# Patient Record
Sex: FEMALE | Race: WHITE | Employment: FULL TIME | URBAN - METROPOLITAN AREA
[De-identification: names, ages, dates, MRNs, and addresses within clinical notes are randomized per-mention and may not be internally consistent; named-entity substitution may affect disease eponyms.]

---

## 2017-10-04 ENCOUNTER — OFFICE VISIT (OUTPATIENT)
Dept: INTERNAL MEDICINE CLINIC | Age: 37
End: 2017-10-04

## 2017-10-04 VITALS
DIASTOLIC BLOOD PRESSURE: 65 MMHG | HEIGHT: 66 IN | SYSTOLIC BLOOD PRESSURE: 104 MMHG | OXYGEN SATURATION: 99 % | HEART RATE: 57 BPM | WEIGHT: 142.6 LBS | BODY MASS INDEX: 22.92 KG/M2 | TEMPERATURE: 98.1 F | RESPIRATION RATE: 12 BRPM

## 2017-10-04 DIAGNOSIS — H43.393 VITREOUS FLOATERS OF BOTH EYES: ICD-10-CM

## 2017-10-04 DIAGNOSIS — Z80.0 FAMILY HISTORY OF COLON CANCER: ICD-10-CM

## 2017-10-04 DIAGNOSIS — Z23 ENCOUNTER FOR IMMUNIZATION: ICD-10-CM

## 2017-10-04 DIAGNOSIS — R55 NEUROCARDIOGENIC SYNCOPE: ICD-10-CM

## 2017-10-04 DIAGNOSIS — R14.0 ABDOMINAL BLOATING: ICD-10-CM

## 2017-10-04 DIAGNOSIS — Z00.00 WELL WOMAN EXAM (NO GYNECOLOGICAL EXAM): Primary | ICD-10-CM

## 2017-10-04 DIAGNOSIS — G44.309 POST-CONCUSSION HEADACHE: ICD-10-CM

## 2017-10-04 NOTE — MR AVS SNAPSHOT
Visit Information Date & Time Provider Department Dept. Phone Encounter #  
 10/4/2017  9:00 AM Yahir Samson NP [facility] [address] [id number] Upcoming Health Maintenance Date Due  
 PAP AKA CERVICAL CYTOLOGY 5/8/2003 INFLUENZA AGE 9 TO ADULT 8/1/2017 DTaP/Tdap/Td series (2 - Td) 8/18/2020 Allergies as of 10/4/2017  Review Complete On: 10/4/2017 By: Yahir Samson NP No Known Allergies Current Immunizations  Reviewed on 10/4/2017 Name Date Influenza Vaccine (Quad) PF  Incomplete, 11/5/2015 MMR 8/18/2010 TB Skin Test (PPD) Intradermal 11/10/2015 Tdap 8/18/2010 Reviewed by Yahir Samson NP on 10/4/2017 at  9:13 AM  
You Were Diagnosed With   
  
 Codes Comments Well woman exam (no gynecological exam)    -  Primary ICD-10-CM: Z00.00 ICD-9-CM: V70.0 Post-concussion headache     ICD-10-CM: J58.524 ICD-9-CM: 339.20 Neurocardiogenic syncope     ICD-10-CM: R55 
ICD-9-CM: 780. 2 Abdominal bloating     ICD-10-CM: R14.0 ICD-9-CM: 887. 3 Family history of colon cancer     ICD-10-CM: Z80.0 ICD-9-CM: V16.0 Encounter for immunization     ICD-10-CM: A67 ICD-9-CM: V03.89 Vitals BP Pulse Temp Resp Height(growth percentile) Weight(growth percentile) 104/65 (BP 1 Location: Left arm, BP Patient Position: Sitting) (!) 57 98.1 °F (36.7 °C) (Oral) 12 5' 6\" (1.676 m) 142 lb 9.6 oz (64.7 kg) LMP SpO2 BMI OB Status Smoking Status 09/14/2017 99% 23.02 kg/m2 Having regular periods Never Smoker BMI and BSA Data Body Mass Index Body Surface Area 23.02 kg/m 2 1.74 m 2 Preferred Pharmacy [facility] [address] [phone] Your Updated Medication List  
  
   
This list is accurate as of: 10/4/17  9:39 AM.  Always use your most recent med list.  
  
  
  
  
 PRENATAL VITAMIN PO  
 Take  by mouth. We Performed the Following CBC WITH AUTOMATED DIFF [08880 CPT(R)] INFLUENZA VIRUS VAC QUAD,SPLIT,PRESV FREE SYRINGE IM [id number] CPT(R)] LIPID PANEL [62397 CPT(R)] METABOLIC PANEL, COMPREHENSIVE [88618 CPT(R)] GA IMMUNIZ ADMIN,1 SINGLE/COMB VAC/TOXOID [id number] CPT(R)] REFERRAL TO GASTROENTEROLOGY [KYH36 Custom] TSH 3RD GENERATION [38439 CPT(R)] Referral Information Referral ID Referred By Referred To  
  
 [id number] [address] Phone: [phone] Fax: [phone] Visits Status Start Date End Date 1 New Request 10/4/17 10/4/18 If your referral has a status of pending review or denied, additional information will be sent to support the outcome of this decision. Patient Instructions Well Visit, Ages 25 to 48: Care Instructions Your Care Instructions Physical exams can help you stay healthy. Your doctor has checked your overall health and may have suggested ways to take good care of yourself. He or she also may have recommended tests. At home, you can help prevent illness with healthy eating, regular exercise, and other steps. Follow-up care is a key part of your treatment and safety. Be sure to make and go to all appointments, and call your doctor if you are having problems. It's also a good idea to know your test results and keep a list of the medicines you take. How can you care for yourself at home? · Reach and stay at a healthy weight. This will lower your risk for many problems, such as obesity, diabetes, heart disease, and high blood pressure. · Get at least 30 minutes of physical activity on most days of the week. Walking is a good choice. You also may want to do other activities, such as running, swimming, cycling, or playing tennis or team sports. Discuss any changes in your exercise program with your doctor. · Do not smoke or allow others to smoke around you. If you need help quitting, talk to your doctor about stop-smoking programs and medicines. These can increase your chances of quitting for good. · Talk to your doctor about whether you have any risk factors for sexually transmitted infections (STIs). Having one sex partner (who does not have STIs and does not have sex with anyone else) is a good way to avoid these infections. · Use birth control if you do not want to have children at this time. Talk with your doctor about the choices available and what might be best for you. · Protect your skin from too much sun. When you're outdoors from 10 a.m. to 4 p.m., stay in the shade or cover up with clothing and a hat with a wide brim. Wear sunglasses that block UV rays. Even when it's cloudy, put broad-spectrum sunscreen (SPF 30 or higher) on any exposed skin. · See a dentist one or two times a year for checkups and to have your teeth cleaned. · Wear a seat belt in the car. · Drink alcohol in moderation, if at all. That means no more than 2 drinks a day for men and 1 drink a day for women. Follow your doctor's advice about when to have certain tests. These tests can spot problems early. For everyone · Cholesterol. Have the fat (cholesterol) in your blood tested after age 21. Your doctor will tell you how often to have this done based on your age, family history, or other things that can increase your risk for heart disease. · Blood pressure. Have your blood pressure checked during a routine doctor visit. Your doctor will tell you how often to check your blood pressure based on your age, your blood pressure results, and other factors. · Vision. Talk with your doctor about how often to have a glaucoma test. 
· Diabetes. Ask your doctor whether you should have tests for diabetes. · Colon cancer. Have a test for colon cancer at age 48.  You may have one of several tests. If you are younger than 48, you may need a test earlier if you have any risk factors. Risk factors include whether you already had a precancerous polyp removed from your colon or whether your parent, brother, sister, or child has had colon cancer. For women · Breast exam and mammogram. Talk to your doctor about when you should have a clinical breast exam and a mammogram. Medical experts differ on whether and how often women under 50 should have these tests. Your doctor can help you decide what is right for you. · Pap test and pelvic exam. Begin Pap tests at age 24. A Pap test is the best way to find cervical cancer. The test often is part of a pelvic exam. Ask how often to have this test. 
· Tests for sexually transmitted infections (STIs). Ask whether you should have tests for STIs. You may be at risk if you have sex with more than one person, especially if your partners do not wear condoms. For men · Tests for sexually transmitted infections (STIs). Ask whether you should have tests for STIs. You may be at risk if you have sex with more than one person, especially if you do not wear a condom. · Testicular cancer exam. Ask your doctor whether you should check your testicles regularly. · Prostate exam. Talk to your doctor about whether you should have a blood test (called a PSA test) for prostate cancer. Experts differ on whether and when men should have this test. Some experts suggest it if you are older than 39 and are -American or have a father or brother who got prostate cancer when he was younger than 72. When should you call for help? Watch closely for changes in your health, and be sure to contact your doctor if you have any problems or symptoms that concern you. Where can you learn more? Go to http://ghassan-martina.info/. Enter P072 in the search box to learn more about \"Well Visit, Ages 25 to 48: Care Instructions. \" Current as of: July 19, 2016 Content Version: 11.3 © 7401-5935 [employer]cherlink. Care instructions adapted under license by [employer] (which disclaims liability or warranty for this information). If you have questions about a medical condition or this instruction, always ask your healthcare professional. [address] any warranty or liability for your use of this information. Irritable Bowel Syndrome: Care Instructions Your Care Instructions Irritable bowel syndrome, or IBS, is a problem with the intestines that causes belly pain, bloating, gas, constipation, and diarrhea. The cause of IBS is not well known. IBS can last for many years, but it does not get worse over time or lead to serious disease. Most people can control their symptoms by changing their diet and reducing stress. Follow-up care is a key part of your treatment and safety. Be sure to make and go to all appointments, and call your doctor if you are having problems. It's also a good idea to know your test results and keep a list of the medicines you take. How can you care for yourself at home? · For constipation: 
¨ Include fruits, vegetables, beans, and whole grains in your diet each day. These foods are high in fiber. ¨ Drink plenty of fluids, enough so that your urine is light yellow or clear like water. If you have kidney, heart, or liver disease and have to limit fluids, talk with your doctor before you increase the amount of fluids you drink. ¨ Get some exercise every day. Build up slowly to 30 to 60 minutes a day on 5 or more days of the week. ¨ Take a fiber supplement, such as Citrucel or Metamucil, every day if needed. Read and follow all instructions on the label. ¨ Schedule time each day for a bowel movement. Having a daily routine may help. Take your time and do not strain when having a bowel movement. · If you often have diarrhea, limit foods and drinks that make it worse. These are different for each person but may include caffeine (found in coffee, tea, chocolate, and cola drinks), alcohol, fatty foods, gas-producing foods (such as beans, cabbage, and broccoli), some dairy products, and spicy foods. Do not eat candy or gum that contains sorbitol. · Keep a daily diary of what you eat and what symptoms you have. This may help find foods that cause you problems. · Eat slowly. Try to make mealtime relaxing. · Find ways to reduce stress. · Get at least 30 minutes of exercise on most days of the week. Exercise can help reduce tension and prevent constipation. Walking is a good choice. You also may want to do other activities, such as running, swimming, cycling, or playing tennis or team sports. When should you call for help? Call your doctor now or seek immediate medical care if: 
· Your pain is different than usual or occurs with fever. · You lose weight without trying, or you lose your appetite and you do not know why. · Your symptoms often wake you from sleep. · Your stools are black and tarlike or have streaks of blood. Watch closely for changes in your health, and be sure to contact your doctor if: 
· Your IBS symptoms get worse or begin to disrupt your day-to-day life. · You become more tired than usual. 
· Your home treatment stops working. Where can you learn more? Go to http://ghassan-martina.info/. Enter S340 in the search box to learn more about \"Irritable Bowel Syndrome: Care Instructions. \" Current as of: August 9, 2016 Content Version: 11.3 © 2531-1457 Bannerman. Care instructions adapted under license by [employer] (which disclaims liability or warranty for this information). If you have questions about a medical condition or this instruction, always ask your healthcare professional. [place] [address] any warranty or liability for your use of this information. Influenza (Flu) Vaccine: Care Instructions Your Care Instructions Influenza (flu) is an infection in the lungs and breathing passages. It is caused by the influenza virus. There are different strains, or types, of the flu virus every year. The flu comes on quickly. It can cause a cough, stuffy nose, fever, chills, tiredness, and aches and pains. These symptoms may last up to 10 days. The flu can make you feel very sick, but most of the time it doesn't lead to other problems. But it can cause serious problems in people who are older or who have a long-term illness, such as heart disease or diabetes. You can help prevent the flu by getting a flu vaccine every year, as soon as it is available. You cannot get the flu from the vaccine. The vaccine prevents most cases of the flu. But even when the vaccine doesn't prevent the flu, it can make symptoms less severe and reduce the chance of problems from the flu. Sometimes, young children and people who have an immune system problem may have a slight fever or muscle aches or pains 6 to 12 hours after getting the shot. These symptoms usually last 1 or 2 days. Follow-up care is a key part of your treatment and safety. Be sure to make and go to all appointments, and call your doctor if you are having problems. It's also a good idea to know your test results and keep a list of the medicines you take. Who should get the flu vaccine? Everyone age 7 months or older should get a flu vaccine each year. It lowers the chance of getting and spreading the flu. The vaccine is very important for people who are at high risk for getting other health problems from the flu. This includes: · Anyone 48years of age or older. · People who live in a long-term care center, such as a nursing home. · All children 6 months through 25years of age. · Adults and children 6 months and older who have long-term heart or lung problems, such as asthma. · Adults and children 6 months and older who needed medical care or were in a hospital during the past year because of diabetes, chronic kidney disease, or a weak immune system (including HIV or AIDS). · Women who will be pregnant during the flu season. · People who have any condition that can make it hard to breathe or swallow (such as a brain injury or muscle disorders). · People who can give the flu to others who are at high risk for problems from the flu. This includes all health care workers and close contacts of people age 72 or older. Who should not get the flu vaccine? The person who gives the vaccine may tell you not to get it if you: 
· Have a severe allergy to eggs or any part of the vaccine. · Have had a severe reaction to a flu vaccine in the past. 
· Have had Guillain-Barré syndrome (GBS). · Are sick with a fever. (Get the vaccine when symptoms are gone.) How can you care for yourself at home? · If you or your child has a sore arm or a slight fever after the shot, take an over-the-counter pain medicine, such as acetaminophen (Tylenol) or ibuprofen (Advil, Motrin). Read and follow all instructions on the label. Do not give aspirin to anyone younger than 20. It has been linked to Reye syndrome, a serious illness. · Do not take two or more pain medicines at the same time unless the doctor told you to. Many pain medicines have acetaminophen, which is Tylenol. Too much acetaminophen (Tylenol) can be harmful. When should you call for help? Call 911 anytime you think you may need emergency care. For example, call if after getting the flu vaccine: 
· You have symptoms of a severe reaction to the flu vaccine. Symptoms of a severe reaction may include: ¨ Severe difficulty breathing. ¨ Sudden raised, red areas (hives) all over your body. ¨ Severe lightheadedness. Call your doctor now or seek immediate medical care if after getting the flu vaccine: · You think you are having a reaction to the flu vaccine, such as a new fever. Watch closely for changes in your health, and be sure to contact your doctor if you have any problems. Where can you learn more? Go to http://ghassan-martina.info/. Enter P010 in the search box to learn more about \"Influenza (Flu) Vaccine: Care Instructions. \" Current as of: August 1, 2016 Content Version: 11.3 © 1757-9711 Libboo. Care instructions adapted under license by [employer] (which disclaims liability or warranty for this information). If you have questions about a medical condition or this instruction, always ask your healthcare professional. [place] [address] any warranty or liability for your use of this information. Introducing [facility]! Dear Ant Ac: Thank you for requesting a [employer] account. Our records indicate that you already have an active [employer] account. You can access your account anytime at [email or web address] Did you know that you can access your hospital and ER discharge instructions at any time in [employer]? You can also review all of your test results from your hospital stay or ER visit. Additional Information If you have questions, please visit the Frequently Asked Questions section of the [employer] website at [email or web address] Remember, [employer] is NOT to be used for urgent needs. For medical emergencies, dial 911. Now available from your iPhone and Android! Please provide this summary of care documentation to your next provider. Your primary care clinician is listed as [address]. If you have any questions after today's visit, please call [phone].

## 2017-10-04 NOTE — PROGRESS NOTES
Subjective:   30 y.o. female for Well Woman Check. Her gyne and breast care is done elsewhere by her Ob-Gyne physician. Had recent normal pap smear and mammogram with Dr Milly Aguilar in 2017. She has concerns about need for screening colonoscopy; both of her parents have had precancerous polyps and her maternal and paternal grandmothers had colon cancer. Her older sister started screening at age 28. She has noted some abdominal bloating, flatus with certain foods especially after eating pizza. Has noted persistent soft stools for years but denies blood in stools. History of Neurocardiogenic syncope that was diagnosed at age 15; tends to develop symptoms if she becomes dehydrated or has not eaten on a regular basis. Has been stable. Reports occasional left sided headaches since she was struck by baseball in 8/2016. Had negative Head CT scan but had concussion after injury. Has noted headache will develop if she is in a loud environment and she has been using earplugs when she feels noise level becomes overwhelming. Has noted some increased visual floaters since her concussion but has not had vision check recently. Current Outpatient Prescriptions   Medication Sig Dispense Refill    PRENATAL VIT CALC,IRON,FOLIC (PRENATAL VITAMIN PO) Take  by mouth.        No Known Allergies  Past Medical History:   Diagnosis Date    Neurocardiogenic syncope      Past Surgical History:   Procedure Laterality Date    HX ABDOMINAL LAPAROSCOPY      HX HEENT      wisdom teeth extraction    HX LEEP PROCEDURE      HX OTHER SURGICAL  2009    D&E     Family History   Problem Relation Age of Onset    Hypertension Mother    Lee Yan Elevated Lipids Mother     Colon Polyps Mother     Hypertension Father    Lee Yan Elevated Lipids Father     Colon Polyps Father     No Known Problems Sister     Bleeding Prob Brother      ITP    Cancer Maternal Grandmother      colon cancer    Cancer Paternal Grandmother      colon cancer    Heart Disease Paternal Grandfather     Hypertension Paternal Grandfather     No Known Problems Sister     No Known Problems Brother     No Known Problems Daughter      Social History   Substance Use Topics    Smoking status: Never Smoker    Smokeless tobacco: Never Used    Alcohol use 1.2 oz/week     2 Glasses of wine per week      Comment: weekly             ROS: Feeling generally well. No TIA's or unusual headaches, no dysphagia. No prolonged cough. No dyspnea or chest pain on exertion. No abdominal pain, change in bowel habits, black or bloody stools. No urinary tract symptoms. No new or unusual musculoskeletal symptoms. Specific concerns today: screening colonoscopy. Objective: The patient appears well, alert, oriented x 3, in no distress. Visit Vitals    /65 (BP 1 Location: Left arm, BP Patient Position: Sitting)    Pulse (!) 57    Temp 98.1 °F (36.7 °C) (Oral)    Resp 12    Ht 5' 6\" (1.676 m)    Wt 142 lb 9.6 oz (64.7 kg)    LMP 09/14/2017    SpO2 99%    BMI 23.02 kg/m2     ENT normal.  Neck supple. No adenopathy or thyromegaly. EDER. Lungs are clear, good air entry, no wheezes, rhonchi or rales. S1 and S2 normal, no murmurs, regular rate and rhythm. Abdomen soft without tenderness, guarding, mass or organomegaly. Extremities show no edema, normal peripheral pulses. Neurological is normal, no focal findings. Breast and Pelvic exams are deferred. Assessment/Plan:   Well Woman  routine labs ordered  Diagnoses and all orders for this visit:    1. Well woman exam (no gynecological exam)  -     CBC WITH AUTOMATED DIFF  -     LIPID PANEL  -     TSH 3RD GENERATION  -     METABOLIC PANEL, COMPREHENSIVE    2. Abdominal bloating -- suspect she may have some food sensitivity versus IBS; also need for screening colonoscopy due to family history of colon cancer and precancerous colon polyps  -     REFERRAL TO GASTROENTEROLOGY    3. Neurocardiogenic syncope --stable    4.  Post-concussion headache -- neurology consult if headaches worsen in severity    5. Vitreous floaters of both eyes  -     REFERRAL TO OPTOMETRY    6. Family history of colon cancer  -     REFERRAL TO GASTROENTEROLOGY    7. Encounter for immunization  -     Influenza virus vaccine (QUADRIVALENT PRES FREE SYRINGE) IM (83036)  -     Mily De La Torre ADMIN,1 SINGLE/COMB VAC/TOXOID    Rulon Snowball was counseled on age-appropriate/ guideline-based risk prevention behaviors and screening for a 30y.o. year old   female . We also discussed adjustments in screening based on family history if necessary. Printed instructions for preventative screening guidelines and healthy behaviors given to patient with after visit summary.         lab results and schedule of future lab studies reviewed with patient  reviewed diet, exercise and weight control  cardiovascular risk and specific lipid/LDL goals reviewed  reviewed medications and side effects in detail

## 2017-10-04 NOTE — PATIENT INSTRUCTIONS
Well Visit, Ages 25 to 48: Care Instructions  Your Care Instructions  Physical exams can help you stay healthy. Your doctor has checked your overall health and may have suggested ways to take good care of yourself. He or she also may have recommended tests. At home, you can help prevent illness with healthy eating, regular exercise, and other steps. Follow-up care is a key part of your treatment and safety. Be sure to make and go to all appointments, and call your doctor if you are having problems. It's also a good idea to know your test results and keep a list of the medicines you take. How can you care for yourself at home? · Reach and stay at a healthy weight. This will lower your risk for many problems, such as obesity, diabetes, heart disease, and high blood pressure. · Get at least 30 minutes of physical activity on most days of the week. Walking is a good choice. You also may want to do other activities, such as running, swimming, cycling, or playing tennis or team sports. Discuss any changes in your exercise program with your doctor. · Do not smoke or allow others to smoke around you. If you need help quitting, talk to your doctor about stop-smoking programs and medicines. These can increase your chances of quitting for good. · Talk to your doctor about whether you have any risk factors for sexually transmitted infections (STIs). Having one sex partner (who does not have STIs and does not have sex with anyone else) is a good way to avoid these infections. · Use birth control if you do not want to have children at this time. Talk with your doctor about the choices available and what might be best for you. · Protect your skin from too much sun. When you're outdoors from 10 a.m. to 4 p.m., stay in the shade or cover up with clothing and a hat with a wide brim. Wear sunglasses that block UV rays. Even when it's cloudy, put broad-spectrum sunscreen (SPF 30 or higher) on any exposed skin.   · See a dentist one or two times a year for checkups and to have your teeth cleaned. · Wear a seat belt in the car. · Drink alcohol in moderation, if at all. That means no more than 2 drinks a day for men and 1 drink a day for women. Follow your doctor's advice about when to have certain tests. These tests can spot problems early. For everyone  · Cholesterol. Have the fat (cholesterol) in your blood tested after age 21. Your doctor will tell you how often to have this done based on your age, family history, or other things that can increase your risk for heart disease. · Blood pressure. Have your blood pressure checked during a routine doctor visit. Your doctor will tell you how often to check your blood pressure based on your age, your blood pressure results, and other factors. · Vision. Talk with your doctor about how often to have a glaucoma test.  · Diabetes. Ask your doctor whether you should have tests for diabetes. · Colon cancer. Have a test for colon cancer at age 48. You may have one of several tests. If you are younger than 48, you may need a test earlier if you have any risk factors. Risk factors include whether you already had a precancerous polyp removed from your colon or whether your parent, brother, sister, or child has had colon cancer. For women  · Breast exam and mammogram. Talk to your doctor about when you should have a clinical breast exam and a mammogram. Medical experts differ on whether and how often women under 50 should have these tests. Your doctor can help you decide what is right for you. · Pap test and pelvic exam. Begin Pap tests at age 24. A Pap test is the best way to find cervical cancer. The test often is part of a pelvic exam. Ask how often to have this test.  · Tests for sexually transmitted infections (STIs). Ask whether you should have tests for STIs. You may be at risk if you have sex with more than one person, especially if your partners do not wear condoms.   For men  · Tests for sexually transmitted infections (STIs). Ask whether you should have tests for STIs. You may be at risk if you have sex with more than one person, especially if you do not wear a condom. · Testicular cancer exam. Ask your doctor whether you should check your testicles regularly. · Prostate exam. Talk to your doctor about whether you should have a blood test (called a PSA test) for prostate cancer. Experts differ on whether and when men should have this test. Some experts suggest it if you are older than 39 and are -American or have a father or brother who got prostate cancer when he was younger than 72. When should you call for help? Watch closely for changes in your health, and be sure to contact your doctor if you have any problems or symptoms that concern you. Where can you learn more? Go to http://ghassan-martina.info/. Enter P072 in the search box to learn more about \"Well Visit, Ages 25 to 48: Care Instructions. \"  Current as of: July 19, 2016  Content Version: 11.3  © 2397-7229 [employer]. Care instructions adapted under license by [employer] (which disclaims liability or warranty for this information). If you have questions about a medical condition or this instruction, always ask your healthcare professional. [place] [address] any warranty or liability for your use of this information. Irritable Bowel Syndrome: Care Instructions  Your Care Instructions  Irritable bowel syndrome, or IBS, is a problem with the intestines that causes belly pain, bloating, gas, constipation, and diarrhea. The cause of IBS is not well known. IBS can last for many years, but it does not get worse over time or lead to serious disease. Most people can control their symptoms by changing their diet and reducing stress. Follow-up care is a key part of your treatment and safety.  Be sure to make and go to all appointments, and call your doctor if you are having problems. It's also a good idea to know your test results and keep a list of the medicines you take. How can you care for yourself at home? · For constipation:  ¨ Include fruits, vegetables, beans, and whole grains in your diet each day. These foods are high in fiber. ¨ Drink plenty of fluids, enough so that your urine is light yellow or clear like water. If you have kidney, heart, or liver disease and have to limit fluids, talk with your doctor before you increase the amount of fluids you drink. ¨ Get some exercise every day. Build up slowly to 30 to 60 minutes a day on 5 or more days of the week. ¨ Take a fiber supplement, such as Citrucel or Metamucil, every day if needed. Read and follow all instructions on the label. ¨ Schedule time each day for a bowel movement. Having a daily routine may help. Take your time and do not strain when having a bowel movement. · If you often have diarrhea, limit foods and drinks that make it worse. These are different for each person but may include caffeine (found in coffee, tea, chocolate, and cola drinks), alcohol, fatty foods, gas-producing foods (such as beans, cabbage, and broccoli), some dairy products, and spicy foods. Do not eat candy or gum that contains sorbitol. · Keep a daily diary of what you eat and what symptoms you have. This may help find foods that cause you problems. · Eat slowly. Try to make mealtime relaxing. · Find ways to reduce stress. · Get at least 30 minutes of exercise on most days of the week. Exercise can help reduce tension and prevent constipation. Walking is a good choice. You also may want to do other activities, such as running, swimming, cycling, or playing tennis or team sports. When should you call for help? Call your doctor now or seek immediate medical care if:  · Your pain is different than usual or occurs with fever.   · You lose weight without trying, or you lose your appetite and you do not know why.  · Your symptoms often wake you from sleep. · Your stools are black and tarlike or have streaks of blood. Watch closely for changes in your health, and be sure to contact your doctor if:  · Your IBS symptoms get worse or begin to disrupt your day-to-day life. · You become more tired than usual.  · Your home treatment stops working. Where can you learn more? Go to http://ghassan-martina.info/. Enter A858 in the search box to learn more about \"Irritable Bowel Syndrome: Care Instructions. \"  Current as of: August 9, 2016  Content Version: 11.3  © 2410-3199 [employer]. Care instructions adapted under license by [employer] (which disclaims liability or warranty for this information). If you have questions about a medical condition or this instruction, always ask your healthcare professional. [place] [address] any warranty or liability for your use of this information. Influenza (Flu) Vaccine: Care Instructions  Your Care Instructions  Influenza (flu) is an infection in the lungs and breathing passages. It is caused by the influenza virus. There are different strains, or types, of the flu virus every year. The flu comes on quickly. It can cause a cough, stuffy nose, fever, chills, tiredness, and aches and pains. These symptoms may last up to 10 days. The flu can make you feel very sick, but most of the time it doesn't lead to other problems. But it can cause serious problems in people who are older or who have a long-term illness, such as heart disease or diabetes. You can help prevent the flu by getting a flu vaccine every year, as soon as it is available. You cannot get the flu from the vaccine. The vaccine prevents most cases of the flu. But even when the vaccine doesn't prevent the flu, it can make symptoms less severe and reduce the chance of problems from the flu.   Sometimes, young children and people who have an immune system problem may have a slight fever or muscle aches or pains 6 to 12 hours after getting the shot. These symptoms usually last 1 or 2 days. Follow-up care is a key part of your treatment and safety. Be sure to make and go to all appointments, and call your doctor if you are having problems. It's also a good idea to know your test results and keep a list of the medicines you take. Who should get the flu vaccine? Everyone age 7 months or older should get a flu vaccine each year. It lowers the chance of getting and spreading the flu. The vaccine is very important for people who are at high risk for getting other health problems from the flu. This includes:  · Anyone 48years of age or older. · People who live in a long-term care center, such as a nursing home. · All children 6 months through 25years of age. · Adults and children 6 months and older who have long-term heart or lung problems, such as asthma. · Adults and children 6 months and older who needed medical care or were in a hospital during the past year because of diabetes, chronic kidney disease, or a weak immune system (including HIV or AIDS). · Women who will be pregnant during the flu season. · People who have any condition that can make it hard to breathe or swallow (such as a brain injury or muscle disorders). · People who can give the flu to others who are at high risk for problems from the flu. This includes all health care workers and close contacts of people age 72 or older. Who should not get the flu vaccine? The person who gives the vaccine may tell you not to get it if you:  · Have a severe allergy to eggs or any part of the vaccine. · Have had a severe reaction to a flu vaccine in the past.  · Have had Guillain-Barré syndrome (GBS). · Are sick with a fever. (Get the vaccine when symptoms are gone.)  How can you care for yourself at home?   · If you or your child has a sore arm or a slight fever after the shot, take an over-the-counter pain medicine, such as acetaminophen (Tylenol) or ibuprofen (Advil, Motrin). Read and follow all instructions on the label. Do not give aspirin to anyone younger than 20. It has been linked to Reye syndrome, a serious illness. · Do not take two or more pain medicines at the same time unless the doctor told you to. Many pain medicines have acetaminophen, which is Tylenol. Too much acetaminophen (Tylenol) can be harmful. When should you call for help? Call 911 anytime you think you may need emergency care. For example, call if after getting the flu vaccine:  · You have symptoms of a severe reaction to the flu vaccine. Symptoms of a severe reaction may include:  ¨ Severe difficulty breathing. ¨ Sudden raised, red areas (hives) all over your body. ¨ Severe lightheadedness. Call your doctor now or seek immediate medical care if after getting the flu vaccine:  · You think you are having a reaction to the flu vaccine, such as a new fever. Watch closely for changes in your health, and be sure to contact your doctor if you have any problems. Where can you learn more? Go to http://ghassan-martina.info/. Enter Y804 in the search box to learn more about \"Influenza (Flu) Vaccine: Care Instructions. \"  Current as of: August 1, 2016  Content Version: 11.3  © 9439-5608 [employer]. Care instructions adapted under license by [employer] (which disclaims liability or warranty for this information). If you have questions about a medical condition or this instruction, always ask your healthcare professional. [place] [address] any warranty or liability for your use of this information.

## 2017-10-05 LAB
ALBUMIN SERPL-MCNC: 4.5 G/DL (ref 3.5–5.5)
ALBUMIN/GLOB SERPL: 2 {RATIO} (ref 1.2–2.2)
ALP SERPL-CCNC: 57 IU/L (ref 39–117)
ALT SERPL-CCNC: 11 IU/L (ref 0–32)
AST SERPL-CCNC: 15 IU/L (ref 0–40)
BASOPHILS # BLD AUTO: 0 X10E3/UL (ref 0–0.2)
BASOPHILS NFR BLD AUTO: 1 %
BILIRUB SERPL-MCNC: 0.5 MG/DL (ref 0–1.2)
BUN SERPL-MCNC: 16 MG/DL (ref 6–20)
BUN/CREAT SERPL: 20 (ref 9–23)
CALCIUM SERPL-MCNC: 9.5 MG/DL (ref 8.7–10.2)
CHLORIDE SERPL-SCNC: 98 MMOL/L (ref 96–106)
CHOLEST SERPL-MCNC: 162 MG/DL (ref 100–199)
CO2 SERPL-SCNC: 26 MMOL/L (ref 18–29)
CREAT SERPL-MCNC: 0.82 MG/DL (ref 0.57–1)
EOSINOPHIL # BLD AUTO: 0.4 X10E3/UL (ref 0–0.4)
EOSINOPHIL NFR BLD AUTO: 6 %
ERYTHROCYTE [DISTWIDTH] IN BLOOD BY AUTOMATED COUNT: 13.3 % (ref 12.3–15.4)
GLOBULIN SER CALC-MCNC: 2.3 G/DL (ref 1.5–4.5)
GLUCOSE SERPL-MCNC: 80 MG/DL (ref 65–99)
HCT VFR BLD AUTO: 38.4 % (ref 34–46.6)
HDLC SERPL-MCNC: 71 MG/DL
HGB BLD-MCNC: 12.7 G/DL (ref 11.1–15.9)
IMM GRANULOCYTES # BLD: 0 X10E3/UL (ref 0–0.1)
IMM GRANULOCYTES NFR BLD: 0 %
INTERPRETATION, 910389: NORMAL
LDLC SERPL CALC-MCNC: 81 MG/DL (ref 0–99)
LYMPHOCYTES # BLD AUTO: 2.5 X10E3/UL (ref 0.7–3.1)
LYMPHOCYTES NFR BLD AUTO: 39 %
MCH RBC QN AUTO: 29.5 PG (ref 26.6–33)
MCHC RBC AUTO-ENTMCNC: 33.1 G/DL (ref 31.5–35.7)
MCV RBC AUTO: 89 FL (ref 79–97)
MONOCYTES # BLD AUTO: 0.5 X10E3/UL (ref 0.1–0.9)
MONOCYTES NFR BLD AUTO: 7 %
NEUTROPHILS # BLD AUTO: 3 X10E3/UL (ref 1.4–7)
NEUTROPHILS NFR BLD AUTO: 47 %
PLATELET # BLD AUTO: 308 X10E3/UL (ref 150–379)
POTASSIUM SERPL-SCNC: 4.7 MMOL/L (ref 3.5–5.2)
PROT SERPL-MCNC: 6.8 G/DL (ref 6–8.5)
RBC # BLD AUTO: 4.3 X10E6/UL (ref 3.77–5.28)
SODIUM SERPL-SCNC: 138 MMOL/L (ref 134–144)
TRIGL SERPL-MCNC: 48 MG/DL (ref 0–149)
TSH SERPL DL<=0.005 MIU/L-ACNC: 2.84 UIU/ML (ref 0.45–4.5)
VLDLC SERPL CALC-MCNC: 10 MG/DL (ref 5–40)
WBC # BLD AUTO: 6.4 X10E3/UL (ref 3.4–10.8)

## 2018-01-19 ENCOUNTER — HOSPITAL ENCOUNTER (OUTPATIENT)
Age: 38
Setting detail: OUTPATIENT SURGERY
Discharge: HOME | End: 2018-01-19
Attending: INTERNAL MEDICINE | Admitting: INTERNAL MEDICINE
Payer: COMMERCIAL

## 2018-01-19 ENCOUNTER — ANESTHESIA EVENT (OUTPATIENT)
Dept: ENDOSCOPY | Age: 38
End: 2018-01-19
Payer: COMMERCIAL

## 2018-01-19 ENCOUNTER — ANESTHESIA (OUTPATIENT)
Dept: ENDOSCOPY | Age: 38
End: 2018-01-19
Payer: COMMERCIAL

## 2018-01-19 VITALS
HEIGHT: 66 IN | OXYGEN SATURATION: 100 % | HEART RATE: 46 BPM | BODY MASS INDEX: 22.82 KG/M2 | WEIGHT: 142 LBS | TEMPERATURE: 97.6 F | SYSTOLIC BLOOD PRESSURE: 107 MMHG | RESPIRATION RATE: 10 BRPM | DIASTOLIC BLOOD PRESSURE: 66 MMHG

## 2018-01-19 LAB — HCG UR QL: NEGATIVE

## 2018-01-19 PROCEDURE — 74011250636 HC RX REV CODE- 250/636: Performed by: INTERNAL MEDICINE

## 2018-01-19 PROCEDURE — 81025 URINE PREGNANCY TEST: CPT

## 2018-01-19 PROCEDURE — 74011000250 HC RX REV CODE- 250

## 2018-01-19 PROCEDURE — 76060000031 HC ANESTHESIA FIRST 0.5 HR: Performed by: INTERNAL MEDICINE

## 2018-01-19 PROCEDURE — 74011250636 HC RX REV CODE- 250/636

## 2018-01-19 PROCEDURE — 74011250637 HC RX REV CODE- 250/637: Performed by: INTERNAL MEDICINE

## 2018-01-19 PROCEDURE — 76040000019: Performed by: INTERNAL MEDICINE

## 2018-01-19 RX ORDER — NALOXONE HYDROCHLORIDE 0.4 MG/ML
0.4 INJECTION, SOLUTION INTRAMUSCULAR; INTRAVENOUS; SUBCUTANEOUS
Status: DISCONTINUED | OUTPATIENT
Start: 2018-01-19 | End: 2018-01-19 | Stop reason: HOSPADM

## 2018-01-19 RX ORDER — MIDAZOLAM HYDROCHLORIDE 1 MG/ML
.25-5 INJECTION, SOLUTION INTRAMUSCULAR; INTRAVENOUS
Status: DISCONTINUED | OUTPATIENT
Start: 2018-01-19 | End: 2018-01-19 | Stop reason: HOSPADM

## 2018-01-19 RX ORDER — DEXTROMETHORPHAN/PSEUDOEPHED 2.5-7.5/.8
1.2 DROPS ORAL
Status: DISCONTINUED | OUTPATIENT
Start: 2018-01-19 | End: 2018-01-19 | Stop reason: HOSPADM

## 2018-01-19 RX ORDER — LIDOCAINE HYDROCHLORIDE 20 MG/ML
INJECTION, SOLUTION EPIDURAL; INFILTRATION; INTRACAUDAL; PERINEURAL AS NEEDED
Status: DISCONTINUED | OUTPATIENT
Start: 2018-01-19 | End: 2018-01-19 | Stop reason: HOSPADM

## 2018-01-19 RX ORDER — PROPOFOL 10 MG/ML
INJECTION, EMULSION INTRAVENOUS AS NEEDED
Status: DISCONTINUED | OUTPATIENT
Start: 2018-01-19 | End: 2018-01-19 | Stop reason: HOSPADM

## 2018-01-19 RX ORDER — SODIUM CHLORIDE 9 MG/ML
50 INJECTION, SOLUTION INTRAVENOUS CONTINUOUS
Status: DISCONTINUED | OUTPATIENT
Start: 2018-01-19 | End: 2018-01-19 | Stop reason: HOSPADM

## 2018-01-19 RX ORDER — EPINEPHRINE 0.1 MG/ML
1 INJECTION INTRACARDIAC; INTRAVENOUS
Status: DISCONTINUED | OUTPATIENT
Start: 2018-01-19 | End: 2018-01-19 | Stop reason: HOSPADM

## 2018-01-19 RX ORDER — FLUMAZENIL 0.1 MG/ML
0.2 INJECTION INTRAVENOUS
Status: DISCONTINUED | OUTPATIENT
Start: 2018-01-19 | End: 2018-01-19 | Stop reason: HOSPADM

## 2018-01-19 RX ORDER — PROPOFOL 10 MG/ML
INJECTION, EMULSION INTRAVENOUS
Status: DISCONTINUED | OUTPATIENT
Start: 2018-01-19 | End: 2018-01-19 | Stop reason: HOSPADM

## 2018-01-19 RX ORDER — ATROPINE SULFATE 0.1 MG/ML
0.4 INJECTION INTRAVENOUS
Status: DISCONTINUED | OUTPATIENT
Start: 2018-01-19 | End: 2018-01-19 | Stop reason: HOSPADM

## 2018-01-19 RX ADMIN — LIDOCAINE HYDROCHLORIDE 20 MG: 20 INJECTION, SOLUTION EPIDURAL; INFILTRATION; INTRACAUDAL; PERINEURAL at 11:09

## 2018-01-19 RX ADMIN — PROPOFOL 100 MG: 10 INJECTION, EMULSION INTRAVENOUS at 11:13

## 2018-01-19 RX ADMIN — PROPOFOL 100 MG: 10 INJECTION, EMULSION INTRAVENOUS at 11:09

## 2018-01-19 RX ADMIN — SODIUM CHLORIDE 50 ML/HR: 900 INJECTION, SOLUTION INTRAVENOUS at 10:57

## 2018-01-19 RX ADMIN — SODIUM CHLORIDE 50 ML/HR: 900 INJECTION, SOLUTION INTRAVENOUS at 10:15

## 2018-01-19 RX ADMIN — PROPOFOL 125 MCG/KG/MIN: 10 INJECTION, EMULSION INTRAVENOUS at 11:09

## 2018-01-19 RX ADMIN — SIMETHICONE 80 MG: 20 SUSPENSION/ DROPS ORAL at 11:18

## 2018-01-19 NOTE — H&P
The patient is a 30year old female who presents with a complaint of Bloating. The patient presents consultation at the request of Dr. Darcy Carpenter). The onset of the bloating has been variable and has been occurring in an intermittent pattern for 2 years. The course has been recurrent. The symptoms have been associated with diarrhea (after eating pizza). Note for \"Bloating\": Notes that both parents have had colon polyps that were precancerous as well as maternal grandmother. Yaneth Mantilla grandmother had colon cancer in her mid 62s and . Problem List/Past Medical (; 10/25/2017 10:11 AM)  Neurocardiogenic syncope (780.2  R55)    History of concussion (N74.62  Q13.734)      Past Surgical History (; 10/25/2017 10:11 AM)  LEEP (19475)    D&C (DILATATION AND CURETTAGE, SCRAPING OF UTERUS) (49837)    Laparoscopy    COLPOSCOPY, CERVIX (04168)      Allergies (Daline Roelofse; 10/25/2017 10:11 AM)  No Known Allergies  [10/09/2017]:  No Known Drug Allergies  [10/09/2017]: Medication History (Daline Roelofse; 10/25/2017 10:12 AM)  Tenisha Georgis (2.5MG Tablet, 2 Oral daily for 5 days) Active. Prenatal Vitamins  (1 Oral daily) Active. Medications Reconciled     Family History CHRIS Turcios; 10/25/2017 10:25 AM)  Colon Polyps   Mother, Father. maternal grandmother  late 76s  Colon Cancer   Negative Family History Of, First Degree Relatives. Paternal grandmother-  in her 62s  Hypertension   Mother, Father. Hypercholesterolemia   Mother, Father. Social History (; 10/25/2017 10:11 AM)  Tobacco Use   Never smoker. Alcohol Use   Occasional alcohol use. Marital status   . Employment status   Part-time. Blood Transfusion   No.    Health Maintenance History (; 10/25/2017 10:11 AM)  Flu Vaccine   Date: 10/2017.   Pneumovax   none        Review of Systems (; 10/25/2017 10:12 AM)  General Not Present- Chronic Fatigue, Poor Appetite, Weight Gain and Weight Loss. Skin Not Present- Itching, Rash and Skin Color Changes. HEENT Not Present- Hearing Loss and Vertigo. Respiratory Not Present- Difficulty Breathing and TB exposure. Cardiovascular Not Present- Chest Pain, Use of Antibiotics before Dental Procedures and Use of Blood Thinners. Gastrointestinal Present- See HPI. Musculoskeletal Not Present- Arthritis, Hip Replacement Surgery and Knee Replacement Surgery. Neurological Present- Headaches. Not Present- Weakness. Psychiatric Not Present- Depression. Endocrine Not Present- Diabetes and Thyroid Problems. Hematology Not Present- Anemia. Vitals (Yasir Burger; 10/25/2017 10:11 AM)  10/25/2017 10:07 AM  Weight: 147 lb   Height: 66 in   Body Surface Area: 1.75 m²   Body Mass Index: 23.73 kg/m²    Temp.: 98.1° F (Temporal)    Pulse: 46 (Regular)    Resp. : 16 (Unlabored)     BP: 130/72 (Sitting, Left Arm, Standard)              Physical Exam [facility]; 10/25/2017 10:32 AM)  General  Posture - Normal posture. Integumentary  Global Assessment  Examination of related systems reveals - Well-developed, well-nourished and in no acute distress; alert and oriented x 3. Eye  Pupil - Bilateral - Normal, Equal and Round. ENMT  Global Assessment  Examination of related systems reveals - normal voice with no communication aids required. Peripheral Vascular  Upper Extremity  Inspection - Bilateral - Acrocyanotic. Neurologic  Neurologic evaluation reveals  - normal attention span and ability to concentrate. Neuropsychiatric  Mental status exam performed with findings of - thought content normal with ability to perform basic computations and apply abstract reasoning, associations are intact and demonstrates appropriate judgment and insight. The patient's mood and affect are described as  - full, not anxious, not agitated.         Assessment & Plan [facility]; 10/25/2017 11:34 AM)  Blood in stool (578.1  K92.1)  Impression: Reports that she sees this with wiping on occasion. Notes that she thinks it is a hemorrhoid and this has occurred for many years. Likely hemorrhoidal or irritation. Says her stools are soft and she has urgency after eating pizza. Currently undergoing fertility treatments, not sure about having colonoscopy at this time. Family history of colonic polyps (V18.51  Z83.71)  Impression: both parents. Sister had colonoscopy starting at age 28, not sure why or if she has had colon polyp or not. I have asked her to find out and let me know. Will determine if she needs colonoscopy based on this. Family hx of colon cancer (V16.0  Z80.0)  Current Plans  Discussed the risks and benefits of colonoscopy with the patient. Colorectal Cancer screening not performed at this time. Plan of care was completed in collaboration with Dr. Thor Escobar.

## 2018-01-19 NOTE — PROCEDURES
Maddison Dunbar M.D.  [phone]            2018          Colonoscopy Operative Report  [facility]  :  1980  [place] Medical Record Number:  [id number]      Indications:    Family history of coloretal cancer (screening only), Family history of coloretal adenoma  (screening only), Lower rectal bleeding     :  David Castañeda MD    Referring Provider: Dontrell Ryder MD    Sedation:  MAC anesthesia    Pre-Procedural Exam:      Airway: clear,  No airway problems anticipated  Heart: RRR, without gallops or rubs  Lungs: clear bilaterally without wheezes, crackles, or rhonchi  Abdomen: soft, nontender, nondistended, bowel sounds present  Mental Status: awake, alert and oriented to person, place and time     Procedure Details:  After informed consent was obtained with all risks and benefits of procedure explained and preoperative exam completed, the patient was taken to the endoscopy suite and placed in the left lateral decubitus position. Upon sequential sedation as per above, a digital rectal exam was performed. The Olympus videocolonoscope  was inserted in the rectum and carefully advanced to the cecum, which was identified by the ileocecal valve and appendiceal orifice. The quality of preparation was good. The colonoscope was slowly withdrawn with careful inspection and evaluation between folds. Retroflexion in the rectum was performed. Findings:   Terminal Ileum: not intubated  Cecum: normal  Ascending Colon: normal  Transverse Colon: normal  Descending Colon: normal  Sigmoid: normal  Rectum: no mucosal lesion appreciated  Grade 1 internal hemorrhoid(s); Interventions:  none    Specimen Removed:  none    Complications: None. EBL:  None. Impression:  Small internal hemorrhoids, otherwise mucosa within normal throughout the colon.     Recommendations:  -Repeat colonoscopy in 5 years.   -High fiber diet and daily fiber supplements.  -Resume normal medication(s). -Consider office based hemorrhoid rubber band ligation if bleeding is recurrent    Discharge Disposition:  Home in the company of a  when able to ambulate.     Alexandra Camejo MD  1/19/2018  11:24 AM

## 2018-01-19 NOTE — ANESTHESIA PREPROCEDURE EVALUATION
Anesthetic History   No history of anesthetic complications            Review of Systems / Medical History  Patient summary reviewed    Pulmonary  Within defined limits                 Neuro/Psych             Comments: Neurocardiogenic Syncope  Cardiovascular  Within defined limits                Exercise tolerance: >4 METS     GI/Hepatic/Renal  Within defined limits              Endo/Other  Within defined limits           Other Findings              Physical Exam    Airway  Mallampati: I  TM Distance: 4 - 6 cm  Neck ROM: normal range of motion   Mouth opening: Normal     Cardiovascular    Rhythm: regular  Rate: normal         Dental  No notable dental hx       Pulmonary  Breath sounds clear to auscultation               Abdominal         Other Findings            Anesthetic Plan    ASA: 1  Anesthesia type: MAC            Anesthetic plan and risks discussed with: Patient

## 2018-01-19 NOTE — ANESTHESIA POSTPROCEDURE EVALUATION
Post-Anesthesia Evaluation and Assessment    Patient: Jason Corcoran MRN: [id number]  SSN: [id number]    YOB: 1980  Age: 30 y.o. Sex: female       Cardiovascular Function/Vital Signs  Visit Vitals    BP 97/45    Pulse (!) 55    Temp 36.6 °C (97.9 °F)    Resp 13    Ht 5' 6\" (1.676 m)    Wt 64.4 kg (142 lb)    SpO2 100%    Breastfeeding No    BMI 22.92 kg/m2       Patient is status post MAC anesthesia for Procedure(s):  COLONOSCOPY. Nausea/Vomiting: None    Postoperative hydration reviewed and adequate. Pain:  Pain Scale 1: Numeric (0 - 10) (01/19/18 1131)  Pain Intensity 1: 0 (01/19/18 1131)   Managed    Neurological Status: At baseline    Mental Status and Level of Consciousness: Arousable    Pulmonary Status:   O2 Device: Room air (01/19/18 1131)   Adequate oxygenation and airway patent    Complications related to anesthesia: None    Post-anesthesia assessment completed.  No concerns    Signed By: Teresa Love MD     January 19, 2018

## 2018-01-19 NOTE — PERIOP NOTES
Patient tolerated procedure well. Report received from Keshav To CRNA. Patient was transported to recovery area and report was given to JOSE Bravo RN. Endoscope was pre-cleaned at bedside immediately following procedure by [facility] Endoscopy Technician.

## 2018-01-19 NOTE — IP AVS SNAPSHOT
[address] 
[phone] Patient: Sonny Pool MRN: [id number] About your hospitalization You were admitted on:  2018 You last received care in the:  [facility] ENDOSCOPY You were discharged on:  2018 Why you were hospitalized Your primary diagnosis was:  Not on File Follow-up Information None Discharge Orders None A check sridhar indicates which time of day the medication should be taken. My Medications CONTINUE taking these medications Instructions Each Dose to Equal  
 Morning Noon Evening Bedtime PRENATAL VITAMIN PO Your last dose was: Your next dose is: Take  by mouth. Discharge Instructions [address] [facility]. [facility] Rylee Gamino M.D. 
[phone] COLON DISCHARGE INSTRUCTIONS 
    
2018 Sonny Pool :  1980 [facility] Medical Record Number:  [id number] COLONOSCOPY FINDINGS: 
Your colonoscopy showed small internal hemorrhoids, otherwise appeared within normal. 
 
DISCOMFORT: 
Redness at IV site- apply warm compress to area; if redness or soreness persist- contact your physician There may be a slight amount of blood passed from the rectum Gaseous discomfort- walking, belching will help relieve any discomfort You may not operate a vehicle for 12 hours You may not engage in an occupation involving machinery or appliances for rest of today You may not drink alcoholic beverages for at least 12 hours Avoid making any critical decisions for at least 24 hour DIET: 
 High fiber diet.  however -  remember your colon is empty and a heavy meal will produce gas. Avoid these foods:  vegetables, fried / greasy foods, carbonated drinks for today ACTIVITY: 
You may resume your normal daily activities it is recommended that you spend the remainder of the day resting -  avoid any strenuous activity. CALL M.D. ANY SIGN OF: Increasing pain, nausea, vomiting Abdominal distension (swelling) New increased bleeding (oral or rectal) Fever (chills) Pain in chest area Bloody discharge from nose or mouth Shortness of breath Follow-up Instructions: 
 Call Dr. Chandni Lacey if any questions or problems. Telephone # [phone] Take fiber supplements daily, if bleeding is recurrent, will need office based rubber band ligation of the internal hemorrhoids. Should have a repeat colonoscopy in 5 years. Introducing [facility]! Dear Geoff Prescott: Thank you for requesting a [employer] account. Our records indicate that you already have an active [employer] account. You can access your account anytime at [email or web address] Did you know that you can access your hospital and ER discharge instructions at any time in [employer]? You can also review all of your test results from your hospital stay or ER visit. Additional Information If you have questions, please visit the Frequently Asked Questions section of the [employer] website at [email or web address] Remember, [employer] is NOT to be used for urgent needs. For medical emergencies, dial 911. Now available from your iPhone and Android! Providers Seen During Your Hospitalization Provider Specialty Primary office phone Hernan Connors MD Gastroenterology [phone] Your Primary Care Physician (PCP) Primary Care Physician Office Phone Office Fax Viveca Royalty [phone] You are allergic to the following No active allergies Recent Documentation Height Weight Breastfeeding? BMI OB Status Smoking Status 1.676 m 64.4 kg No 22.92 kg/m2 Having regular periods Never Smoker Emergency Contacts Name Discharge Info Relation Home Work Mobile [address] [facility] CAREGIVER [3] Spouse [3]   [phone] Patient Belongings The following personal items are in your possession at time of discharge: 
  Dental Appliances: None  Visual Aid: Glasses Please provide this summary of care documentation to your next provider. Signatures-by signing, you are acknowledging that this After Visit Summary has been reviewed with you and you have received a copy. Patient Signature:  ____________________________________________________________ Date:  ____________________________________________________________  
  
[facility] Provider Signature:  ____________________________________________________________ Date:  ____________________________________________________________

## 2018-01-19 NOTE — ROUTINE PROCESS
Radha Palacio  1980  [id number]    Situation:  Verbal report received from: Jd Guerra RN  Procedure: Procedure(s):  COLONOSCOPY    Background:    Preoperative diagnosis: diarrhea  Postoperative diagnosis: hemorrhoids    :  Dr. Emilee Middleton  Assistant(s): Endoscopy Technician-1: Barb Mobley  Endoscopy RN-2: Tegan Raymond RN    Specimens: * No specimens in log *  H. Pylori  no    Assessment:  Intra-procedure medications     Anesthesia gave intra-procedure sedation and medications, see anesthesia flow sheet yes    Intravenous fluids: NS@ KVO     Vital signs stable     Abdominal assessment: round and soft     Recommendation:  Discharge patient per MD order. Family or Friend   Permission to share finding with family or friend yes    Endoscopy discharge instructions have been reviewed and given to patient and spouse. The patient and spouse verbalized understanding and acceptance of instructions.

## 2018-01-19 NOTE — DISCHARGE INSTRUCTIONS
[facility]. Ayanna Polanco M.D.  [phone]            COLON DISCHARGE INSTRUCTIONS       2018    Petar Boyd  :  1980  [place] Medical Record Number:  [id number]      COLONOSCOPY FINDINGS:  Your colonoscopy showed small internal hemorrhoids, otherwise appeared within normal.    DISCOMFORT:  Redness at IV site- apply warm compress to area; if redness or soreness persist- contact your physician  There may be a slight amount of blood passed from the rectum  Gaseous discomfort- walking, belching will help relieve any discomfort  You may not operate a vehicle for 12 hours  You may not engage in an occupation involving machinery or appliances for rest of today  You may not drink alcoholic beverages for at least 12 hours  Avoid making any critical decisions for at least 24 hour  DIET:   High fiber diet. - however -  remember your colon is empty and a heavy meal will produce gas. Avoid these foods:  vegetables, fried / greasy foods, carbonated drinks for today     ACTIVITY:  You may resume your normal daily activities it is recommended that you spend the remainder of the day resting -  avoid any strenuous activity. CALL M.D. ANY SIGN OF:   Increasing pain, nausea, vomiting  Abdominal distension (swelling)  New increased bleeding (oral or rectal)  Fever (chills)  Pain in chest area  Bloody discharge from nose or mouth   Shortness of breath    Follow-up Instructions:   Call Dr. Luis Riley if any questions or problems. Telephone # [phone]  Take fiber supplements daily, if bleeding is recurrent, will need office based rubber band ligation of the internal hemorrhoids. Should have a repeat colonoscopy in 5 years.

## 2019-01-30 ENCOUNTER — OFFICE VISIT (OUTPATIENT)
Dept: INTERNAL MEDICINE CLINIC | Age: 39
End: 2019-01-30

## 2019-01-30 VITALS
SYSTOLIC BLOOD PRESSURE: 105 MMHG | WEIGHT: 150 LBS | BODY MASS INDEX: 24.11 KG/M2 | RESPIRATION RATE: 18 BRPM | DIASTOLIC BLOOD PRESSURE: 64 MMHG | OXYGEN SATURATION: 100 % | TEMPERATURE: 97.8 F | HEIGHT: 66 IN | HEART RATE: 51 BPM

## 2019-01-30 DIAGNOSIS — R68.89 FLU-LIKE SYMPTOMS: Primary | ICD-10-CM

## 2019-01-30 RX ORDER — OSELTAMIVIR PHOSPHATE 75 MG/1
75 CAPSULE ORAL 2 TIMES DAILY
Qty: 10 CAP | Refills: 0 | Status: SHIPPED | OUTPATIENT
Start: 2019-01-30 | End: 2019-02-04

## 2019-01-30 NOTE — PROGRESS NOTES
HISTORY OF PRESENT ILLNESS Horacio Oscar is a 41 y.o. female. HPI Flu-Like Symptoms: Pt started to experience body aches and low-grade fever (temperature not taken) yesterday. She also developed cough. Pt took Ibuprofen last night and this morning. She notes that daughter was tested positive for flu (Influenza A) today. She notes that  is also sick but tested negative for flu. Pt was tested negative for flu today in clinic. Review of Systems Respiratory: Positive for cough. All other systems reviewed and are negative. Physical Exam  
Constitutional: She is oriented to person, place, and time. She appears well-developed and well-nourished. HENT:  
Head: Normocephalic and atraumatic. Right Ear: External ear normal.  
Left Ear: External ear normal.  
Nose: Nose normal.  
Mouth/Throat: Oropharynx is clear and moist.  
Eyes: Conjunctivae and EOM are normal.  
Neck: Normal range of motion. Neck supple. Carotid bruit is not present. No thyroid mass and no thyromegaly present. Cardiovascular: Normal rate, regular rhythm, S1 normal, S2 normal, normal heart sounds and intact distal pulses. Pulmonary/Chest: Effort normal and breath sounds normal.  
Abdominal: Soft. Normal appearance and bowel sounds are normal. There is no hepatosplenomegaly. There is no tenderness. Musculoskeletal: Normal range of motion. Neurological: She is alert and oriented to person, place, and time. She has normal strength. No cranial nerve deficit or sensory deficit. Coordination normal.  
Skin: Skin is warm, dry and intact. No abrasion and no rash noted. Psychiatric: She has a normal mood and affect. Her behavior is normal. Judgment and thought content normal.  
Nursing note and vitals reviewed. ASSESSMENT and PLAN Diagnoses and all orders for this visit: 1. Flu-like symptoms Prescribed Tamiflu.  Pt was tested negative for flu today in clinic, but daughter was tested positive for flu (Influenza A) today. Will monitor for any changes or improvements. -     oseltamivir (TAMIFLU) 75 mg capsule; Take 1 Cap by mouth two (2) times a day for 5 days. Lab results and schedule of future lab studies reviewed with patient. Reviewed diet, exercise and weight control. Written by Shleia Bridges, as dictated by Janelle Powell MD.  
 
Current diagnosis and concerns discussed with pt at length. Understands risks and benefits or current treatment plan and medications and accepts the treatment and medication with any possible risks. Pt asks appropriate questions which were answered. Pt instructed to call with any concerns or problems. This note will not be viewable in [address]

## 2019-05-07 ENCOUNTER — OFFICE VISIT (OUTPATIENT)
Dept: INTERNAL MEDICINE CLINIC | Age: 39
End: 2019-05-07

## 2019-05-07 VITALS
WEIGHT: 151 LBS | HEIGHT: 66 IN | RESPIRATION RATE: 16 BRPM | SYSTOLIC BLOOD PRESSURE: 108 MMHG | TEMPERATURE: 98.2 F | DIASTOLIC BLOOD PRESSURE: 76 MMHG | OXYGEN SATURATION: 98 % | BODY MASS INDEX: 24.27 KG/M2 | HEART RATE: 49 BPM

## 2019-05-07 DIAGNOSIS — M54.6 ACUTE RIGHT-SIDED THORACIC BACK PAIN: ICD-10-CM

## 2019-05-07 DIAGNOSIS — S16.1XXA STRAIN OF NECK MUSCLE, INITIAL ENCOUNTER: Primary | ICD-10-CM

## 2019-05-07 DIAGNOSIS — M25.511 RIGHT SHOULDER PAIN, UNSPECIFIED CHRONICITY: ICD-10-CM

## 2019-05-07 RX ORDER — SODIUM FLUORIDE 1.1 G/100G
GEL, DENTIFRICE ORAL
Refills: 2 | COMMUNITY
Start: 2019-04-24 | End: 2019-06-19

## 2019-05-07 RX ORDER — IBUPROFEN 800 MG/1
800 TABLET ORAL
Qty: 30 TAB | Refills: 0 | Status: SHIPPED | OUTPATIENT
Start: 2019-05-07 | End: 2019-06-17 | Stop reason: ALTCHOICE

## 2019-05-07 RX ORDER — DIAZEPAM 2 MG/1
2 TABLET ORAL
Qty: 3 TAB | Refills: 0 | Status: SHIPPED | OUTPATIENT
Start: 2019-05-07 | End: 2019-05-10

## 2019-05-07 RX ORDER — CYCLOBENZAPRINE HCL 10 MG
10 TABLET ORAL
Qty: 20 TAB | Refills: 0 | Status: SHIPPED | OUTPATIENT
Start: 2019-05-07 | End: 2019-06-17 | Stop reason: ALTCHOICE

## 2019-05-07 RX ORDER — IBUPROFEN 200 MG
TABLET ORAL
COMMUNITY
End: 2019-05-07

## 2019-05-07 NOTE — PROGRESS NOTES
Johanna Campo is a 41 y.o. female who presents today for Stiff Neck Glory Alvaro She has a history of  
Patient Active Problem List  
Diagnosis Code  Neurocardiogenic syncope R55 Glory John Today patient is here for an acute visit. This is a patient of my partner Dr. Vladimir Vera. Musculoskeletal pain: She wishes to address discomfort in the Right cervical spine, upper back at today's visit. This has been moderate in nature, gradually after a workout doing new chest flies. Having a sharp pain to R shoulder. Stiffiness and pain to R upper back  but no  rash, erythema of joints, joint swelling has been noted by the patient. Self treatment: NSAIDs . she does not have a history of osteoarthritis, rheumatoid, gout joint disease. ROS Review of Systems Constitutional: Negative for chills, fever and weight loss. HENT: Negative for congestion and sore throat. Eyes: Negative for blurred vision, double vision and photophobia. Respiratory: Negative for cough and shortness of breath. Cardiovascular: Negative for chest pain, palpitations and leg swelling. Gastrointestinal: Negative for abdominal pain, constipation, diarrhea, heartburn, nausea and vomiting. Genitourinary: Negative for dysuria, frequency and urgency. Musculoskeletal: Positive for joint pain and neck pain. Negative for myalgias. Skin: Negative for rash. Neurological: Negative. Negative for headaches. Endo/Heme/Allergies: Does not bruise/bleed easily. Psychiatric/Behavioral: Negative for memory loss and suicidal ideas. Visit Vitals /76 (BP 1 Location: Left arm, BP Patient Position: Sitting) Pulse (!) 49 Temp 98.2 °F (36.8 °C) (Oral) Resp 16 Ht 5' 6\" (1.676 m) Wt 151 lb (68.5 kg) SpO2 98% BMI 24.37 kg/m² Physical Exam  
Constitutional: She is oriented to person, place, and time. Appears uncomfortable HENT:  
Head: Normocephalic and atraumatic. Cardiovascular: Normal rate and regular rhythm. No murmur heard. Pulmonary/Chest: Effort normal. No respiratory distress. Musculoskeletal:  
     Arms: 
Pain and area were noted. Pain with right rotation of head. No spinal point tenderness. No shoulder pain with passive range of motion. Neurological: She is alert and oriented to person, place, and time. Skin: Skin is warm and dry. Psychiatric: She has a normal mood and affect. Her behavior is normal.  
 
 
 
Current Outpatient Medications Medication Sig  
 DENTA 5000 PLUS 1.1 % crea USE AT BEDTIME. DO NOT RINSE.  ibuprofen (MOTRIN) 800 mg tablet Take 1 Tab by mouth every eight (8) hours as needed for Pain.  cyclobenzaprine (FLEXERIL) 10 mg tablet Take 1 Tab by mouth three (3) times daily as needed for Muscle Spasm(s).  diazePAM (VALIUM) 2 mg tablet Take 1 Tab by mouth nightly for 3 days. Max Daily Amount: 2 mg.  PRENATAL VIT CALC,IRON,FOLIC (PRENATAL VITAMIN PO) Take  by mouth. No current facility-administered medications for this visit. Past Medical History:  
Diagnosis Date  Neurocardiogenic syncope Past Surgical History:  
Procedure Laterality Date  COLONOSCOPY N/A 1/19/2018 COLONOSCOPY performed by Tatyana Kelly MD at [address] [facility] HX ABDOMINAL LAPAROSCOPY    
 HX HEENT    
 wisdom teeth extraction  HX LEEP PROCEDURE    
 HX OTHER SURGICAL  2009 D&E Social History Tobacco Use  Smoking status: Never Smoker  Smokeless tobacco: Never Used Substance Use Topics  Alcohol use: Yes Alcohol/week: 1.2 oz Types: 2 Glasses of wine per week Comment: weekly Family History Problem Relation Age of Onset  Hypertension Mother  Elevated Lipids Mother  Colon Polyps Mother  Hypertension Father  Elevated Lipids Father  Colon Polyps Father  No Known Problems Sister  Bleeding Prob Brother ITP  Cancer Maternal Grandmother   
     colon cancer  Cancer Paternal Grandmother colon cancer  Heart Disease Paternal Grandfather  Hypertension Paternal Grandfather  No Known Problems Sister  No Known Problems Brother  No Known Problems Daughter No Known Allergies Assessment/Plan Diagnoses and all orders for this visit: 1. Strain of neck muscle, initial encounter -no red flags on exam findings. Significant stiffness of muscles. We discussed using ice and anti-inflammatories. Patient to take Flexeril during the day and Valium for the first several nights for muscle relaxation. Discussed range of motion activities. Patient to limit working out over the next couple days. -     ibuprofen (MOTRIN) 800 mg tablet; Take 1 Tab by mouth every eight (8) hours as needed for Pain. -     cyclobenzaprine (FLEXERIL) 10 mg tablet; Take 1 Tab by mouth three (3) times daily as needed for Muscle Spasm(s). -     diazePAM (VALIUM) 2 mg tablet; Take 1 Tab by mouth nightly for 3 days. Max Daily Amount: 2 mg. 2. Right shoulder pain, unspecified chronicity -     ibuprofen (MOTRIN) 800 mg tablet; Take 1 Tab by mouth every eight (8) hours as needed for Pain. -     cyclobenzaprine (FLEXERIL) 10 mg tablet; Take 1 Tab by mouth three (3) times daily as needed for Muscle Spasm(s). -     diazePAM (VALIUM) 2 mg tablet; Take 1 Tab by mouth nightly for 3 days. Max Daily Amount: 2 mg. 3. Acute right-sided thoracic back pain 
-     ibuprofen (MOTRIN) 800 mg tablet; Take 1 Tab by mouth every eight (8) hours as needed for Pain. -     cyclobenzaprine (FLEXERIL) 10 mg tablet; Take 1 Tab by mouth three (3) times daily as needed for Muscle Spasm(s). -     diazePAM (VALIUM) 2 mg tablet; Take 1 Tab by mouth nightly for 3 days. Max Daily Amount: 2 mg. Ez Hong MD 
5/7/2019 This note was created with the help of speech recognition software (Dragon) and may contain some 'sound alike' errors.

## 2019-05-07 NOTE — PATIENT INSTRUCTIONS
Neck Spasm: Exercises Your Care Instructions Here are some examples of typical rehabilitation exercises for your condition. Start each exercise slowly. Ease off the exercise if you start to have pain. Your doctor or physical therapist will tell you when you can start these exercises and which ones will work best for you. How to do the exercises Levator scapula stretch 1. Sit in a firm chair, or stand up straight. 2. Gently tilt your head toward your left shoulder. 3. Turn your head to look down into your armpit, bending your head slightly forward. Let the weight of your head stretch your neck muscles. 4. Hold for 15 to 30 seconds. 5. Return to your starting position. 6. Follow the same instructions above, but tilt your head toward your right shoulder. 7. Repeat 2 to 4 times toward each shoulder. Upper trapezius stretch 1. Sit in a firm chair, or stand up straight. 2. This stretch works best if you keep your shoulder down as you lean away from it. To help you remember to do this, start by relaxing your shoulders and lightly holding on to your thighs or your chair. 3. Tilt your head toward your shoulder and hold for 15 to 30 seconds. Let the weight of your head stretch your muscles. 4. If you would like a little added stretch, place your arm behind your back. Use the arm opposite of the direction you are tilting your head. For example, if you are tilting your head to the left, place your right arm behind your back. 5. Repeat 2 to 4 times toward each shoulder. Neck rotation 1. Sit in a firm chair, or stand up straight. 2. Keeping your chin level, turn your head to the right, and hold for 15 to 30 seconds. 3. Turn your head to the left, and hold for 15 to 30 seconds. 4. Repeat 2 to 4 times to each side. Chin tuck 1. Lie on the floor with a rolled-up towel under your neck. Your head should be touching the floor. 2. Slowly bring your chin toward the front of your neck. 3. Hold for a count of 6, and then relax for up to 10 seconds. 4. Repeat 8 to 12 times. Forward neck flexion 1. Sit in a firm chair, or stand up straight. 2. Bend your head forward. 3. Hold for 15 to 30 seconds, then return to your starting position. 4. Repeat 2 to 4 times. Follow-up care is a key part of your treatment and safety. Be sure to make and go to all appointments, and call your doctor if you are having problems. It's also a good idea to know your test results and keep a list of the medicines you take. Where can you learn more? Go to http://ghassan-martina.info/. Enter P962 in the search box to learn more about \"Neck Spasm: Exercises. \" Current as of: September 20, 2018 Content Version: 11.9 © 3658-2381 [employer]. Care instructions adapted under license by [employer] (which disclaims liability or warranty for this information). If you have questions about a medical condition or this instruction, always ask your healthcare professional. [address] any warranty or liability for your use of this information.

## 2019-05-07 NOTE — PROGRESS NOTES
Pt is in the office for neck pain and stiffness that started last Wednesday. Pt has been taking ibuprofin, and alternating heat and ice.

## 2019-05-17 ENCOUNTER — OFFICE VISIT (OUTPATIENT)
Dept: INTERNAL MEDICINE CLINIC | Age: 39
End: 2019-05-17

## 2019-05-17 VITALS
WEIGHT: 148.4 LBS | OXYGEN SATURATION: 98 % | SYSTOLIC BLOOD PRESSURE: 114 MMHG | HEIGHT: 66 IN | BODY MASS INDEX: 23.85 KG/M2 | RESPIRATION RATE: 12 BRPM | DIASTOLIC BLOOD PRESSURE: 76 MMHG | TEMPERATURE: 98.3 F | HEART RATE: 65 BPM

## 2019-05-17 DIAGNOSIS — F32.A ANXIETY AND DEPRESSION: ICD-10-CM

## 2019-05-17 DIAGNOSIS — Z00.00 WELL WOMAN EXAM (NO GYNECOLOGICAL EXAM): Primary | ICD-10-CM

## 2019-05-17 DIAGNOSIS — F41.9 ANXIETY AND DEPRESSION: ICD-10-CM

## 2019-05-17 RX ORDER — SERTRALINE HYDROCHLORIDE 25 MG/1
25 TABLET, FILM COATED ORAL DAILY
Qty: 30 TAB | Refills: 2 | Status: SHIPPED | OUTPATIENT
Start: 2019-05-17 | End: 2019-06-19

## 2019-05-17 NOTE — PATIENT INSTRUCTIONS
Depression Treatment: Care Instructions Your Care Instructions Depression is a condition that affects the way you feel, think, and act. It causes symptoms such as low energy, loss of interest in daily activities, and sadness or grouchiness that goes on for a long time. Depression is very common and affects men and women of all ages. Depression is a medical illness caused by changes in the natural chemicals in your brain. It is not a character flaw, and it does not mean that you are a bad or weak person. It does not mean that you are going crazy. It is important to know that depression can be treated. Medicines, counseling, and self-care can all help. Many people do not get help because they are embarrassed or think that they will get over the depression on their own. But some people do not get better without treatment. Follow-up care is a key part of your treatment and safety. Be sure to make and go to all appointments, and call your doctor if you are having problems. It's also a good idea to know your test results and keep a list of the medicines you take. How can you care for yourself at home? Learn about antidepressant medicines Antidepressant medicines can improve or end the symptoms of depression. You may need to take the medicine for at least 6 months, and often longer. Keep taking your medicine even if you feel better. If you stop taking it too soon, your symptoms may come back or get worse. You may start to feel better within 1 to 3 weeks of taking antidepressant medicine. But it can take as many as 6 to 8 weeks to see more improvement. Talk to your doctor if you have problems with your medicine or if you do not notice any improvement after 3 weeks. Antidepressants can make you feel tired, dizzy, or nervous. Some people have dry mouth, constipation, headaches, sexual problems, an upset stomach, or diarrhea.  Many of these side effects are mild and go away on their own after you take the medicine for a few weeks. Some may last longer. Talk to your doctor if side effects bother you too much. You might be able to try a different medicine. If you are pregnant or breastfeeding, talk to your doctor about what medicines you can take. Learn about counseling In many cases, counseling can work as well as medicines to treat mild to moderate depression. Counseling is done by licensed mental health providers, such as psychologists, social workers, and some types of nurses. It can be done in one-on-one sessions or in a group setting. Many people find group sessions helpful. Cognitive-behavioral therapy is a type of counseling. In this treatment therapy, you learn how to see and change unhelpful thinking styles that may be adding to your depression. Counseling and medicines often work well when used together. To manage depression · Be physically active. Getting 30 minutes of exercise each day is good for your body and your mind. Begin slowly if it is hard for you to get started. If you already exercise, keep it up. · Plan something pleasant for yourself every day. Include activities that you have enjoyed in the past. 
· Get enough sleep. Talk to your doctor if you have problems sleeping. · Eat a balanced diet. If you do not feel hungry, eat small snacks rather than large meals. · Do not drink alcohol, use illegal drugs, or take medicines that your doctor has not prescribed for you. They may interfere with your treatment. · Spend time with family and friends. It may help to speak openly about your depression with people you trust. 
· Take your medicines exactly as prescribed. Call your doctor if you think you are having a problem with your medicine. · Do not make major life decisions while you are depressed. Depression may change the way you think. You will be able to make better decisions after you feel better. · Think positively. Challenge negative thoughts with statements such as \"I am hopeful\"; \"Things will get better\"; and \"I can ask for the help I need. \" Write down these statements and read them often, even if you don't believe them yet. · Be patient with yourself. It took time for your depression to develop, and it will take time for your symptoms to improve. Do not take on too much or be too hard on yourself. · Learn all you can about depression from written and online materials. · Check out behavioral health classes to learn more about dealing with depression. · Keep the numbers for these national suicide hotlines: 3-431-551-TALK (0-152.663.6732) and 4-795-DFGCPPJ (0-355.525.9765). If you or someone you know talks about suicide or feeling hopeless, get help right away. When should you call for help? Call 911 anytime you think you may need emergency care. For example, call if: 
  · You feel you cannot stop from hurting yourself or someone else.  
[facility] your doctor now or seek immediate medical care if: 
  · You hear voices.  
  · You feel much more depressed.  
 Watch closely for changes in your health, and be sure to contact your doctor if: 
  · You are having problems with your depression medicine.  
  · You are not getting better as expected. Where can you learn more? Go to http://ghassan-martina.info/. Enter G438 in the search box to learn more about \"Depression Treatment: Care Instructions. \" Current as of: September 11, 2018 Content Version: 11.9 © 4784-9939 [employer]. Care instructions adapted under license by [employer] (which disclaims liability or warranty for this information). If you have questions about a medical condition or this instruction, always ask your healthcare professional. [place] [address] any warranty or liability for your use of this information. Well Visit, Ages 25 to 48: Care Instructions Your Care Instructions Physical exams can help you stay healthy. Your doctor has checked your overall health and may have suggested ways to take good care of yourself. He or she also may have recommended tests. At home, you can help prevent illness with healthy eating, regular exercise, and other steps. Follow-up care is a key part of your treatment and safety. Be sure to make and go to all appointments, and call your doctor if you are having problems. It's also a good idea to know your test results and keep a list of the medicines you take. How can you care for yourself at home? · Reach and stay at a healthy weight. This will lower your risk for many problems, such as obesity, diabetes, heart disease, and high blood pressure. · Get at least 30 minutes of physical activity on most days of the week. Walking is a good choice. You also may want to do other activities, such as running, swimming, cycling, or playing tennis or team sports. Discuss any changes in your exercise program with your doctor. · Do not smoke or allow others to smoke around you. If you need help quitting, talk to your doctor about stop-smoking programs and medicines. These can increase your chances of quitting for good. · Talk to your doctor about whether you have any risk factors for sexually transmitted infections (STIs). Having one sex partner (who does not have STIs and does not have sex with anyone else) is a good way to avoid these infections. · Use birth control if you do not want to have children at this time. Talk with your doctor about the choices available and what might be best for you. · Protect your skin from too much sun. When you're outdoors from 10 a.m. to 4 p.m., stay in the shade or cover up with clothing and a hat with a wide brim. Wear sunglasses that block UV rays. Even when it's cloudy, put broad-spectrum sunscreen (SPF 30 or higher) on any exposed skin. · See a dentist one or two times a year for checkups and to have your teeth cleaned. · Wear a seat belt in the car. · Drink alcohol in moderation, if at all. That means no more than 2 drinks a day for men and 1 drink a day for women. Follow your doctor's advice about when to have certain tests. These tests can spot problems early. For everyone · Cholesterol. Have the fat (cholesterol) in your blood tested after age 21. Your doctor will tell you how often to have this done based on your age, family history, or other things that can increase your risk for heart disease. · Blood pressure. Have your blood pressure checked during a routine doctor visit. Your doctor will tell you how often to check your blood pressure based on your age, your blood pressure results, and other factors. · Vision. Talk with your doctor about how often to have a glaucoma test. 
· Diabetes. Ask your doctor whether you should have tests for diabetes. · Colon cancer. Have a test for colon cancer at age 48. You may have one of several tests. If you are younger than 48, you may need a test earlier if you have any risk factors. Risk factors include whether you already had a precancerous polyp removed from your colon or whether your parent, brother, sister, or child has had colon cancer. For women · Breast exam and mammogram. Talk to your doctor about when you should have a clinical breast exam and a mammogram. Medical experts differ on whether and how often women under 50 should have these tests. Your doctor can help you decide what is right for you. · Pap test and pelvic exam. Begin Pap tests at age 24. A Pap test is the best way to find cervical cancer. The test often is part of a pelvic exam. Ask how often to have this test. 
· Tests for sexually transmitted infections (STIs). Ask whether you should have tests for STIs. You may be at risk if you have sex with more than one person, especially if your partners do not wear condoms. For men · Tests for sexually transmitted infections (STIs). Ask whether you should have tests for STIs. You may be at risk if you have sex with more than one person, especially if you do not wear a condom. · Testicular cancer exam. Ask your doctor whether you should check your testicles regularly. · Prostate exam. Talk to your doctor about whether you should have a blood test (called a PSA test) for prostate cancer. Experts differ on whether and when men should have this test. Some experts suggest it if you are older than 39 and are -American or have a father or brother who got prostate cancer when he was younger than 72. When should you call for help? Watch closely for changes in your health, and be sure to contact your doctor if you have any problems or symptoms that concern you. Where can you learn more? Go to http://ghassan-martina.info/. Enter P072 in the search box to learn more about \"Well Visit, Ages 25 to 48: Care Instructions. \" Current as of: March 28, 2018 Content Version: 11.9 © 7980-6390 [employer]. Care instructions adapted under license by [employer] (which disclaims liability or warranty for this information). If you have questions about a medical condition or this instruction, always ask your healthcare professional. [place] [address] any warranty or liability for your use of this information. Hypoglycemia: Care Instructions Your Care Instructions Hypoglycemia means that your blood sugar is low and your body is not getting enough fuel. Some people get low blood sugar from not eating often enough. Some medicines to treat diabetes can cause low blood sugar. People who have had surgery on their stomachs or intestines may get hypoglycemia. Problems with the pancreas, kidneys, or liver also can cause low blood sugar.  
A snack or drink with sugar in it will raise your blood sugar and should ease your symptoms right away. Your doctor may recommend that you change or stop your medicines until you can get your blood sugar levels under control. In the long run, you may need to change your diet and eating habits so that you get enough fuel for your body throughout the day. Follow-up care is a key part of your treatment and safety. Be sure to make and go to all appointments, and call your doctor if you are having problems. It's also a good idea to know your test results and keep a list of the medicines you take. How can you care for yourself at home? · Learn to recognize the early signs of low blood sugar. Signs include: 
? Nausea. ? Hunger. ? Feeling nervous, irritable, or shaky. ? Cold, clammy, wet skin. ? Sweating (when you are not exercising). ? A fast heartbeat. 
? Numbness or tingling of the fingertips or lips. · If you feel an episode of low blood sugar coming on, drink fruit juice or sugared (not diet) soda, or eat sugar in the form of candy, cubes, or tablets. [employer] are another [employer]. · Eat small, frequent meals so that you do not get too hungry between meals. · Balance extra exercise with eating more. · Keep a written record of your low blood sugar episodes, including when you last ate and what you ate, so that you can learn what causes your blood sugar to drop. · Make sure your family, friends, and coworkers know the symptoms of low blood sugar and know what to do to get your sugar level up. · Wear medical alert jewelry that lists your condition. You can buy this at most drugstores. When should you call for help? Call 911 anytime you think you may need emergency care. For example, call if: 
  · You passed out (lost consciousness).  
  · You are confused or cannot think clearly.  
  · Your blood sugar is very high or very low.  
 Watch closely for changes in your health, and be sure to contact your doctor if:   · Your blood sugar stays outside the level your doctor set for you.  
  · You have any problems. Where can you learn more? Go to http://ghassan-martina.info/. Enter W702 in the search box to learn more about \"Hypoglycemia: Care Instructions. \" Current as of: July 25, 2018 Content Version: 11.9 © 5637-1831 [email or web address]. Care instructions adapted under license by [employer] (which disclaims liability or warranty for this information). If you have questions about a medical condition or this instruction, always ask your healthcare professional. [address] any warranty or liability for your use of this information.

## 2019-05-17 NOTE — PROGRESS NOTES
Subjective:  
42 y.o. female for Well Woman Check. Her gyne and breast care is done elsewhere by her Ob-Gyne physician. Sees Dr Federico Edmond for GYN care and is up to date with pap/pelvic exam. 
 
Had normal colonoscopy in 1/2018 due to family history of colon cancer and is due to follow up in 2023. Told she has internal hemorrhoids; sometimes has issues with evacuating her bowels. Denies blood in stools. Reports that she has been having prolonged periods of anxiety/depression for months now that is interfering with her ability to function. Feels like symptoms began after she and her  decided not to pursue further fertility attempts and this made her quite sad. Reports she tends to be very sensitive and has been doing a lot of overthinking previous conversations with people and ruminating on her responses. Has been having crying episodes and overall feeling more emotionally fragile. Denies harmful thoughts toward self or others. Reports being treated for situational depression briefly in 2005 but cannot recall what she took at that time. Has been having difficulty falling asleep due to racing thoughts. Current Outpatient Medications Medication Sig Dispense Refill  sertraline (ZOLOFT) 25 mg tablet Take 1 Tab by mouth daily. 30 Tab 2  
 DENTA 5000 PLUS 1.1 % crea USE AT BEDTIME. DO NOT RINSE.  2  
 ibuprofen (MOTRIN) 800 mg tablet Take 1 Tab by mouth every eight (8) hours as needed for Pain. 30 Tab 0  cyclobenzaprine (FLEXERIL) 10 mg tablet Take 1 Tab by mouth three (3) times daily as needed for Muscle Spasm(s). 20 Tab 0 No Known Allergies Past Medical History:  
Diagnosis Date  Neurocardiogenic syncope Past Surgical History:  
Procedure Laterality Date  COLONOSCOPY N/A 1/19/2018 COLONOSCOPY performed by [facility] at [facility] HX ABDOMINAL LAPAROSCOPY    
 HX HEENT    
 wisdom teeth extraction  HX LEEP PROCEDURE    
 HX OTHER SURGICAL  2009  D&E  
 
 Family History Problem Relation Age of Onset  Hypertension Mother  Elevated Lipids Mother  Colon Polyps Mother  Hypertension Father  Elevated Lipids Father  Colon Polyps Father  No Known Problems Sister  Bleeding Prob Brother ITP  Cancer Maternal Grandmother   
     colon cancer  Cancer Paternal Grandmother   
     colon cancer  Heart Disease Paternal Grandfather  Hypertension Paternal Grandfather  No Known Problems Sister  No Known Problems Brother  No Known Problems Daughter Social History Tobacco Use  Smoking status: Never Smoker  Smokeless tobacco: Never Used Substance Use Topics  Alcohol use: Yes Alcohol/week: 1.2 oz Types: 2 Glasses of wine per week Comment: weekly ROS: Feeling generally well. No TIA's or unusual headaches, no dysphagia. No prolonged cough. No dyspnea or chest pain on exertion. No abdominal pain, change in bowel habits, black or bloody stools. No urinary tract symptoms. No new or unusual musculoskeletal symptoms. Specific concerns today: depression/anxiety. Objective: The patient appears well, alert, oriented x 3, in no distress. Visit Vitals /76 (BP 1 Location: Left arm, BP Patient Position: Sitting) Pulse 65 Temp 98.3 °F (36.8 °C) (Oral) Resp 12 Ht 5' 6\" (1.676 m) Wt 148 lb 6.4 oz (67.3 kg) LMP 04/21/2019 (Approximate) SpO2 98% BMI 23.95 kg/m² ENT normal.  Neck supple. No adenopathy or thyromegaly. EDER. Lungs are clear, good air entry, no wheezes, rhonchi or rales. S1 and S2 normal, no murmurs, regular rate and rhythm. Abdomen soft without tenderness, guarding, mass or organomegaly. Extremities show no edema, normal peripheral pulses. Neurological is normal, no focal findings. Became tearful during visit but appropriate speech. Breast and Pelvic exams are deferred. Assessment/Plan:  
Well Woman continue present plan, routine labs ordered Diagnoses and all orders for this visit: 
 
1. Well woman exam (no gynecological exam) -     CBC WITH AUTOMATED DIFF 
-     METABOLIC PANEL, COMPREHENSIVE 
-     LIPID PANEL 
-     TSH 3RD GENERATION 
-     T4, FREE 
-     URINALYSIS W/ RFLX MICROSCOPIC 2. Anxiety and depression --we will start her on low-dose sertraline 25 mg daily and bring her back in 1 month for recheck. Contracted with patient for safety. -     sertraline (ZOLOFT) 25 mg tablet; Take 1 Tab by mouth daily. Discussed deep breathing exercises, yoga, exercise. Avoid caffeine and cold medicines. Reviewed concept of anxiety as biochemical imbalance of neurotransmitters and rationale for treatment. Instructed patient to contact office or on-call physician promptly should condition worsen or any new symptoms appear and provided on-call telephone numbers. IF THE PATIENT HAS ANY SUICIDAL OR HOMICIDAL IDEATION, CALL THE OFFICE, DISCUSS WITH A SUPPORT MEMBER OR GO TO THE ER IMMEDIATELY. Patient was agreeable with this plan. Traci Roxana was counseled on age-appropriate/ guideline-based risk prevention behaviors and screening for a 42y.o. year old   female . We also discussed adjustments in screening based on family history if necessary. Printed instructions for preventative screening guidelines and healthy behaviors given to patient with after visit summary. lab results and schedule of future lab studies reviewed with patient 
reviewed diet, exercise and weight control 
cardiovascular risk and specific lipid/LDL goals reviewed 
reviewed medications and side effects in detail This note will not be viewable in [address]

## 2019-06-11 LAB
HBSAG, EXTERNAL: NORMAL
HCT, EXTERNAL: 36.8
HGB, EXTERNAL: 12.4
HIV, EXTERNAL: NORMAL
PLATELET CNT,   EXTERNAL: 317
RPR, EXTERNAL: NORMAL
RUBELLA, EXTERNAL: NORMAL
TSH SERPL-ACNC: 2.14 M[IU]/L
TYPE, ABO & RH, EXTERNAL: NORMAL

## 2019-06-13 DIAGNOSIS — O09.529 ANTEPARTUM MULTIGRAVIDA OF ADVANCED MATERNAL AGE: ICD-10-CM

## 2019-06-17 ENCOUNTER — OFFICE VISIT (OUTPATIENT)
Dept: INTERNAL MEDICINE CLINIC | Age: 39
End: 2019-06-17

## 2019-06-17 VITALS
BODY MASS INDEX: 23.63 KG/M2 | TEMPERATURE: 98.2 F | DIASTOLIC BLOOD PRESSURE: 78 MMHG | HEIGHT: 66 IN | SYSTOLIC BLOOD PRESSURE: 115 MMHG | RESPIRATION RATE: 18 BRPM | HEART RATE: 64 BPM | WEIGHT: 147 LBS | OXYGEN SATURATION: 97 %

## 2019-06-17 DIAGNOSIS — F32.A ANXIETY AND DEPRESSION: Primary | ICD-10-CM

## 2019-06-17 DIAGNOSIS — F41.9 ANXIETY AND DEPRESSION: Primary | ICD-10-CM

## 2019-06-17 DIAGNOSIS — O09.529 ANTEPARTUM MULTIGRAVIDA OF ADVANCED MATERNAL AGE: ICD-10-CM

## 2019-06-17 NOTE — PROGRESS NOTES
HISTORY OF PRESENT ILLNESS  Dipti Mcadams is a 42 y.o. female. HPI  Presents for follow up depression and anxiety; feels like her moods have been improved since starting Sertraline 25 mg and wishes to remain on this. Has recently found out that she is pregnant and has had her first prenatal visit with Dr Dangelo Dash who is aware that she is on Sertraline. She has to change OB-GYN due to insurance and has her first visit with Dr Elisha Malone on 6/19. Initially had some insomnia when she first started taking the Sertraline but this has improved. Denies any other side effects from medication and this point she would like to remain on current dose. LMP 4/11/19; EDC: 1/28/2020    Patient Active Problem List   Diagnosis Code    Neurocardiogenic syncope R55    Antepartum multigravida of advanced maternal age O12.46     Past Surgical History:   Procedure Laterality Date    COLONOSCOPY N/A 1/19/2018    COL[facility]COPY performed by Lisa Gonzalez MD at [facility] ENDOSCOPY    HX ABDOMINAL LAPAROSCOPY  10/2009    r/o ectopic    HX DILATION AND EVACUATION  10/2009    SAB    HX GYN  08/05/2013    [facility]    HX LEEP PROCEDURE  2001 + 5/2004    GOVIND II    HX WISDOM TEETH EXTRACTION       Social History     Socioeconomic History    Marital status:      Spouse name: Not on file    Number of children: Not on file    Years of education: Not on file    Highest education level: Not on file   Occupational History    Not on file   Social Needs    Financial resource strain: Not on file    Food insecurity:     Worry: Not on file     Inability: Not on file    Transportation needs:     Medical: Not on file     Non-medical: Not on file   Tobacco Use    Smoking status: Never Smoker    Smokeless tobacco: Never Used   Substance and Sexual Activity    Alcohol use:  Yes     Alcohol/week: 1.2 oz     Types: 2 Glasses of wine per week     Comment: weekly    Drug use: No    Sexual activity: Yes     Partners: Male     Birth control/protection: None   Lifestyle    Physical activity:     Days per week: Not on file     Minutes per session: Not on file    Stress: Not on file   Relationships    Social connections:     Talks on phone: Not on file     Gets together: Not on file     Attends Restoration service: Not on file     Active member of club or organization: Not on file     Attends meetings of clubs or organizations: Not on file     Relationship status: Not on file    Intimate partner violence:     Fear of current or ex partner: Not on file     Emotionally abused: Not on file     Physically abused: Not on file     Forced sexual activity: Not on file   Other Topics Concern    Not on file   Social History Narrative    Not on file     Family History   Problem Relation Age of Onset    Hypertension Mother    Deandra.Code Elevated Lipids Mother     Colon Polyps Mother     Hypertension Father    Deandra.Code Elevated Lipids Father     Colon Polyps Father     No Known Problems Sister     Bleeding Prob Brother         ITP    Cancer Maternal Grandmother         colon cancer    Cancer Paternal Grandmother         colon cancer    Heart Disease Paternal Grandfather     Hypertension Paternal Grandfather     No Known Problems Sister     No Known Problems Brother     No Known Problems Daughter      Current Outpatient Medications   Medication Sig    PNV No12-Iron-FA-DSS-OM-3 29 mg iron-1 mg -50 mg CPKD Take  by mouth.  sertraline (ZOLOFT) 25 mg tablet Take 1 Tab by mouth daily.  DENTA 5000 PLUS 1.1 % crea USE AT BEDTIME. DO NOT RINSE. No current facility-administered medications for this visit. No Known Allergies  Immunization History   Administered Date(s) Administered    Influenza Vaccine (Quad) PF 11/05/2015, 10/04/2017    MMR 08/18/2010    TB Skin Test (PPD) Intradermal 11/10/2015    Tdap 08/18/2010       Review of Systems   Constitutional: Negative for chills and fever. HENT: Negative for congestion and sore throat. Respiratory: Negative for cough. Cardiovascular: Negative for chest pain and palpitations. Musculoskeletal: Negative for myalgias. Neurological: Negative for dizziness and headaches. /78 (BP 1 Location: Left arm, BP Patient Position: Sitting)   Pulse 64   Temp 98.2 °F (36.8 °C) (Oral)   Resp 18   Ht 5' 6\" (1.676 m)   Wt 147 lb (66.7 kg)   LMP 04/11/2019   SpO2 97%   BMI 23.73 kg/m²   Physical Exam   Constitutional: She is oriented to person, place, and time. She appears well-developed and well-nourished. HENT:   Head: Normocephalic and atraumatic. Cardiovascular: Normal rate and regular rhythm. Pulmonary/Chest: Effort normal and breath sounds normal. She has no wheezes. Neurological: She is alert and oriented to person, place, and time. Skin: Skin is warm and dry. Psychiatric: She has a normal mood and affect. Her behavior is normal.   Nursing note and vitals reviewed. ASSESSMENT and PLAN  Diagnoses and all orders for this visit:    1. Anxiety and depression -- doing well on low dose Sertraline; discussed staying on medication versus tapering off and she will return to office if she desires to come off medication.       2. Antepartum multigravida of advanced maternal age      lab results and schedule of future lab studies reviewed with patient  reviewed diet, exercise and weight control  reviewed medications and side effects in detail

## 2019-06-17 NOTE — PATIENT INSTRUCTIONS

## 2019-06-19 ENCOUNTER — OFFICE VISIT (OUTPATIENT)
Dept: OBGYN CLINIC | Age: 39
End: 2019-06-19

## 2019-06-19 VITALS
HEIGHT: 66 IN | DIASTOLIC BLOOD PRESSURE: 75 MMHG | BODY MASS INDEX: 23.69 KG/M2 | WEIGHT: 147.4 LBS | SYSTOLIC BLOOD PRESSURE: 120 MMHG

## 2019-06-19 DIAGNOSIS — Z3A.08 8 WEEKS GESTATION OF PREGNANCY: ICD-10-CM

## 2019-06-19 DIAGNOSIS — O09.529 SUPERVISION OF MULTIGRAVIDA OF ADVANCED MATERNAL AGE, ANTEPARTUM: Primary | ICD-10-CM

## 2019-06-19 LAB
BILIRUB UR QL STRIP: NEGATIVE
CHLAMYDIA, EXTERNAL: NORMAL
GLUCOSE UR-MCNC: NEGATIVE MG/DL
KETONES P FAST UR STRIP-MCNC: NEGATIVE MG/DL
N. GONORRHEA, EXTERNAL: NORMAL
PAP SMEAR, EXTERNAL: NORMAL
PH UR STRIP: NORMAL [PH] (ref 4.6–8)
PROT UR QL STRIP: NEGATIVE
SP GR UR STRIP: NORMAL (ref 1–1.03)
UA UROBILINOGEN AMB POC: NORMAL (ref 0.2–1)
URINALYSIS CLARITY POC: CLEAR
URINALYSIS COLOR POC: YELLOW
URINALYSIS, EXTERNAL: NORMAL
URINE BLOOD POC: NEGATIVE
URINE LEUKOCYTES POC: NEGATIVE
URINE NITRITES POC: NEGATIVE

## 2019-06-19 NOTE — PROGRESS NOTES
Current pregnancy history:    Kassandra Marcelino is a 42 y.o. female who presents for the evaluation of pregnancy. Patient's last menstrual period was 04/11/2019. Patient was [facility] on 6/13/19, she had blood work done. LMP history:  The date of her LMP is  certain. Her last menstrual period was normal and lasted for 4 to 5 days. A urine pregnancy test was positive on 6/4/19. She was not on the pill at conception. Based on her LMP, her EDC is 1/16/20 and her EGA is 9 weeks, 6 days. Her menstrual cycles are regular and occur approximately every 28 days  and range from 3 to 5 days. The last menses lasted  the usual number of days. Ultrasound data:  She had an  ultrasound done by the ultrasound tech today which revealed a viable villa pregnancy with a gestational age of 11 weeks and 4 days giving an [address] of 1/25/20. TA ULTRASOUND PERFORMED  A SINGLE VIABLE 8W4D IUP IS SEEN WITH NORMAL CARDIAC RHYTHM. GESTATIONAL AGE BASED ON [facility] ULTRASOUND. A NORMAL YOLK SAC IS SEEN. RIGHT OVARY APPEARS WITHIN NORMAL LIMITS. LEFT OVARY APPEARS WITHIN NORMAL LIMITS. NO FREE FLUID SEEN IN THE CDS. Pregnancy symptoms:    Since her LMP she has experienced  urinary frequency, breast tenderness, and nausea. She has not been vomiting over the last few weeks. Associated signs and symptoms which she denies: dysuria, discharge, vaginal bleeding. She states she has gained weight:  Approximately 5 pounds over the last few weeks. Relevant past pregnancy history:   She has the following pregnancy history:    HX of SAB X1 with twins   Hx of infertility-saw IVF, several attempts unsuccessful      Relevant past medical history:(relevant to this pregnancy): noncontributory. 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 Substance history: negative for alcohol, tobacco and street drugs.            Positive for nothing. Exposure history: There are no indoor cat/s in the home. She admits close contact with children on a regular basis. She has had chicken pox  in the past.   Patient denies issues with domestic violence. Genetic Screening/Teratology Counseling: (Includes patient, baby's father, or anyone in either family with:)  3.  Patient's age >/= 28 at [facility]?-- 42 at delivery. 2.  Thalassemia ([facility], [facility], [address], or  background): MCV<80?--no.     3.  Neural tube defect (meningomyelocele, spina bifida, anencephaly)?--no.   4.  Congenital heart defect?--no.  5.  Down syndrome?--no.   6.  Emile-Sachs ([employer], [facility] [place])?--no.   7.  Canavan's Disease?--no.   8.  Familial Dysautonomia?--no.   9.  Sickle cell disease or trait ()? --no   The patient has not been tested for sickle trait  10. Hemophilia or other blood disorders?--no. 11.  Muscular dystrophy?--no. 12.  Cystic fibrosis?--no. 13.  Nemaha's Chorea?--no. 14.  Mental retardation/autism (if yes was person tested for Fragile X)?--no. 15.  Other inherited genetic or chromosomal disorder?--no. 12.  Maternal metabolic disorder (DM, PKU, etc)?--no. 17.  Patient or FOB with a child with a birth defect not listed above?--no.  17a. Patient or FOB with a birth defect themselves?--no. 18.  Recurrent pregnancy loss, or stillbirth?--no. 19.  Any medications since LMP other than prenatal vitamins (include vitamins,  supplements, OTC meds, drugs, alcohol)?--no. 20.  Any other genetic/environmental exposure to discuss?--no. Infection History:  1. Lives with someone with TB or TB exposed?--no.   2.  Patient or partner has history of genital herpes?--no.  3.  Rash or viral illness since LMP?--no.    4.  History of STD (GC, CT, HPV, syphilis, HIV)? --HPV  5.  Other: OTHER?      OB History    Para Term  AB Living   3 1 1 0 1 1   SAB TAB Ectopic Molar Multiple Live Births   1 0 0 0 0 1      # Outcome Date GA Lbr Lasha/2nd Weight Sex Delivery Anes PTL Lv   3 Current            2 Term 08/20/10 39w0d  6 lb 14 oz (3.118 kg) F Vag-Spont EPI N SEFERINO   1 SAB 10/2009                   Past Medical History:   Diagnosis Date    Abnormal Pap smear of cervix     History of cryosurgery 09/2005    GOVIND I    Neurocardiogenic syncope      Past Surgical History:   Procedure Laterality Date    COLONOSCOPY N/A 1/19/2018    COLONOSCOPY performed by Saida Guerra MD at [facility] HX ABDOMINAL LAPAROSCOPY  10/2009    r/o ectopic    HX DILATION AND EVACUATION  10/2009    SAB    HX GYN  08/05/2013    [facility]    HX LEEP PROCEDURE  2001 + 5/2004    GOVIND II    HX WISDOM TEETH EXTRACTION       Social History     Occupational History    Not on file   Tobacco Use    Smoking status: Never Smoker    Smokeless tobacco: Never Used   Substance and Sexual Activity    Alcohol use: Yes     Alcohol/week: 1.2 oz     Types: 2 Glasses of wine per week     Comment: weekly    Drug use: No    Sexual activity: Yes     Partners: Male     Birth control/protection: None     Family History   Problem Relation Age of Onset    Hypertension Mother     Elevated Lipids Mother     Colon Polyps Mother     Hypertension Father     Elevated Lipids Father     Colon Polyps Father     No Known Problems Sister     Bleeding Prob Brother         ITP    Cancer Maternal Grandmother         colon cancer    Cancer Paternal Grandmother         colon cancer    Heart Disease Paternal Grandfather     Hypertension Paternal Grandfather     No Known Problems Sister     No Known Problems Brother     No Known Problems Daughter        No Known Allergies  Prior to Admission medications    Medication Sig Start Date End Date Taking? Authorizing Provider   PNV No12-Iron-FA-DSS-OM-3 29 mg iron-1 mg -50 mg CPKD Take  by mouth. Yes Provider, Historical   sertraline (ZOLOFT) 25 mg tablet Take 1 Tab by mouth daily. 5/17/19   Donell Seip, NP   DENTA 5000 PLUS 1.1 % crea USE AT BEDTIME.  DO NOT RINSE. 4/24/19   Provider, Historical        Review of Systems: History obtained from the patient  Constitutional: negative for weight loss, fever, night sweats  HEENT: negative for hearing loss, earache, congestion, snoring, sorethroat  CV: negative for chest pain, palpitations, edema  Resp: negative for cough, shortness of breath, wheezing  Breast: negative for breast lumps, nipple discharge, galactorrhea  GI: negative for change in bowel habits, abdominal pain, black or bloody stools  : negative for frequency, dysuria, hematuria, vaginal discharge  MSK: negative for back pain, joint pain, muscle pain  Skin: negative for itching, rash, hives  Neuro: negative for dizziness, headache, confusion, weakness  Psych: negative for anxiety, depression, change in mood  Heme/lymph: negative for bleeding, bruising, pallor    Objective:  Visit Vitals  /75   Ht 5' 6\" (1.676 m)   Wt 147 lb 6.4 oz (66.9 kg)   LMP 04/11/2019   BMI 23.79 kg/m²       Physical Exam:   PHYSICAL EXAMINATION    Constitutional  · Appearance: well-nourished, well developed, alert, in no acute distress    HENT  · Head  · Face: appears normal  · Eyes: appear normal  · Ears: normal  · Mouth: normal  · Lips: no lesions    Neck  · Inspection/Palpation: normal appearance, no masses or tenderness  · Lymph Nodes: no lymphadenopathy present  · Thyroid: gland size normal, nontender, no nodules or masses present on palpation    Chest  · Respiratory Effort: breathing unlabored  · Auscultation: normal breath sounds    Cardiovascular  · Heart:  · Auscultation: regular rate and rhythm without murmur    Breasts  · Inspection of Breasts: breasts symmetrical, no skin changes, no discharge present, nipple appearance normal, no skin retraction present  · Palpation of Breasts and Axillae: no masses present on palpation, no breast tenderness  · Axillary Lymph Nodes: no lymphadenopathy present    Gastrointestinal  · Abdominal Examination: abdomen non-tender to palpation, normal bowel sounds, no masses present  · Liver and spleen: no hepatomegaly present, spleen not palpable  · Hernias: no hernias identified    Genitourinary  · External Genitalia: normal appearance for age, no discharge present, no tenderness present, no inflammatory lesions present, no masses present, no atrophy present  · Vagina: normal vaginal vault without central or paravaginal defects, no discharge present, no inflammatory lesions present, no masses present  · Bladder: non-tender to palpation  · Urethra: appears normal  · Cervix: normal   · Uterus: enlarged, normal shape, soft  · Adnexa: no adnexal tenderness present, no adnexal masses present  · Perineum: perineum within normal limits, no evidence of trauma, no rashes or skin lesions present  · Anus: anus within normal limits, no hemorrhoids present  · Inguinal Lymph Nodes: no lymphadenopathy present    Skin  · General Inspection: no rash, no lesions identified    Neurologic/Psychiatric  · Mental Status:  · Orientation: grossly oriented to person, place and time  · Mood and Affect: mood normal, affect appropriate    Assessment:   Intrauterine pregnancy with the following problems identified:   EDC 1/25/2010 by [address] ([facility] [place])  AMA - 39 at delivery  Hx of LEEP x 2 - term delivery after  Hx of depression - Zoloft  fam hx sialidosis - per sridhar cannot test on [place]  MFM/genetic counseling    Plan:     Offered CF testing, CVS, Nuchal Translucency, MSAFP, amnio, and discussed NIPT  Course of pregnancy discussed including visit schedule, routine U/S, glucola testing, etc.  Avoid alcoholic beverages and illicit/recreational drugs use  Take prenatal vitamins or folic acid daily. Hospital and practice style discussed with coverage system. Discussed nutrition, toxoplasmosis precautions, sexual activity, exercise, need for influenza vaccine, environmental and work hazards, travel advice, screen for domestic violence, need for seat belts.   Discussed seafood, unpasteurized dairy products, deli meat, artificial sweeteners, and caffeine. Information on prenatal classes/breastfeeding given. Information on circumcision given  Patient encouraged not to smoke. Discussed current prescription drug use. Given medication list.  Discussed the use of over the counter medications and chemicals. Route of delivery discussed, including risks, benefits, and alternatives of  versus repeat LTCS. Pt understands risk of hemorrhage during pregnancy and post delivery and would accept blood products if necessary in life-threatening emergencies  Disc NIPTS/amino/CVS  Has tried zofran - still has nausea - will try Bonjesta  Check parvo    Handouts given to pt.

## 2019-06-19 NOTE — PATIENT INSTRUCTIONS

## 2019-06-20 LAB — BACTERIA UR CULT: NO GROWTH

## 2019-06-21 DIAGNOSIS — O09.529 ANTEPARTUM MULTIGRAVIDA OF ADVANCED MATERNAL AGE: ICD-10-CM

## 2019-06-21 LAB
B19V IGG SER IA-ACNC: 6.8 INDEX (ref 0–0.8)
B19V IGM SER IA-ACNC: 0.2 INDEX (ref 0–0.8)
C TRACH RRNA SPEC QL NAA+PROBE: NEGATIVE
N GONORRHOEA RRNA SPEC QL NAA+PROBE: NEGATIVE
T VAGINALIS RRNA VAG QL NAA+PROBE: NEGATIVE

## 2019-06-23 LAB
CYTOLOGIST CVX/VAG CYTO: NORMAL
CYTOLOGY CVX/VAG DOC CYTO: NORMAL
CYTOLOGY CVX/VAG DOC THIN PREP: NORMAL
DX ICD CODE: NORMAL
HPV I/H RISK 1 DNA CVX QL PROBE+SIG AMP: NEGATIVE
Lab: NORMAL
OTHER STN SPEC: NORMAL
STAT OF ADQ CVX/VAG CYTO-IMP: NORMAL

## 2019-06-25 DIAGNOSIS — O09.529 ANTEPARTUM MULTIGRAVIDA OF ADVANCED MATERNAL AGE: ICD-10-CM

## 2019-07-05 ENCOUNTER — ROUTINE PRENATAL (OUTPATIENT)
Dept: OBGYN CLINIC | Age: 39
End: 2019-07-05

## 2019-07-05 VITALS — DIASTOLIC BLOOD PRESSURE: 64 MMHG | SYSTOLIC BLOOD PRESSURE: 118 MMHG | WEIGHT: 150.8 LBS | BODY MASS INDEX: 24.34 KG/M2

## 2019-07-05 DIAGNOSIS — O09.529 SUPERVISION OF MULTIGRAVIDA OF ADVANCED MATERNAL AGE, ANTEPARTUM: Primary | ICD-10-CM

## 2019-07-05 DIAGNOSIS — Z13.79 ENCOUNTER FOR OTHER SCREENING FOR GENETIC AND CHROMOSOMAL ANOMALIES: ICD-10-CM

## 2019-07-05 DIAGNOSIS — Z3A.10 10 WEEKS GESTATION OF PREGNANCY: ICD-10-CM

## 2019-07-05 LAB — NIPT, EXTERNAL: NORMAL

## 2019-07-05 NOTE — PROGRESS NOTES
Problem List  Date Reviewed: 6/19/2019          Codes Class Noted    Antepartum multigravida of advanced maternal age ICD-8-CM: O09.529  ICD-9-CM: 659.63  6/13/2019    Overview Addendum 7/3/2019  2:36 PM by Estrella Steele LPN     Intrauterine pregnancy with the following problems identified:   [facility] 1/25/2010 by [address] ([facility] [place])  AMA - 42 at delivery  Hx of LEEP x 2 - term delivery after  Hx of depression - Zoloft  fam hx sialidosis - per sridhar cannot test on [employer]- normal  MFM/genetic counseling  A1C 5.1% 6/11/19  Dating by LMP  Parvo immune               Neurocardiogenic syncope ICD-10-CM: R55  ICD-9-CM: 780.2  Unknown

## 2019-07-05 NOTE — PATIENT INSTRUCTIONS
Weeks 10 to 14 of Your Pregnancy: Care Instructions  Your Care Instructions    By weeks 10 to 14 of your pregnancy, the placenta has formed inside your uterus. It is possible to hear your baby's heartbeat with a special ultrasound device. Your baby's eyes can and do move. The arms and legs can bend. This is a good time to think about testing for birth defects. There are two types of tests: screening and diagnostic. Screening tests show the chance that a baby has a certain birth defect. They can't tell you for sure that your baby has a problem. Diagnostic tests show if a baby has a certain birth defect. It's your choice whether to have these tests. You and your partner can talk to your doctor or midwife about birth defects tests. Follow-up care is a key part of your treatment and safety. Be sure to make and go to all appointments, and call your doctor if you are having problems. It's also a good idea to know your test results and keep a list of the medicines you take. How can you care for yourself at home? Decide about tests  · You can have screening tests and diagnostic tests to check for birth defects. The decision to have a test for birth defects is personal. Think about your age, your chance of passing on a family disease, your need to know about any problems, and what you might do after you have the test results. ? Triple or quadruple (quad) blood tests. These screening tests can be done between 15 and 20 weeks of pregnancy. They check the amounts of three or four substances in your blood. The doctor looks at these test results, along with your age and other factors, to find out the chance that your baby may have certain problems. ? Amniocentesis. This diagnostic test is used to look for chromosomal problems in the baby's cells.  It can be done between 15 and 20 weeks of pregnancy, usually around week 16.  ? Nuchal translucency test. This test uses ultrasound to measure the thickness of the area at the back of the baby's neck. An increase in the thickness can be an early sign of Down syndrome. ? Chorionic villus sampling (CVS). This is a test that looks for certain genetic problems with your baby. The same genes that are in your baby are in the placenta. A small piece of the placenta is taken out and tested. This test is done when you are 10 to 13 weeks pregnant. Ease discomfort  · Slow down and take naps when you feel tired. · If your emotions swing, talk to someone. Crying, anxiety, and concentration problems are common. · If your gums bleed, try a softer toothbrush. If your gums are puffy and bleed a lot, see your dentist.  · If you feel dizzy:  ? Get up slowly after sitting or lying down. ? Drink plenty of fluids. ? Eat small snacks to keep your blood sugar stable. ? Put your head between your legs as though you were tying your shoelaces. ? Lie down with your legs higher than your head. Use pillows to prop up your feet. · If you have a headache:  ? Lie down. ? Ask your partner or a good friend for a neck massage. ? Try cool cloths over your forehead or across the back of your neck. ? Use acetaminophen (Tylenol) for pain relief. Do not use nonsteroidal anti-inflammatory drugs (NSAIDs), such as ibuprofen (Advil, Motrin) or naproxen (Aleve), unless your doctor says it is okay. · If you have a nosebleed, pinch your nose gently, and hold it for a short while. To prevent nosebleeds, try massaging a small dab of petroleum jelly, such as Vaseline, in your nostrils. · If your nose is stuffed up, try saline (saltwater) nose sprays. Do not use decongestant sprays. Care for your breasts  · Wear a bra that gives you good support. · Know that changes in your breasts are normal.  ? Your breasts may get larger and more tender. Tenderness usually gets better by 12 weeks. ? Your nipples may get darker and larger, and small bumps around your nipples may show more. ?  The veins in your chest and breasts may show more. · Don't worry about \"toughening'\" your nipples. Breastfeeding will naturally do this. Where can you learn more? Go to http://ghassan-martina.info/. Enter L987 in the search box to learn more about \"Weeks 10 to 14 of Your Pregnancy: Care Instructions. \"  Current as of: September 5, 2018  Content Version: 11.9  © 5200-7806 [employer]. Care instructions adapted under license by [employer] (which disclaims liability or warranty for this information). If you have questions about a medical condition or this instruction, always ask your healthcare professional. [address] any warranty or liability for your use of this information.

## 2019-07-15 DIAGNOSIS — O09.529 ANTEPARTUM MULTIGRAVIDA OF ADVANCED MATERNAL AGE: ICD-10-CM

## 2019-07-19 ENCOUNTER — HOSPITAL ENCOUNTER (OUTPATIENT)
Dept: PERINATAL CARE | Age: 39
Discharge: HOME | End: 2019-07-19
Attending: OBSTETRICS & GYNECOLOGY
Payer: COMMERCIAL

## 2019-07-19 PROCEDURE — 76813 OB US NUCHAL MEAS 1 GEST: CPT | Performed by: OBSTETRICS & GYNECOLOGY

## 2019-07-19 PROCEDURE — 76801 OB US < 14 WKS SINGLE FETUS: CPT | Performed by: OBSTETRICS & GYNECOLOGY

## 2019-08-09 ENCOUNTER — ROUTINE PRENATAL (OUTPATIENT)
Dept: OBGYN CLINIC | Age: 39
End: 2019-08-09

## 2019-08-09 VITALS
HEIGHT: 66 IN | DIASTOLIC BLOOD PRESSURE: 66 MMHG | BODY MASS INDEX: 24.75 KG/M2 | WEIGHT: 154 LBS | HEART RATE: 63 BPM | SYSTOLIC BLOOD PRESSURE: 123 MMHG

## 2019-08-09 DIAGNOSIS — Z3A.37 37 WEEKS GESTATION OF PREGNANCY: ICD-10-CM

## 2019-08-09 DIAGNOSIS — O09.529 SUPERVISION OF MULTIGRAVIDA OF ADVANCED MATERNAL AGE, ANTEPARTUM: Primary | ICD-10-CM

## 2019-08-09 RX ORDER — SERTRALINE HYDROCHLORIDE 25 MG/1
TABLET, FILM COATED ORAL
Qty: 30 TAB | Refills: 2 | Status: SHIPPED | OUTPATIENT
Start: 2019-08-09 | End: 2019-11-06 | Stop reason: SDUPTHER

## 2019-08-09 RX ORDER — SERTRALINE HYDROCHLORIDE 25 MG/1
TABLET, FILM COATED ORAL
Refills: 2 | COMMUNITY
Start: 2019-07-12 | End: 2019-10-02 | Stop reason: SDUPTHER

## 2019-08-09 NOTE — PROGRESS NOTES
Doing well.   Will do AFP next week since does not want to wait at [address] in 4 weeks - does not want to come here same day because back in school

## 2019-08-09 NOTE — PATIENT INSTRUCTIONS

## 2019-08-09 NOTE — PROGRESS NOTES
Problem List  Date Reviewed: 7/5/2019          Codes Class Noted    Antepartum multigravida of advanced maternal age ICD-8-CM: O09.529  ICD-9-CM: 659.63  6/13/2019    Overview Addendum 7/15/2019 10:11 AM by Vallie Eisenmenger, April M, LPN     Intrauterine pregnancy with the following problems identified:   [facility] 1/25/2010 by [address] ([facility] [place])  AMA - 42 at delivery  Hx of LEEP x 2 - term delivery after  Hx of depression - Zoloft  fam hx sialidosis - per sridhar cannot test on [employer]- normal  MFM/genetic counseling  A1C 5.1% 6/11/19  Dating by LMP  Parvo immune  [place]--[facility] male               Neurocardiogenic syncope ICD-10-CM: R55  ICD-9-CM: 780.2  Unknown

## 2019-08-12 ENCOUNTER — LAB ONLY (OUTPATIENT)
Dept: OBGYN CLINIC | Age: 39
End: 2019-08-12

## 2019-08-12 DIAGNOSIS — O09.529 ANTEPARTUM MULTIGRAVIDA OF ADVANCED MATERNAL AGE: ICD-10-CM

## 2019-08-12 DIAGNOSIS — O09.529 SUPERVISION OF MULTIGRAVIDA OF ADVANCED MATERNAL AGE, ANTEPARTUM: Primary | ICD-10-CM

## 2019-08-12 LAB — AFP, MATERNAL, EXTERNAL: NEGATIVE

## 2019-08-14 DIAGNOSIS — O09.529 ANTEPARTUM MULTIGRAVIDA OF ADVANCED MATERNAL AGE: ICD-10-CM

## 2019-08-14 LAB
AFP ADJ MOM SERPL: 1.4
AFP INTERP SERPL-IMP: NORMAL
AFP INTERP SERPL-IMP: NORMAL
AFP SERPL-MCNC: 46.3 NG/ML
AGE AT DELIVERY: 37.7 YR
COMMENT, 018013: NORMAL
GA METHOD: NORMAL
GA: 16.3 WEEKS
IDDM PATIENT QL: NO
MULTIPLE PREGNANCY: NO
NEURAL TUBE DEFECT RISK FETUS: 3600 %
RESULTS, 017004: NORMAL

## 2019-08-22 ENCOUNTER — TELEPHONE (OUTPATIENT)
Dept: INTERNAL MEDICINE CLINIC | Age: 39
End: 2019-08-22

## 2019-08-22 ENCOUNTER — ROUTINE PRENATAL (OUTPATIENT)
Dept: OBGYN CLINIC | Age: 39
End: 2019-08-22

## 2019-08-22 ENCOUNTER — TELEPHONE (OUTPATIENT)
Dept: OBGYN CLINIC | Age: 39
End: 2019-08-22

## 2019-08-22 VITALS — SYSTOLIC BLOOD PRESSURE: 114 MMHG | BODY MASS INDEX: 25.5 KG/M2 | WEIGHT: 158 LBS | DIASTOLIC BLOOD PRESSURE: 67 MMHG

## 2019-08-22 DIAGNOSIS — R30.9 URINARY PAIN: ICD-10-CM

## 2019-08-22 DIAGNOSIS — M54.5 LOW BACK PAIN, UNSPECIFIED BACK PAIN LATERALITY, UNSPECIFIED CHRONICITY, WITH SCIATICA PRESENCE UNSPECIFIED: Primary | ICD-10-CM

## 2019-08-22 DIAGNOSIS — O26.892 LOW BACK PAIN DURING PREGNANCY IN SECOND TRIMESTER: ICD-10-CM

## 2019-08-22 DIAGNOSIS — M54.50 LOW BACK PAIN DURING PREGNANCY IN SECOND TRIMESTER: ICD-10-CM

## 2019-08-22 LAB
BILIRUB UR QL STRIP: NEGATIVE
GLUCOSE UR-MCNC: NEGATIVE MG/DL
KETONES P FAST UR STRIP-MCNC: NEGATIVE MG/DL
PH UR STRIP: 6 [PH] (ref 4.6–8)
PROT UR QL STRIP: NEGATIVE
SP GR UR STRIP: 1.01 (ref 1–1.03)
UA UROBILINOGEN AMB POC: NORMAL (ref 0.2–1)
URINALYSIS CLARITY POC: CLEAR
URINALYSIS COLOR POC: YELLOW
URINE BLOOD POC: NEGATIVE
URINE LEUKOCYTES POC: NEGATIVE
URINE NITRITES POC: NEGATIVE

## 2019-08-22 NOTE — TELEPHONE ENCOUNTER
Please let her know we will not have flu shots in the office until later Sept. Will need to reschedule. If wants to get her flu shot now will need to go to her local pharmacy.

## 2019-08-22 NOTE — PROGRESS NOTES
UTI note    Babara Collet is a ,  42 y.o. female [facility] who presents today with had concerns including Urinary Pain. Nicola Gorman Her symptoms started 1 day ago, gradually worsening since that time. Discomfort is in the groin area and low back does not radiate. Symptoms are not alleviated by hydration. Symptoms are exacerbated with activity. Patient's other complaints: back pain. Her symptoms are moderate. Patient denies fever. There is not any concern of sexual abuse. There is not a history of trauma to the genital area. Patient does not have a history of recurrent UTI. She does not have a history of pyelonephritis. No fever. Has some frequency but not a change    She has a history of  has a past medical history of Abnormal Pap smear of cervix, History of cryosurgery (2005), and Neurocardiogenic syncope. with the following surgical history  has a past surgical history that includes hx leep procedure ( + 2004); hx abdominal laparoscopy (10/2009); colonoscopy (N/A, 2018); hx dilation and evacuation (10/2009); hx wisdom teeth extraction; and hx gyn (2013). .  . She has not been evaluated for her current complaints. .     Last urinalysis results are:    Urine dipstick shows negative for all components.     Results for orders placed or performed in visit on 19   AMB POC URINALYSIS DIP STICK MANUAL W/O MICRO     Status: None   Result Value Ref Range Status    Color (UA POC) Yellow  Final    Clarity (UA POC) Clear  Final    Glucose (UA POC) Negative Negative Final    Bilirubin (UA POC) Negative Negative Final    Ketones (UA POC) Negative Negative Final    Specific gravity (UA POC)  1.001 - 1.035     Blood (UA POC) Negative Negative Final    pH (UA POC)  4.6 - 8.0     Protein (UA POC) Negative Negative Final    Urobilinogen (UA POC) normal 0.2 - 1 Final    Nitrites (UA POC) Negative Negative Final    Leukocyte esterase (UA POC) Negative Negative Final       Past Medical History:   Diagnosis Date    Abnormal Pap smear of cervix     History of cryosurgery 09/2005    GOVIND I    Neurocardiogenic syncope      Past Surgical History:   Procedure Laterality Date    COLONOSCOPY N/A 1/19/2018    COLONOSCOPY performed by Sandee Meza MD at [facility] HX ABDOMINAL LAPAROSCOPY  10/2009    r/o ectopic    HX DILATION AND EVACUATION  10/2009    [facility]    HX GYN  08/05/2013    [facility]    HX LEEP PROCEDURE  2001 + 5/2004    GOVIND II    HX WISDOM TEETH EXTRACTION       Social History     Occupational History    Not on file   Tobacco Use    Smoking status: Never Smoker    Smokeless tobacco: Never Used   Substance and Sexual Activity    Alcohol use: Yes     Alcohol/week: 2.0 standard drinks     Types: 2 Glasses of wine per week     Comment: weekly    Drug use: No    Sexual activity: Yes     Partners: Male     Birth control/protection: None     Family History   Problem Relation Age of Onset    Hypertension Mother     Elevated Lipids Mother     Colon Polyps Mother     Hypertension Father     Elevated Lipids Father     Colon Polyps Father     No Known Problems Sister     Bleeding Prob Brother         ITP    Cancer Maternal Grandmother         colon cancer    Cancer Paternal Grandmother         colon cancer    Heart Disease Paternal Grandfather     Hypertension Paternal Grandfather     No Known Problems Sister     No Known Problems Brother     No Known Problems Daughter        No Known Allergies  Prior to Admission medications    Medication Sig Start Date End Date Taking? Authorizing Provider   sertraline (ZOLOFT) 25 mg tablet TAKE 1 TABLET BY MOUTH EVERY DAY 8/9/19   Katelyn Polk NP   sertraline (ZOLOFT) 25 mg tablet TAKE 1 TABLET BY MOUTH EVERY DAY 7/12/19   Provider, Historical   PNV No12-Iron-FA-DSS-OM-3 29 mg iron-1 mg -50 mg CPKD Take  by mouth.     Provider, Historical        Review of Systems: History obtained from the patient  Constitutional: negative for weight loss, fever, night sweats  Breast: negative for breast lumps, nipple discharge, galactorrhea  GI: negative for change in bowel habits, abdominal pain, black or bloody stools  : see HPI, negative for vaginal discharge  MSK: negative for back pain, joint pain, muscle pain  Skin: negative for itching, rash, hives  Psych: negative for anxiety, depression, change in mood      Objective:  Visit Vitals  /67   Wt 158 lb (71.7 kg)   LMP 04/11/2019   BMI 25.50 kg/m²       Physical Exam:   PHYSICAL EXAMINATION    Constitutional  · Appearance: well-nourished, well developed, alert, in no acute distress    Gastrointestinal  · Abdominal Examination: abdomen non-tender to palpation, normal bowel sounds, no masses present  · Liver and spleen: no hepatomegaly present, spleen not palpable  · Hernias: no hernias identified    ·     Skin  · General Inspection: no rash, no lesions identified    Neurologic/Psychiatric  · Mental Status:  · Orientation: grossly oriented to person, place and time  · Mood and Affect: mood normal, affect appropriate    Assessment:   Low back pain and round lig pain    Plan:   Urine is negative  Keep regular visit

## 2019-08-22 NOTE — TELEPHONE ENCOUNTER
Call received at 8:57am      42year old  17w5d pregnant patient last seen in the office on 19. Patient denies vaginal bleeding. Patient calling to say the she is having lower back pain and pain in the lower part of her stomach. Patient reports she is drinking tons of water and her urine is dark in color and it sometimes has an odor and sometimes not. Patient thinks she may have a uti. Patient placed on the schedule to be seen at 2:10PM today. ( ok per GS)      Patient verbalized understanding.

## 2019-08-22 NOTE — TELEPHONE ENCOUNTER
----- Message from Clay Torre sent at 8/22/2019  7:58 AM EDT -----  Regarding: Dr. Gomez [place]: [phone]  Pt states she got a note on My Chart to make an appt for a flu shot. Pt for a has an appt today at 3:00.

## 2019-08-24 LAB — BACTERIA UR CULT: NO GROWTH

## 2019-09-05 ENCOUNTER — ROUTINE PRENATAL (OUTPATIENT)
Dept: OBGYN CLINIC | Age: 39
End: 2019-09-05

## 2019-09-05 VITALS — DIASTOLIC BLOOD PRESSURE: 58 MMHG | WEIGHT: 160 LBS | BODY MASS INDEX: 25.82 KG/M2 | SYSTOLIC BLOOD PRESSURE: 115 MMHG

## 2019-09-05 DIAGNOSIS — O09.529 SUPERVISION OF MULTIGRAVIDA OF ADVANCED MATERNAL AGE, ANTEPARTUM: Primary | ICD-10-CM

## 2019-09-05 DIAGNOSIS — Z3A.19 19 WEEKS GESTATION OF PREGNANCY: ICD-10-CM

## 2019-09-05 NOTE — PROGRESS NOTES
Problem List  Date Reviewed: 8/22/2019          Codes Class Noted    Antepartum multigravida of advanced maternal age ICD-8-CM: O09.529  ICD-9-CM: 659.63  6/13/2019    Overview Addendum 8/14/2019 11:17 AM by Jenny Stephen LPN     Intrauterine pregnancy with the following problems identified:   [facility] 1/25/2010 by [address] ([place])  AMA - 42 at delivery  Hx of LEEP x 2 - term delivery after  Hx of depression - Zoloft  fam hx sialidosis - per sridhar cannot test on [employer]- normal  MFM/genetic counseling  A1C 5.1% 6/11/19  Dating by LMP  Parvo immune  Pano--WNL male  AFP NEG               Neurocardiogenic syncope ICD-10-CM: R55  ICD-9-CM: 780.2  Unknown

## 2019-09-05 NOTE — PATIENT INSTRUCTIONS

## 2019-09-09 ENCOUNTER — HOSPITAL ENCOUNTER (OUTPATIENT)
Dept: PERINATAL CARE | Age: 39
Discharge: HOME | End: 2019-09-09
Attending: OBSTETRICS & GYNECOLOGY
Payer: COMMERCIAL

## 2019-09-09 PROCEDURE — 76811 OB US DETAILED SNGL FETUS: CPT | Performed by: OBSTETRICS & GYNECOLOGY

## 2019-10-02 ENCOUNTER — ROUTINE PRENATAL (OUTPATIENT)
Dept: OBGYN CLINIC | Age: 39
End: 2019-10-02

## 2019-10-02 VITALS
BODY MASS INDEX: 26.36 KG/M2 | WEIGHT: 164 LBS | HEIGHT: 66 IN | SYSTOLIC BLOOD PRESSURE: 110 MMHG | DIASTOLIC BLOOD PRESSURE: 65 MMHG

## 2019-10-02 DIAGNOSIS — Z3A.23 23 WEEKS GESTATION OF PREGNANCY: Primary | ICD-10-CM

## 2019-10-02 DIAGNOSIS — O09.529 ANTEPARTUM MULTIGRAVIDA OF ADVANCED MATERNAL AGE: ICD-10-CM

## 2019-10-02 NOTE — PROGRESS NOTES
Doing well.  Baby moving  glucola next visit  Had flu vaccine at [facility]

## 2019-10-02 NOTE — PATIENT INSTRUCTIONS
Weeks 22 to 26 of Your Pregnancy: Care Instructions  Your Care Instructions    As you enter your 7th month of pregnancy at week 26, your baby's lungs are growing stronger and getting ready to breathe. You may notice that your baby responds to the sound of your or your partner's voice. You may also notice that your baby does less turning and twisting and more squirming or jerking. Jerking often means that your baby has the hiccups. Hiccups are perfectly normal and are only temporary. You may want to think about attending a childbirth preparation class. This is also a good time to start thinking about whether you want to have pain medicine during labor. Most pregnant women are tested for gestational diabetes between weeks 25 and 28. Gestational diabetes occurs when your blood sugar level gets too high when you're pregnant. The test is important, because you can have gestational diabetes and not know it. But the condition can cause problems for your baby. Follow-up care is a key part of your treatment and safety. Be sure to make and go to all appointments, and call your doctor if you are having problems. It's also a good idea to know your test results and keep a list of the medicines you take. How can you care for yourself at home? Ease discomfort from your baby's kicking  · Change your position. Sometimes this will cause your baby to change position too. · Take a deep breath while you raise your arm over your head. Then breathe out while you drop your arm. Do Kegel exercises to prevent urine from leaking  · You can do Kegel exercises while you stand or sit. ? Squeeze the same muscles you would use to stop your urine. Your belly and thighs should not move. ? Hold the squeeze for 3 seconds, and then relax for 3 seconds. ? Start with 3 seconds. Then add 1 second each week until you are able to squeeze for 10 seconds. ? Repeat the exercise 10 to 15 times for each session.  Do three or more sessions each day.  Ease or reduce swelling in your feet, ankles, hands, and fingers  · If your fingers are puffy, take off your rings. · Do not eat high-salt foods, such as potato chips. · Prop up your feet on a stool or couch as much as possible. Sleep with pillows under your feet. · Do not stand for long periods of time or wear tight shoes. · Wear support stockings. Where can you learn more? Go to http://ghassan-martina.info/. Enter G264 in the search box to learn more about \"Weeks 22 to 26 of Your Pregnancy: Care Instructions. \"  Current as of: May 29, 2019  Content Version: 12.2  © 5641-8424 [employer]. Care instructions adapted under license by [employer] (which disclaims liability or warranty for this information). If you have questions about a medical condition or this instruction, always ask your healthcare professional. [address] any warranty or liability for your use of this information.

## 2019-10-02 NOTE — PROGRESS NOTES
Problem List  Date Reviewed: 9/5/2019          Codes Class Noted    Antepartum multigravida of advanced maternal age ICD-8-CM: O09.529  ICD-9-CM: 659.63  6/13/2019    Overview Addendum 8/14/2019 11:17 AM by Jossie Carter LPN     Intrauterine pregnancy with the following problems identified:   [address] 1/[birth year]/2010 by [address] ([place])  AMA - 42 at delivery  Hx of LEEP x 2 - term delivery after  Hx of depression - Zoloft  fam hx sialidosis - per sridhar cannot test on [employer]- normal  MFM/genetic counseling  A1C 5.1% 6/11/19  Dating by LMP  Parvo immune  Pano--WNL male  AFP NEG               Neurocardiogenic syncope ICD-10-CM: R55  ICD-9-CM: 780.2  Unknown

## 2019-10-02 NOTE — PROGRESS NOTES
Problem List  Date Reviewed: 9/5/2019          Codes Class Noted    Antepartum multigravida of advanced maternal age ICD-8-CM: O09.529  ICD-9-CM: 659.63  6/13/2019    Overview Addendum 10/2/2019  1:37 PM by Poonam Perkins LPN     Intrauterine pregnancy with the following problems identified:   [facility] 1/25/2010 by [address] ([place])  AMA - 42 at delivery  Hx of LEEP x 2 - term delivery after  Hx of depression - Zoloft  fam hx sialidosis - per sridhar cannot test on [employer]- normal  MFM/genetic counseling  A1C 5.1% 6/11/19  Dating by LMP  Parvo immune  Pano--WNL male  AFP NEG  Flu vaccine given at PCP 10/2/19               Neurocardiogenic syncope ICD-10-CM: R55  ICD-9-CM: 780.2  Unknown

## 2019-10-30 ENCOUNTER — ROUTINE PRENATAL (OUTPATIENT)
Dept: OBGYN CLINIC | Age: 39
End: 2019-10-30

## 2019-10-30 VITALS — DIASTOLIC BLOOD PRESSURE: 65 MMHG | SYSTOLIC BLOOD PRESSURE: 109 MMHG | BODY MASS INDEX: 27.12 KG/M2 | WEIGHT: 168 LBS

## 2019-10-30 DIAGNOSIS — Z23 ENCOUNTER FOR IMMUNIZATION: ICD-10-CM

## 2019-10-30 DIAGNOSIS — Z3A.27 27 WEEKS GESTATION OF PREGNANCY: ICD-10-CM

## 2019-10-30 DIAGNOSIS — O09.529 SUPERVISION OF MULTIGRAVIDA OF ADVANCED MATERNAL AGE, ANTEPARTUM: Primary | ICD-10-CM

## 2019-10-30 LAB
GTT, 1 HR, GLUCOLA, EXTERNAL: 89
HCT, EXTERNAL: 31.5
HGB, EXTERNAL: 10.5
PLATELET CNT,   EXTERNAL: 292

## 2019-10-30 NOTE — PROGRESS NOTES
Problem List  Date Reviewed: 10/2/2019          Codes Class Noted    Antepartum multigravida of advanced maternal age ICD-8-CM: O09.529  ICD-9-CM: 659.63  6/13/2019    Overview Addendum 10/2/2019  1:37 PM by Jared Marroquin LPN     Intrauterine pregnancy with the following problems identified:   [facility] 1/25/2010 by [address] ([place])  AMA - 42 at delivery  Hx of LEEP x 2 - term delivery after  Hx of depression - Zoloft  fam hx sialidosis - per sridhar cannot test on [employer]- normal  MFM/genetic counseling  A1C 5.1% 6/11/19  Dating by LMP  Parvo immune  Pano--WNL male  AFP NEG  Flu vaccine given at PCP 10/2/19               Neurocardiogenic syncope ICD-10-CM: R55  ICD-9-CM: 780.2  Unknown

## 2019-10-30 NOTE — PROGRESS NOTES
Tdap vaccination was given in left deltoid per MD order. Consent signed. Patient tolerated injection with no side effects or complications.   Lot [id number]  Exp 1/11/22

## 2019-10-30 NOTE — PROGRESS NOTES
Problem List  Date Reviewed: 10/2/2019          Codes Class Noted    Antepartum multigravida of advanced maternal age ICD-8-CM: O09.529  ICD-9-CM: 659.63  6/13/2019    Overview Addendum 10/2/2019  1:37 PM by Elisabeth Herrera LPN     Intrauterine pregnancy with the following problems identified:   [facility] 1/25/2010 by [address] ([place])  AMA - 42 at delivery  Hx of LEEP x 2 - term delivery after  Hx of depression - Zoloft  fam hx sialidosis - per sridhar cannot test on [employer]- normal  MFM/genetic counseling  A1C 5.1% 6/11/19  Dating by LMP  Parvo immune  Pano--WNL male  AFP NEG  Flu vaccine given at PCP 10/2/19               Neurocardiogenic syncope ICD-10-CM: R55  ICD-9-CM: 780.2  Unknown

## 2019-10-30 NOTE — PATIENT INSTRUCTIONS

## 2019-10-31 LAB
ERYTHROCYTE [DISTWIDTH] IN BLOOD BY AUTOMATED COUNT: 12.3 % (ref 12.3–15.4)
GLUCOSE 1H P 50 G GLC PO SERPL-MCNC: 89 MG/DL (ref 65–139)
HCT VFR BLD AUTO: 31.5 % (ref 34–46.6)
HGB BLD-MCNC: 10.5 G/DL (ref 11.1–15.9)
MCH RBC QN AUTO: 30 PG (ref 26.6–33)
MCHC RBC AUTO-ENTMCNC: 33.3 G/DL (ref 31.5–35.7)
MCV RBC AUTO: 90 FL (ref 79–97)
PLATELET # BLD AUTO: 292 X10E3/UL (ref 150–450)
RBC # BLD AUTO: 3.5 X10E6/UL (ref 3.77–5.28)
WBC # BLD AUTO: 10.7 X10E3/UL (ref 3.4–10.8)

## 2019-11-06 RX ORDER — SERTRALINE HYDROCHLORIDE 25 MG/1
TABLET, FILM COATED ORAL
Qty: 30 TAB | Refills: 2 | Status: SHIPPED | OUTPATIENT
Start: 2019-11-06 | End: 2020-02-03

## 2019-11-13 ENCOUNTER — ROUTINE PRENATAL (OUTPATIENT)
Dept: OBGYN CLINIC | Age: 39
End: 2019-11-13

## 2019-11-13 VITALS — WEIGHT: 173 LBS | SYSTOLIC BLOOD PRESSURE: 118 MMHG | BODY MASS INDEX: 27.92 KG/M2 | DIASTOLIC BLOOD PRESSURE: 65 MMHG

## 2019-11-13 DIAGNOSIS — O09.529 SUPERVISION OF MULTIGRAVIDA OF ADVANCED MATERNAL AGE, ANTEPARTUM: Primary | ICD-10-CM

## 2019-11-13 DIAGNOSIS — Z3A.29 29 WEEKS GESTATION OF PREGNANCY: ICD-10-CM

## 2019-11-13 NOTE — PATIENT INSTRUCTIONS

## 2019-11-13 NOTE — PROGRESS NOTES
Problem List  Date Reviewed: 10/30/2019          Codes Class Noted    Antepartum multigravida of advanced maternal age ICD-8-CM: O09.529  ICD-9-CM: 659.63  6/13/2019    Overview Addendum 11/1/2019  8:52 AM by Gibran Cee LPN     Intrauterine pregnancy with the following problems identified:   [facility] 1/25/2010 by [address] ([place])  AMA - 42 at delivery  Hx of LEEP x 2 - term delivery after  Hx of depression - Zoloft  fam hx sialidosis - per sridhar cannot test on [employer]- normal  MFM/genetic counseling  A1C 5.1% 6/11/19  Dating by LMP  Parvo immune  Pano--WNL male  AFP NEG  Flu vaccine given at PCP 10/2/19  Anemia 10.5 @ 28 weeks               Neurocardiogenic syncope ICD-10-CM: R55  ICD-9-CM: 780.2  Unknown

## 2019-11-13 NOTE — PROGRESS NOTES
Problem List  Date Reviewed: 10/30/2019          Codes Class Noted    Antepartum multigravida of advanced maternal age ICD-8-CM: O09.529  ICD-9-CM: 659.63  6/13/2019    Overview Addendum 11/1/2019  8:52 AM by Kandis Harrison LPN     Intrauterine pregnancy with the following problems identified:   [facility] 1/25/2010 by [address] ([place])  AMA - 42 at delivery  Hx of LEEP x 2 - term delivery after  Hx of depression - Zoloft  fam hx sialidosis - per sridhar cannot test on [employer]- normal  MFM/genetic counseling  A1C 5.1% 6/11/19  Dating by LMP  Parvo immune  Pano--WNL male  AFP NEG  Flu vaccine given at PCP 10/2/19  Anemia 10.5 @ 28 weeks               Neurocardiogenic syncope ICD-10-CM: R55  ICD-9-CM: 780.2  Unknown

## 2019-11-25 ENCOUNTER — ROUTINE PRENATAL (OUTPATIENT)
Dept: OBGYN CLINIC | Age: 39
End: 2019-11-25

## 2019-11-25 VITALS
HEIGHT: 66 IN | BODY MASS INDEX: 27.64 KG/M2 | DIASTOLIC BLOOD PRESSURE: 62 MMHG | SYSTOLIC BLOOD PRESSURE: 111 MMHG | WEIGHT: 172 LBS

## 2019-11-25 DIAGNOSIS — Z3A.31 31 WEEKS GESTATION OF PREGNANCY: ICD-10-CM

## 2019-11-25 DIAGNOSIS — O09.529 SUPERVISION OF MULTIGRAVIDA OF ADVANCED MATERNAL AGE, ANTEPARTUM: Primary | ICD-10-CM

## 2019-11-25 NOTE — PATIENT INSTRUCTIONS

## 2019-11-25 NOTE — PROGRESS NOTES
Problem List  Date Reviewed: 11/13/2019          Codes Class Noted    Antepartum multigravida of advanced maternal age ICD-8-CM: O09.529  ICD-9-CM: 659.63  6/13/2019    Overview Addendum 11/1/2019  8:52 AM by Sdaaf Henry LPN     Intrauterine pregnancy with the following problems identified:   [facility] 1/25/2010 by [address] ([place])  AMA - 42 at delivery  Hx of LEEP x 2 - term delivery after  Hx of depression - Zoloft  fam hx sialidosis - per sridhar cannot test on [employer]- normal  MFM/genetic counseling  A1C 5.1% 6/11/19  Dating by LMP  Parvo immune  Pano--WNL male  AFP NEG  Flu vaccine given at PCP 10/2/19  Anemia 10.5 @ 28 weeks               Neurocardiogenic syncope ICD-10-CM: R55  ICD-9-CM: 780.2  Unknown

## 2019-12-06 ENCOUNTER — OFFICE VISIT (OUTPATIENT)
Dept: INTERNAL MEDICINE CLINIC | Age: 39
End: 2019-12-06

## 2019-12-06 VITALS
HEART RATE: 78 BPM | BODY MASS INDEX: 27.97 KG/M2 | SYSTOLIC BLOOD PRESSURE: 102 MMHG | DIASTOLIC BLOOD PRESSURE: 62 MMHG | TEMPERATURE: 98 F | WEIGHT: 174 LBS | RESPIRATION RATE: 18 BRPM | HEIGHT: 66 IN | OXYGEN SATURATION: 100 %

## 2019-12-06 DIAGNOSIS — R05.9 COUGH: Primary | ICD-10-CM

## 2019-12-06 DIAGNOSIS — J30.89 SEASONAL ALLERGIC RHINITIS DUE TO FUNGAL SPORES: ICD-10-CM

## 2019-12-06 DIAGNOSIS — Z3A.33 33 WEEKS GESTATION OF PREGNANCY: ICD-10-CM

## 2019-12-06 RX ORDER — LORATADINE 10 MG/1
10 TABLET ORAL
Qty: 30 TAB | Refills: 0 | Status: ON HOLD
Start: 2019-12-06 | End: 2020-01-19

## 2019-12-06 NOTE — PATIENT INSTRUCTIONS
Cough: Care Instructions  Your Care Instructions    A cough is your body's response to something that bothers your throat or airways. Many things can cause a cough. You might cough because of a cold or the flu, bronchitis, or asthma. Smoking, postnasal drip, allergies, and stomach acid that backs up into your throat also can cause coughs. A cough is a symptom, not a disease. Most coughs stop when the cause, such as a cold, goes away. You can take a few steps at home to cough less and feel better. Follow-up care is a key part of your treatment and safety. Be sure to make and go to all appointments, and call your doctor if you are having problems. It's also a good idea to know your test results and keep a list of the medicines you take. How can you care for yourself at home? · Drink lots of water and other fluids. This helps thin the mucus and soothes a dry or sore throat. Honey or lemon juice in hot water or tea may ease a dry cough. · Take cough medicine as directed by your doctor. · Prop up your head on pillows to help you breathe and ease a dry cough. · Try cough drops to soothe a dry or sore throat. Cough drops don't stop a cough. Medicine-flavored cough drops are no better than candy-flavored drops or hard candy. · Do not smoke. Avoid secondhand smoke. If you need help quitting, talk to your doctor about stop-smoking programs and medicines. These can increase your chances of quitting for good. When should you call for help? Call 911 anytime you think you may need emergency care.  For example, call if:    · You have severe trouble breathing.    Call your doctor now or seek immediate medical care if:    · You cough up blood.     · You have new or worse trouble breathing.     · You have a new or higher fever.     · You have a new rash.    Watch closely for changes in your health, and be sure to contact your doctor if:    · You cough more deeply or more often, especially if you notice more mucus or a change in the color of your mucus.     · You have new symptoms, such as a sore throat, an earache, or sinus pain.     · You do not get better as expected. Where can you learn more? Go to http://ghassan-martina.info/. Enter D279 in the search box to learn more about \"Cough: Care Instructions. \"  Current as of: 2019  Content Version: 12.2  © 7301-3701 [employer]. Care instructions adapted under license by [employer] (which disclaims liability or warranty for this information). If you have questions about a medical condition or this instruction, always ask your healthcare professional. [address] any warranty or liability for your use of this information. Weeks 32 to 34 of Your Pregnancy: Care Instructions  Your Care Instructions    During the last few weeks of your pregnancy, you may have more aches and pains. It's important to rest when you can. Your growing baby is putting more pressure on your bladder. So you may need to urinate more often. Hemorrhoids are also common. These are painful, itchy veins in the rectal area. In the 36th week, most women have a test for group B streptococcus (GBS). GBS is a common bacteria that can live in the vagina and rectum. It can make your baby sick after birth. If you test positive, you will get antibiotics during labor. These will keep your baby from getting the bacteria. You may want to talk with your doctor about banking your baby's umbilical cord blood. This is the blood left in the cord after birth. If you want to save this blood, you must arrange it ahead of time. You can't decide at the last minute. If you haven't already had the Tdap shot during this pregnancy, talk to your doctor about getting it. It will help protect your  against pertussis infection. Follow-up care is a key part of your treatment and safety.  Be sure to make and go to all appointments, and call your doctor if you are having problems. It's also a good idea to know your test results and keep a list of the medicines you take. How can you care for yourself at home? Ease hemorrhoids  · Get more liquids, fruits, vegetables, and fiber in your diet. This will help keep your stools soft. · Avoid sitting for too long. Lie on your left side several times a day. · Clean yourself with soft, moist toilet paper. Or you can use witch hazel pads or personal hygiene pads. · If you are uncomfortable, try ice packs. Or you can sit in a warm sitz bath. Do these for 20 minutes at a time, as needed. · Use hydrocortisone cream for pain and itching. Two examples are Anusol and Preparation H Hydrocortisone. · Ask your doctor about taking an over-the-counter stool softener. Consider breastfeeding  · Experts recommend that women breastfeed for 1 year or longer. Breast milk is the perfect food for babies. · Breast milk is easier for babies to digest than formula. And it is always available, just the right temperature, and free. · Breast milk may help protect your child from some health problems.  babies are less likely than formula-fed babies to:  ? Get ear infections, colds, diarrhea, and pneumonia. ? Be obese or get diabetes later in life. · Women who breastfeed have less bleeding after the birth. Their uteruses also shrink back faster. · Some women who breastfeed lose weight faster. Making milk burns calories. · Breastfeeding can lower your risk of breast cancer, ovarian cancer, and osteoporosis. Decide about circumcision for boys  · As you make this decision, it may help to think about your personal, Temple, and family traditions. You get to decide if you will keep your son's penis natural or if he will be circumcised. · If you decide that you would like to have your baby circumcised, talk with your doctor. You can share your concerns about pain. And you can discuss your preferences for anesthesia.   Where can you learn more? Go to http://ghassan-martina.info/. Enter T714 in the search box to learn more about \"Weeks 32 to 34 of Your Pregnancy: Care Instructions. \"  Current as of: May 29, 2019  Content Version: 12.2  © 4742-1151 [employer]. Care instructions adapted under license by [employer] (which disclaims liability or warranty for this information). If you have questions about a medical condition or this instruction, always ask your healthcare professional. [address] any warranty or liability for your use of this information.

## 2019-12-08 NOTE — PROGRESS NOTES
HISTORY OF PRESENT ILLNESS  Torres Rahman is a 42 y.o. female. HPI  Presents with complaints of dry,hacking cough for the past 2 weeks; has noted slight ear tenderness and mild nasal congestion but denies purulent mucous. Has been noting increased post nasal drainage that has been especially bothersome during the night. She is 33 weeks pregnant and her due date is 1/25/2020; she is having a boy. Has been taking Robitussin OTC at night for the past 2 weeks which helps to calm cough enough that she can fall asleep. Cough has been productive in am but dry rest of the time. Denies shortness of breath, wheezing. Denies history of asthma. Denies fever, chills. Her  was seen in office earlier this week by Dr Lobo Willis for similar symptoms. Patient Active Problem List   Diagnosis Code    Neurocardiogenic syncope R55    Antepartum multigravida of advanced maternal age O12.46     Past Surgical History:   Procedure Laterality Date    COLONOSCOPY N/A 1/19/2018    COLONOSCOPY performed by Judith Terry MD at [facility] ENDOSCOPY    HX ABDOMINAL LAPAROSCOPY  10/2009    r/o ectopic    HX DILATION AND EVACUATION  10/2009    SAB    HX GYN  08/05/2013    [facility]    HX LEEP PROCEDURE  2001 + 5/2004    GOVIND II    HX WISDOM TEETH EXTRACTION       Social History     Socioeconomic History    Marital status:      Spouse name: Not on file    Number of children: Not on file    Years of education: Not on file    Highest education level: Not on file   Occupational History    Not on file   Social Needs    Financial resource strain: Not on file    Food insecurity:     Worry: Not on file     Inability: Not on file    Transportation needs:     Medical: Not on file     Non-medical: Not on file   Tobacco Use    Smoking status: Never Smoker    Smokeless tobacco: Never Used   Substance and Sexual Activity    Alcohol use:  Yes     Alcohol/week: 2.0 standard drinks     Types: 2 Glasses of wine per week     Comment: weekly  Drug use: No    Sexual activity: Yes     Partners: Male     Birth control/protection: None   Lifestyle    Physical activity:     Days per week: Not on file     Minutes per session: Not on file    Stress: Not on file   Relationships    Social connections:     Talks on phone: Not on file     Gets together: Not on file     Attends Bahai service: Not on file     Active member of club or organization: Not on file     Attends meetings of clubs or organizations: Not on file     Relationship status: Not on file    Intimate partner violence:     Fear of current or ex partner: Not on file     Emotionally abused: Not on file     Physically abused: Not on file     Forced sexual activity: Not on file   Other Topics Concern    Not on file   Social History Narrative    Not on file     Family History   Problem Relation Age of Onset    Hypertension Mother    [facility] Elevated Lipids Mother     Colon Polyps Mother     Hypertension Father    [facility] Elevated Lipids Father     Colon Polyps Father     No Known Problems Sister     Bleeding Prob Brother         ITP    Cancer Maternal Grandmother         colon cancer    Cancer Paternal Grandmother         colon cancer    Heart Disease Paternal Grandfather     Hypertension Paternal Grandfather     No Known Problems Sister     No Known Problems Brother     No Known Problems Daughter      Current Outpatient Medications   Medication Sig    loratadine (CLARITIN) 10 mg tablet Take 1 Tab by mouth daily as needed for Allergies.  FIBER CHOICE PO Take  by mouth.  iron bis-gly/FA/C/B12/Ca/succ (IRON-150 PO) Take  by mouth.  sertraline (ZOLOFT) 25 mg tablet TAKE 1 TABLET BY MOUTH EVERY DAY    PNV No12-Iron-FA-DSS-OM-3 29 mg iron-1 mg -50 mg CPKD Take  by mouth. No current facility-administered medications for this visit.       Allergies   Allergen Reactions    Cashew Nut Itching     Immunization History   Administered Date(s) Administered    Influenza Vaccine (Quad) PF 11/05/2015, 10/04/2017    MMR 08/18/2010    TB Skin Test (PPD) Intradermal 11/10/2015    Tdap 08/18/2010, 10/30/2019       Review of Systems   Constitutional: Positive for malaise/fatigue. Negative for chills and fever. HENT: Positive for congestion and ear pain. Negative for sinus pain and sore throat. Respiratory: Positive for cough. Negative for sputum production, shortness of breath and wheezing. Cardiovascular: Negative for chest pain. Gastrointestinal: Negative for nausea and vomiting. Musculoskeletal: Negative for myalgias. Skin: Negative for rash. Neurological: Positive for headaches. Negative for dizziness and tingling. /62 (BP 1 Location: Left arm, BP Patient Position: Sitting)   Pulse 78   Temp 98 °F (36.7 °C) (Oral)   Resp 18   Ht 5' 6\" (1.676 m)   Wt 174 lb (78.9 kg)   LMP 04/11/2019   SpO2 100%   BMI 28.08 kg/m²   Physical Exam  Vitals signs and nursing note reviewed. Constitutional:       Appearance: Normal appearance. She is normal weight. HENT:      Head: Normocephalic and atraumatic. Right Ear: Tympanic membrane, ear canal and external ear normal.      Left Ear: Tympanic membrane, ear canal and external ear normal.      Nose: Congestion (mild) present. Mouth/Throat:      Mouth: Mucous membranes are moist.      Pharynx: No posterior oropharyngeal erythema. Comments: Clear post nasal drainage to posterior pharynx  Cardiovascular:      Rate and Rhythm: Normal rate and regular rhythm. Pulses: Normal pulses. Heart sounds: Normal heart sounds. Pulmonary:      Effort: Pulmonary effort is normal.      Breath sounds: Normal breath sounds. No wheezing or rhonchi. Skin:     General: Skin is warm and dry. Neurological:      General: No focal deficit present. Mental Status: She is alert and oriented to person, place, and time.    Psychiatric:         Mood and Affect: Mood normal.         Behavior: Behavior normal.         ASSESSMENT and PLAN  Diagnoses and all orders for this visit:    1. Cough -- no signs of bacterial infection; suspect symptoms most likely due to allergy flare; can take Robitussin at night as needed  -     loratadine (CLARITIN) 10 mg tablet; Take 1 Tab by mouth daily as needed for Allergies. 2. Seasonal allergic rhinitis due to fungal spores  -     loratadine (CLARITIN) 10 mg tablet; Take 1 Tab by mouth daily as needed for Allergies.     3. 33 weeks gestation of pregnancy      lab results and schedule of future lab studies reviewed with patient  reviewed diet, exercise and weight control  reviewed medications and side effects in detail

## 2019-12-11 ENCOUNTER — ROUTINE PRENATAL (OUTPATIENT)
Dept: OBGYN CLINIC | Age: 39
End: 2019-12-11

## 2019-12-11 VITALS — WEIGHT: 179.4 LBS | BODY MASS INDEX: 28.96 KG/M2

## 2019-12-11 DIAGNOSIS — O36.60X0 EXCESSIVE FETAL GROWTH AFFECTING MANAGEMENT OF PREGNANCY, ANTEPARTUM, SINGLE OR UNSPECIFIED FETUS: ICD-10-CM

## 2019-12-11 DIAGNOSIS — Z3A.33 33 WEEKS GESTATION OF PREGNANCY: ICD-10-CM

## 2019-12-11 DIAGNOSIS — O09.529 SUPERVISION OF MULTIGRAVIDA OF ADVANCED MATERNAL AGE, ANTEPARTUM: Primary | ICD-10-CM

## 2019-12-11 NOTE — PROGRESS NOTES
Problem List  Date Reviewed: 12/6/2019          Codes Class Noted    Antepartum multigravida of advanced maternal age ICD-8-CM: O09.529  ICD-9-CM: 659.63  6/13/2019    Overview Addendum 11/1/2019  8:52 AM by Nay Lewis LPN     Intrauterine pregnancy with the following problems identified:   [facility] 1/25/2010 by [address] ([facility] [place])  AMA - 40 at delivery  Hx of LEEP x 2 - term delivery after  Hx of depression - Zoloft  fam hx sialidosis - per sridhar cannot test on [employer]- normal  MFM/genetic counseling  A1C 5.1% 6/11/19  Dating by LMP  Parvo immune  Pano--WNL male  AFP NEG  Flu vaccine given at PCP 10/2/19  Anemia 10.5 @ 28 weeks               Neurocardiogenic syncope ICD-10-CM: R55  ICD-9-CM: 780.2  Unknown

## 2019-12-11 NOTE — PROGRESS NOTES
US today >90%tile, nl AFV, symmetrically head and AC >90%tile  GBS in 2 weeks  Plan repeat US at 37 weeks  Normal glucola - no poly  Last baby was 6-15 at 39 weeks - same FOB

## 2019-12-24 ENCOUNTER — ROUTINE PRENATAL (OUTPATIENT)
Dept: OBGYN CLINIC | Age: 39
End: 2019-12-24

## 2019-12-24 VITALS — DIASTOLIC BLOOD PRESSURE: 61 MMHG | WEIGHT: 178 LBS | BODY MASS INDEX: 28.73 KG/M2 | SYSTOLIC BLOOD PRESSURE: 111 MMHG

## 2019-12-24 DIAGNOSIS — O09.529 ANTEPARTUM MULTIGRAVIDA OF ADVANCED MATERNAL AGE: ICD-10-CM

## 2019-12-24 DIAGNOSIS — O09.529 SUPERVISION OF MULTIGRAVIDA OF ADVANCED MATERNAL AGE, ANTEPARTUM: Primary | ICD-10-CM

## 2019-12-24 DIAGNOSIS — Z3A.35 35 WEEKS GESTATION OF PREGNANCY: ICD-10-CM

## 2019-12-24 NOTE — PROGRESS NOTES
Baby moving, doing well  GBS next visit at 36 weeks  Rotate x 2 - has fu with me in 3 weeks with another US  Baby at >90%tile last visit, normal glucola and no poly

## 2020-01-02 ENCOUNTER — ROUTINE PRENATAL (OUTPATIENT)
Dept: OBGYN CLINIC | Age: 40
End: 2020-01-02

## 2020-01-02 VITALS — DIASTOLIC BLOOD PRESSURE: 72 MMHG | BODY MASS INDEX: 28.89 KG/M2 | WEIGHT: 179 LBS | SYSTOLIC BLOOD PRESSURE: 110 MMHG

## 2020-01-02 DIAGNOSIS — Z3A.36 36 WEEKS GESTATION OF PREGNANCY: ICD-10-CM

## 2020-01-02 DIAGNOSIS — O09.523 SUPERVISION OF ELDERLY MULTIGRAVIDA IN THIRD TRIMESTER: Primary | ICD-10-CM

## 2020-01-02 LAB — GRBS, EXTERNAL: NEGATIVE

## 2020-01-02 NOTE — PATIENT INSTRUCTIONS

## 2020-01-06 LAB — B-HEM STREP SPEC QL CULT: NEGATIVE

## 2020-01-09 ENCOUNTER — ROUTINE PRENATAL (OUTPATIENT)
Dept: OBGYN CLINIC | Age: 40
End: 2020-01-09

## 2020-01-09 VITALS
DIASTOLIC BLOOD PRESSURE: 62 MMHG | HEIGHT: 66 IN | BODY MASS INDEX: 29.57 KG/M2 | SYSTOLIC BLOOD PRESSURE: 100 MMHG | WEIGHT: 184 LBS

## 2020-01-09 DIAGNOSIS — O09.529 ANTEPARTUM MULTIGRAVIDA OF ADVANCED MATERNAL AGE: ICD-10-CM

## 2020-01-09 DIAGNOSIS — Z3A.37 37 WEEKS GESTATION OF PREGNANCY: Primary | ICD-10-CM

## 2020-01-09 NOTE — PATIENT INSTRUCTIONS

## 2020-01-09 NOTE — PROGRESS NOTES
_ [facility]  [email or web address]  [phone]    Tushar Davies MD, FACOG     Follow-up OB visit    Chief Complaint   Patient presents with    Routine Prenatal Visit       Vitals:    20 1431   BP: 100/62   Weight: 184 lb (83.5 kg)   Height: 5' 6\" (1.676 m)       Patient Active Problem List    Diagnosis Date Noted    Antepartum multigravida of advanced maternal age 2019    Neurocardiogenic syncope         The patient reports the following concerns: none    Prenatal Visit    Vitals:    20 1431   BP: 100/62   Weight: 184 lb (83.5 kg)   Height: 5' 6\" (1.676 m)     See PN flowsheet for exam      42 y.o.  37w5d   Encounter Diagnoses   Name Primary?  Antepartum multigravida of advanced maternal age    [place] 42 weeks gestation of pregnancy Yes         Disc. Options for LGA and potential risks including shoulder dystocia and maternal or fetal injury  Vs elective cs  Pt desires dany  Keep fu and US next week with TH     [] SAB/bleeding precautions reviewed   [] PTL/PPROM precautions reviewed   [] Labor precautions reviewed   [] Fetal kick counts discussed   [] Labs reviewed with patient   [] Delrae Cohutta precautions reviewed   [] Consent reviewed   [] Handouts given to pt   [] Glucola handout    [] GBS/labor/Magic Hour handout   []    [] We reviewed CDC recommendations for Tdap for patient and close contacts and RBA of receiving in pregnancy, advised obtaining in third trimester   [] Reviewed healthy nutrition in pregnancy and good exercise practices   [] We disc safer medications in pregnancy and referred patient to [facility] resources   [] We reviewed CDC recommendations for flu vaccine and RBA of receiving in pregnancy   []    []    []       Follow-up and Dispositions    · Return in about 1 week (around 2020) for Follow up OB visit. No orders of the defined types were placed in this encounter.       Tushar Davies MD

## 2020-01-16 ENCOUNTER — ROUTINE PRENATAL (OUTPATIENT)
Dept: OBGYN CLINIC | Age: 40
End: 2020-01-16

## 2020-01-16 VITALS
BODY MASS INDEX: 29.73 KG/M2 | HEIGHT: 66 IN | DIASTOLIC BLOOD PRESSURE: 69 MMHG | WEIGHT: 185 LBS | SYSTOLIC BLOOD PRESSURE: 128 MMHG

## 2020-01-16 DIAGNOSIS — Z3A.38 38 WEEKS GESTATION OF PREGNANCY: ICD-10-CM

## 2020-01-16 DIAGNOSIS — O09.529 ANTEPARTUM MULTIGRAVIDA OF ADVANCED MATERNAL AGE: Primary | ICD-10-CM

## 2020-01-16 NOTE — PROGRESS NOTES
US today 4300g  Plan cook and IOL next week - waitlisted for Monday  Baby moving well  Disc shoulder dystocia, increased risk PPH

## 2020-01-16 NOTE — PROGRESS NOTES
Problem List  Date Reviewed: 1/9/2020          Codes Class Noted    Antepartum multigravida of advanced maternal age ICD-8-CM: O09.529  ICD-9-CM: 659.63  6/13/2019    Overview Addendum 1/9/2020  3:07 PM by Amairani Foreman MD     Intrauterine pregnancy with the following problems identified:   [facility] 1/25/2010 by [address] ([facility] [place])  AMA - 42 at delivery  Hx of LEEP x 2 - term delivery after  Hx of depression - Zoloft  fam hx sialidosis - per sridhar cannot test on [employer]- normal  MFM/genetic counseling  A1C 5.1% 6/11/19  Dating by LMP  Parvo immune  Pano--WNL male  AFP NEG  Flu vaccine given at PCP 10/2/19  Anemia 10.5 @ 28 weeks  EFW >90%tile AC>90%tile at 34 weeks             Neurocardiogenic syncope ICD-10-CM: R55  ICD-9-CM: 780.2  Unknown

## 2020-01-16 NOTE — PATIENT INSTRUCTIONS

## 2020-01-17 ENCOUNTER — HOSPITAL ENCOUNTER (INPATIENT)
Age: 40
LOS: 2 days | Discharge: HOME | End: 2020-01-19
Attending: OBSTETRICS & GYNECOLOGY | Admitting: OBSTETRICS & GYNECOLOGY
Payer: COMMERCIAL

## 2020-01-17 ENCOUNTER — ANESTHESIA EVENT (OUTPATIENT)
Dept: LABOR AND DELIVERY | Age: 40
End: 2020-01-17
Payer: COMMERCIAL

## 2020-01-17 ENCOUNTER — ANESTHESIA (OUTPATIENT)
Dept: LABOR AND DELIVERY | Age: 40
End: 2020-01-17
Payer: COMMERCIAL

## 2020-01-17 LAB
BASOPHILS # BLD: 0 K/UL (ref 0–0.1)
BASOPHILS NFR BLD: 0 % (ref 0–1)
DIFFERENTIAL METHOD BLD: ABNORMAL
EOSINOPHIL # BLD: 0.1 K/UL (ref 0–0.4)
EOSINOPHIL NFR BLD: 1 % (ref 0–7)
ERYTHROCYTE [DISTWIDTH] IN BLOOD BY AUTOMATED COUNT: 12.7 % (ref 11.5–14.5)
HCT VFR BLD AUTO: 34.6 % (ref 35–47)
HGB BLD-MCNC: 11.9 G/DL (ref 11.5–16)
IMM GRANULOCYTES # BLD AUTO: 0.1 K/UL (ref 0–0.04)
IMM GRANULOCYTES NFR BLD AUTO: 1 % (ref 0–0.5)
LYMPHOCYTES # BLD: 1.7 K/UL (ref 0.8–3.5)
LYMPHOCYTES NFR BLD: 15 % (ref 12–49)
MCH RBC QN AUTO: 30.5 PG (ref 26–34)
MCHC RBC AUTO-ENTMCNC: 34.4 G/DL (ref 30–36.5)
MCV RBC AUTO: 88.7 FL (ref 80–99)
MONOCYTES # BLD: 0.9 K/UL (ref 0–1)
MONOCYTES NFR BLD: 8 % (ref 5–13)
NEUTS SEG # BLD: 8.6 K/UL (ref 1.8–8)
NEUTS SEG NFR BLD: 75 % (ref 32–75)
NRBC # BLD: 0 K/UL (ref 0–0.01)
NRBC BLD-RTO: 0 PER 100 WBC
PLATELET # BLD AUTO: 269 K/UL (ref 150–400)
PMV BLD AUTO: 9 FL (ref 8.9–12.9)
RBC # BLD AUTO: 3.9 M/UL (ref 3.8–5.2)
WBC # BLD AUTO: 11.5 K/UL (ref 3.6–11)

## 2020-01-17 PROCEDURE — 74011000250 HC RX REV CODE- 250: Performed by: NURSE ANESTHETIST, CERTIFIED REGISTERED

## 2020-01-17 PROCEDURE — 77030010848 HC CATH INTUTR PRSS KOLB -B

## 2020-01-17 PROCEDURE — 74011250637 HC RX REV CODE- 250/637: Performed by: OBSTETRICS & GYNECOLOGY

## 2020-01-17 PROCEDURE — 77030019905 HC CATH URETH INTMIT MDII -A

## 2020-01-17 PROCEDURE — 74011250636 HC RX REV CODE- 250/636

## 2020-01-17 PROCEDURE — 85025 COMPLETE CBC W/AUTO DIFF WBC: CPT

## 2020-01-17 PROCEDURE — 65270000029 HC RM PRIVATE

## 2020-01-17 PROCEDURE — 74011250636 HC RX REV CODE- 250/636: Performed by: OBSTETRICS & GYNECOLOGY

## 2020-01-17 PROCEDURE — 74011250636 HC RX REV CODE- 250/636: Performed by: NURSE ANESTHETIST, CERTIFIED REGISTERED

## 2020-01-17 PROCEDURE — 77030014125 HC TY EPDRL BBMI -B: Performed by: ANESTHESIOLOGY

## 2020-01-17 PROCEDURE — 77030021125

## 2020-01-17 PROCEDURE — 99284 EMERGENCY DEPT VISIT MOD MDM: CPT

## 2020-01-17 PROCEDURE — 36415 COLL VENOUS BLD VENIPUNCTURE: CPT

## 2020-01-17 RX ORDER — IBUPROFEN 800 MG/1
800 TABLET ORAL
Qty: 60 TAB | Refills: 0 | Status: SHIPPED | OUTPATIENT
Start: 2020-01-17 | End: 2021-12-30 | Stop reason: ALTCHOICE

## 2020-01-17 RX ORDER — IBUPROFEN 800 MG/1
800 TABLET ORAL EVERY 8 HOURS
Status: DISCONTINUED | OUTPATIENT
Start: 2020-01-17 | End: 2020-01-19 | Stop reason: HOSPADM

## 2020-01-17 RX ORDER — BUPIVACAINE HYDROCHLORIDE 2.5 MG/ML
INJECTION, SOLUTION EPIDURAL; INFILTRATION; INTRACAUDAL AS NEEDED
Status: DISCONTINUED | OUTPATIENT
Start: 2020-01-17 | End: 2020-01-17 | Stop reason: HOSPADM

## 2020-01-17 RX ORDER — ONDANSETRON 2 MG/ML
INJECTION INTRAMUSCULAR; INTRAVENOUS
Status: COMPLETED
Start: 2020-01-17 | End: 2020-01-17

## 2020-01-17 RX ORDER — SODIUM CHLORIDE, SODIUM LACTATE, POTASSIUM CHLORIDE, CALCIUM CHLORIDE 600; 310; 30; 20 MG/100ML; MG/100ML; MG/100ML; MG/100ML
125 INJECTION, SOLUTION INTRAVENOUS CONTINUOUS
Status: DISCONTINUED | OUTPATIENT
Start: 2020-01-17 | End: 2020-01-17

## 2020-01-17 RX ORDER — EPHEDRINE SULFATE/0.9% NACL/PF 50 MG/5 ML
10 SYRINGE (ML) INTRAVENOUS
Status: DISCONTINUED | OUTPATIENT
Start: 2020-01-17 | End: 2020-01-17

## 2020-01-17 RX ORDER — OXYTOCIN/0.9 % SODIUM CHLORIDE 30/500 ML
0-20 PLASTIC BAG, INJECTION (ML) INTRAVENOUS
Status: DISCONTINUED | OUTPATIENT
Start: 2020-01-17 | End: 2020-01-17

## 2020-01-17 RX ORDER — FENTANYL/BUPIVACAINE/NS/PF 2-1250MCG
1-16 PREFILLED PUMP RESERVOIR EPIDURAL CONTINUOUS
Status: DISCONTINUED | OUTPATIENT
Start: 2020-01-17 | End: 2020-01-17

## 2020-01-17 RX ORDER — HYDROCODONE BITARTRATE AND ACETAMINOPHEN 5; 325 MG/1; MG/1
1 TABLET ORAL
Status: DISCONTINUED | OUTPATIENT
Start: 2020-01-17 | End: 2020-01-19 | Stop reason: HOSPADM

## 2020-01-17 RX ORDER — EPHEDRINE SULFATE/0.9% NACL/PF 50 MG/5 ML
SYRINGE (ML) INTRAVENOUS AS NEEDED
Status: DISCONTINUED | OUTPATIENT
Start: 2020-01-17 | End: 2020-01-17

## 2020-01-17 RX ORDER — SODIUM CHLORIDE 0.9 % (FLUSH) 0.9 %
5-40 SYRINGE (ML) INJECTION EVERY 8 HOURS
Status: DISCONTINUED | OUTPATIENT
Start: 2020-01-17 | End: 2020-01-17

## 2020-01-17 RX ORDER — MAG HYDROX/ALUMINUM HYD/SIMETH 200-200-20
30 SUSPENSION, ORAL (FINAL DOSE FORM) ORAL
Status: DISCONTINUED | OUTPATIENT
Start: 2020-01-17 | End: 2020-01-17

## 2020-01-17 RX ORDER — DIPHENHYDRAMINE HCL 25 MG
25 CAPSULE ORAL
Status: DISCONTINUED | OUTPATIENT
Start: 2020-01-17 | End: 2020-01-19 | Stop reason: HOSPADM

## 2020-01-17 RX ORDER — NALBUPHINE HYDROCHLORIDE 10 MG/ML
2.5 INJECTION, SOLUTION INTRAMUSCULAR; INTRAVENOUS; SUBCUTANEOUS
Status: DISCONTINUED | OUTPATIENT
Start: 2020-01-17 | End: 2020-01-17 | Stop reason: RX

## 2020-01-17 RX ORDER — LIDOCAINE HYDROCHLORIDE 10 MG/ML
20 INJECTION INFILTRATION; PERINEURAL ONCE
Status: DISCONTINUED | OUTPATIENT
Start: 2020-01-17 | End: 2020-01-17

## 2020-01-17 RX ORDER — OXYTOCIN/0.9 % SODIUM CHLORIDE 20/1000 ML
125-500 PLASTIC BAG, INJECTION (ML) INTRAVENOUS CONTINUOUS
Status: DISCONTINUED | OUTPATIENT
Start: 2020-01-17 | End: 2020-01-19 | Stop reason: HOSPADM

## 2020-01-17 RX ORDER — SERTRALINE HYDROCHLORIDE 50 MG/1
50 TABLET, FILM COATED ORAL DAILY
Status: DISCONTINUED | OUTPATIENT
Start: 2020-01-18 | End: 2020-01-18

## 2020-01-17 RX ORDER — SIMETHICONE 80 MG
80 TABLET,CHEWABLE ORAL
Status: DISCONTINUED | OUTPATIENT
Start: 2020-01-17 | End: 2020-01-19 | Stop reason: HOSPADM

## 2020-01-17 RX ORDER — NALOXONE HYDROCHLORIDE 0.4 MG/ML
0.4 INJECTION, SOLUTION INTRAMUSCULAR; INTRAVENOUS; SUBCUTANEOUS AS NEEDED
Status: DISCONTINUED | OUTPATIENT
Start: 2020-01-17 | End: 2020-01-17

## 2020-01-17 RX ORDER — EPHEDRINE SULFATE/0.9% NACL/PF 50 MG/5 ML
SYRINGE (ML) INTRAVENOUS AS NEEDED
Status: DISCONTINUED | OUTPATIENT
Start: 2020-01-17 | End: 2020-01-17 | Stop reason: HOSPADM

## 2020-01-17 RX ORDER — DOCUSATE SODIUM 100 MG/1
100 CAPSULE, LIQUID FILLED ORAL 2 TIMES DAILY
Status: DISCONTINUED | OUTPATIENT
Start: 2020-01-17 | End: 2020-01-19 | Stop reason: HOSPADM

## 2020-01-17 RX ORDER — ACETAMINOPHEN 325 MG/1
650 TABLET ORAL
Status: DISCONTINUED | OUTPATIENT
Start: 2020-01-17 | End: 2020-01-19 | Stop reason: HOSPADM

## 2020-01-17 RX ORDER — NALOXONE HYDROCHLORIDE 0.4 MG/ML
0.4 INJECTION, SOLUTION INTRAMUSCULAR; INTRAVENOUS; SUBCUTANEOUS AS NEEDED
Status: DISCONTINUED | OUTPATIENT
Start: 2020-01-17 | End: 2020-01-19 | Stop reason: HOSPADM

## 2020-01-17 RX ORDER — LIDOCAINE HYDROCHLORIDE AND EPINEPHRINE 15; 5 MG/ML; UG/ML
INJECTION, SOLUTION EPIDURAL
Status: SHIPPED | OUTPATIENT
Start: 2020-01-17 | End: 2020-01-17

## 2020-01-17 RX ORDER — SODIUM CHLORIDE 0.9 % (FLUSH) 0.9 %
5-40 SYRINGE (ML) INJECTION AS NEEDED
Status: DISCONTINUED | OUTPATIENT
Start: 2020-01-17 | End: 2020-01-17

## 2020-01-17 RX ORDER — HYDROCORTISONE ACETATE PRAMOXINE HCL 2.5; 1 G/100G; G/100G
CREAM TOPICAL AS NEEDED
Status: DISCONTINUED | OUTPATIENT
Start: 2020-01-17 | End: 2020-01-19 | Stop reason: HOSPADM

## 2020-01-17 RX ORDER — ONDANSETRON 4 MG/1
4 TABLET, ORALLY DISINTEGRATING ORAL
Status: ACTIVE | OUTPATIENT
Start: 2020-01-17 | End: 2020-01-18

## 2020-01-17 RX ORDER — SERTRALINE HYDROCHLORIDE 50 MG/1
50 TABLET, FILM COATED ORAL DAILY
Status: DISCONTINUED | OUTPATIENT
Start: 2020-01-17 | End: 2020-01-17

## 2020-01-17 RX ORDER — ZOLPIDEM TARTRATE 5 MG/1
5 TABLET ORAL
Status: DISCONTINUED | OUTPATIENT
Start: 2020-01-17 | End: 2020-01-19 | Stop reason: HOSPADM

## 2020-01-17 RX ORDER — ONDANSETRON 2 MG/ML
4 INJECTION INTRAMUSCULAR; INTRAVENOUS
Status: DISCONTINUED | OUTPATIENT
Start: 2020-01-17 | End: 2020-01-17

## 2020-01-17 RX ADMIN — Medication 20 MG: at 12:01

## 2020-01-17 RX ADMIN — ONDANSETRON 4 MG: 2 INJECTION INTRAMUSCULAR; INTRAVENOUS at 12:56

## 2020-01-17 RX ADMIN — IBUPROFEN 800 MG: 800 TABLET ORAL at 20:08

## 2020-01-17 RX ADMIN — SODIUM CHLORIDE, SODIUM LACTATE, POTASSIUM CHLORIDE, AND CALCIUM CHLORIDE 999 ML/HR: 600; 310; 30; 20 INJECTION, SOLUTION INTRAVENOUS at 10:10

## 2020-01-17 RX ADMIN — BUPIVACAINE HYDROCHLORIDE 1 ML: 2.5 INJECTION, SOLUTION EPIDURAL; INFILTRATION; INTRACAUDAL; PERINEURAL at 11:49

## 2020-01-17 RX ADMIN — SODIUM CHLORIDE, SODIUM LACTATE, POTASSIUM CHLORIDE, AND CALCIUM CHLORIDE 1000 ML: 600; 310; 30; 20 INJECTION, SOLUTION INTRAVENOUS at 11:13

## 2020-01-17 RX ADMIN — Medication 2 MILLI-UNITS/MIN: at 14:10

## 2020-01-17 RX ADMIN — OXYTOCIN 250 ML/HR: 10 INJECTION, SOLUTION INTRAMUSCULAR; INTRAVENOUS at 18:26

## 2020-01-17 RX ADMIN — Medication 12 ML/HR: at 11:52

## 2020-01-17 RX ADMIN — SODIUM CHLORIDE, SODIUM LACTATE, POTASSIUM CHLORIDE, AND CALCIUM CHLORIDE 125 ML/HR: 600; 310; 30; 20 INJECTION, SOLUTION INTRAVENOUS at 12:20

## 2020-01-17 RX ADMIN — Medication 999 MILLI-UNITS/MIN: at 17:35

## 2020-01-17 RX ADMIN — BUPIVACAINE HYDROCHLORIDE 3 ML: 2.5 INJECTION, SOLUTION EPIDURAL; INFILTRATION; INTRACAUDAL; PERINEURAL at 11:50

## 2020-01-17 RX ADMIN — LIDOCAINE HYDROCHLORIDE AND EPINEPHRINE 3 ML: 15; 5 INJECTION, SOLUTION EPIDURAL at 11:53

## 2020-01-17 NOTE — H&P
Pt is a 42 y. o.female. Jatinder@"[employer]. The patient presents with complaint of uterine contractions since 0144  The patient denies LOF, vaginal bleeding, N/V/F/C. Pt reports good fetal movement. Pregnancy complicated by LGA infant in >90 percentile 4300 grams as of 1/16/2020    Past Medical History:   Diagnosis Date    Abnormal Pap smear of cervix     History of cryosurgery 09/2005    GOVIND I    Neurocardiogenic syncope     Psychiatric problem     depression       Visit Vitals  /83   Pulse 83   Temp 98.4 °F (36.9 °C)   Resp 16   Ht 5' 6\" (1.676 m)   Wt 83.9 kg (184 lb 15.5 oz)   SpO2 99%   BMI 29.85 kg/m²       Patient Vitals for the past 4 hrs: Mode Fetal Heart Rate Variability Decelerations Accelerations RN Reviewed Strip?   01/17/20 0644 External 120 6-25 BPM None Yes Yes   01/17/20 0620 External 115 6-25 BPM None Yes Yes       Cervical Exam  Dilation (cm): 2  Eff: 50 %  Station: -2  Position: Mid  Cervical Consistency: Firm  Vaginal exam done by? : NUVIA Newberry RN    EXAM:  Cervical Exam:  2 cm dilated    Membranes:  Intact  Uterine Activity: Frequency: Every 6-8 minutes  Fetal Heart Rate: Category I tracing    Labs:  No results found for this or any previous visit (from the past 12 hour(s)).     Results for orders placed or performed in visit on 08/22/19   AMB POC URINALYSIS DIP STICK AUTO W/ MICRO     Status: None   Result Value Ref Range Status    Color (UA POC) Yellow  Final    Clarity (UA POC) Clear  Final    Glucose (UA POC) Negative Negative Final    Bilirubin (UA POC) Negative Negative Final    Ketones (UA POC) Negative Negative Final    Specific gravity (UA POC) 1.015 1.001 - 1.035 Final    Blood (UA POC) Negative Negative Final    pH (UA POC) 6 4.6 - 8.0 Final    Protein (UA POC) Negative Negative Final    Urobilinogen (UA POC) normal 0.2 - 1 Final    Nitrites (UA POC) Negative Negative Final    Leukocyte esterase (UA POC) Negative Negative Final   Results for orders placed or performed in visit on 06/19/19   AMB POC URINALYSIS DIP STICK MANUAL W/O MICRO     Status: None   Result Value Ref Range Status    Color (UA POC) Yellow  Final    Clarity (UA POC) Clear  Final    Glucose (UA POC) Negative Negative Final    Bilirubin (UA POC) Negative Negative Final    Ketones (UA POC) Negative Negative Final    Specific gravity (UA POC)  1.001 - 1.035     Blood (UA POC) Negative Negative Final    pH (UA POC)  4.6 - 8.0     Protein (UA POC) Negative Negative Final    Urobilinogen (UA POC) normal 0.2 - 1 Final    Nitrites (UA POC) Negative Negative Final    Leukocyte esterase (UA POC) Negative Negative Final           ASSESSMENT:    IUP @ 38 weeks  Contractions        PLAN:    Plan to recheck cervix.  Admit for labor onset if cervical change  GBS negative

## 2020-01-17 NOTE — DISCHARGE SUMMARY
Obstetrical Discharge Summary     Name: Rose Marie Fuentes MRN: [id number]  SSN: [id number]    YOB: 1980  Age: 42 y.o. Sex: female      Admit Date: 2020    Discharge Date: 2020     Admitting Physician: Francia Koehler MD     Attending Physician:  Sanajy Waite MD     Admission Diagnoses: Labor and delivery, indication for care [O75.9]    Discharge Diagnoses:   Information for the patient's :  Deanna Jenkins Male Hortencia Reach [[id number]]   Delivery of a   male infant via Vaginal, Spontaneous on 2020 at 5:34 PM  by Francia Koehler. Apgars were 9  and 9 . Additional Diagnoses:   Hospital Problems  Date Reviewed: 2020          Codes Class Noted POA    Labor and delivery, indication for care ICD-10-CM: O75.9  ICD-9-CM: 659.90  2020 Unknown             Lab Results   Component Value Date/Time    Rubella, External immune 2019    GrBStrep, External Negative 2020       Hospital Course: Normal hospital course following the delivery. Disposition: Home  Condition: Good    Patient Instructions:   Current Discharge Medication List      START taking these medications    Details   ibuprofen (MOTRIN) 800 mg tablet Take 1 Tab by mouth every six (6) hours as needed for Pain. Qty: 60 Tab, Refills: 0         CONTINUE these medications which have NOT CHANGED    Details   sertraline (ZOLOFT) 25 mg tablet TAKE 1 TABLET BY MOUTH EVERY DAY  Qty: 30 Tab, Refills: 2      PNV No12-Iron-FA-DSS-OM-3 29 mg iron-1 mg -50 mg CPKD Take  by mouth.      loratadine (CLARITIN) 10 mg tablet Take 1 Tab by mouth daily as needed for Allergies. Qty: 30 Tab, Refills: 0    Associated Diagnoses: Cough; Seasonal allergic rhinitis due to fungal spores      FIBER CHOICE PO Take  by mouth.      iron bis-gly/FA/C/B12/Ca/succ (IRON-150 PO) Take  by mouth. Reference my discharge instructions.     Follow-up Appointments   Procedures    FOLLOW UP VISIT Appointment in: 6 Weeks     Standing Status:   Standing Number of Occurrences:   1     Order Specific Question:   Appointment in     Answer:   6 Weeks        Signed By:  Kristina Murillo MD     January 17, 2020

## 2020-01-17 NOTE — ANESTHESIA PROCEDURE NOTES
CSE Block    Start time: 1/17/2020 11:36 AM  End time: 1/17/2020 11:54 AM  Performed by: Davie Marcus CRNA  Authorized by: Davie Marcus CRNA     Pre-Procedure  Indications: at surgeon's request, procedure for pain and primary anesthetic    preanesthetic checklist: patient identified, risks and benefits discussed, anesthesia consent, site marked, patient being monitored and timeout performed    Timeout Time: 11:38        Procedure:   Patient Position:  Seated  Prep Region:  Lumbar  Prep: Betadine and patient draped    Location:  L2-3    Epidural Needle:   Needle Type:  Tuohy  Needle Gauge:  17 G  Injection Technique:  Loss of resistance using air  Attempts:  1    Spinal Needle:   Needle Type:  Pencil-tip  Needle Gauge:  27 G    Catheter:   Catheter Type:  Flex-tip  Catheter Size:  18 G  Catheter at Skin Depth (cm):  10  Depth in Epidural Space (cm):  5  Events: no blood with aspiration, no cerebrospinal fluid with aspiration, no paresthesia, negative aspiration test and CSF confirmed    Test Dose:  Negative    Assessment:   Catheter Secured:  Tegaderm and tape  Insertion:  Uncomplicated  Patient tolerance:  Patient tolerated the procedure well with no immediate complications

## 2020-01-17 NOTE — ROUTINE PROCESS
0602: Pt arrived to unit ambulatory with complaints of contractions since 0144. Pt reports +FM and loss of mucus plug but no gush of fluid. Pt reports she was seen in office yesterday and was 1cm. 3244: SVE per this RN 2/50/-2. 
 
6332: Dr. Lalito Osborne notified of pt condition and EFM. Per MD, continue to monitor and recheck patient for progression. 8588: Dr. Lalito Osborne at bedside discussing plan of care with pt. Pt verbalized she had not slept much over night and wanted to rest rather than walk. Pt and MD agreeing on plan of care. 0710: Bedside and Verbal shift change report given to Gagan Murrell RN (oncoming nurse) by Katalina Garcia RN (offgoing nurse). Report included the following information SBAR, Kardex, Intake/Output, MAR and Recent Results.

## 2020-01-17 NOTE — H&P
History & Physical    Name: Maty Mccall MRN: [id number]  SSN: [id number]    YOB: 1980  Age: 42 y.o. Sex: female        Subjective:     Estimated Date of Delivery: 20  OB History        3    Para   1    Term   1       0    AB   1    Living   1       SAB   1    TAB   0    Ectopic   0    Molar   0    Multiple   0    Live Births   1                Ms. Mary Abdullahi is admitted with pregnancy at 38w6d for active labor. Prenatal course was complicated by advanced maternal age and LGA baby 9-9 by ultrasound yesterday. Please see prenatal records for details. Problem List  Date Reviewed: 2020          Codes Class Noted    Labor and delivery, indication for care ICD-10-CM: O75.9  ICD-9-CM: 659.90  2020        Antepartum multigravida of advanced maternal age ICD-8-CM: O09.529  ICD-9-CM: 659.63  2019    Overview Addendum 2020  4:17 PM by Harley Ordonez     Intrauterine pregnancy with the following problems identified:   [facility] 2010 by [address] ([facility] [place])  AMA - 42 at delivery  Hx of LEEP x 2 - term delivery after  Hx of depression - Zoloft  fam hx sialidosis - per sridhar cannot test on [employer]- normal  MFM/genetic counseling  A1C 5.1% 19  Dating by LMP  Parvo immune  Pano--WNL male  AFP NEG  Flu vaccine given at PCP 10/2/19  Anemia 10.5 @ 28 weeks  EFW >90%tile AC>90%tile at 34 weeks  IOL 2020. Robertson . Pt aware.              Neurocardiogenic syncope ICD-10-CM: R55  ICD-9-CM: 780.2  Unknown                Past Medical History:   Diagnosis Date    Abnormal Pap smear of cervix     History of cryosurgery 2005    GOVIND I    Neurocardiogenic syncope     Psychiatric problem     depression     Past Surgical History:   Procedure Laterality Date    COLONOSCOPY N/A 2018    [facility] performed [facility] at [facility] HX ABDOMINAL LAPAROSCOPY  10/2009    r/o ectopic    HX DILATION AND EVACUATION  10/2009    SAB    HX GYN  2013 [facility]    HX LEEP PROCEDURE  2001 + 5/2004    GOVIND II    HX OTHER SURGICAL      HX WISDOM TEETH EXTRACTION       Social History     Occupational History    Not on file   Tobacco Use    Smoking status: Never Smoker    Smokeless tobacco: Never Used   Substance and Sexual Activity    Alcohol use: Not Currently     Alcohol/week: 2.0 standard drinks     Types: 2 Glasses of wine per week     Comment: weekly    Drug use: No    Sexual activity: Yes     Partners: Male     Birth control/protection: None     Family History   Problem Relation Age of Onset    Hypertension Mother     Elevated Lipids Mother     Colon Polyps Mother     Hypertension Father     Elevated Lipids Father     Colon Polyps Father     No Known Problems Sister     Bleeding Prob Brother         ITP    Cancer Maternal Grandmother         colon cancer    Cancer Paternal Grandmother         colon cancer    Heart Disease Paternal Grandfather     Hypertension Paternal Grandfather     No Known Problems Sister     No Known Problems Brother     No Known Problems Daughter        Allergies   Allergen Reactions    Cashew Nut Itching     Prior to Admission medications    Medication Sig Start Date End Date Taking? Authorizing Provider   sertraline (ZOLOFT) 25 mg tablet TAKE 1 TABLET BY MOUTH EVERY DAY 11/6/19  Yes Germán Locks, NP   PNV No12-Iron-FA-DSS-OM-3 29 mg iron-1 mg -50 mg CPKD Take  by mouth. Yes Provider, Historical   loratadine (CLARITIN) 10 mg tablet Take 1 Tab by mouth daily as needed for Allergies. 12/6/19   Germán Locks, NP   FIBER CHOICE PO Take  by mouth. Provider, Historical   iron bis-gly/FA/C/B12/Ca/succ (IRON-150 PO) Take  by mouth. Provider, Historical        Review of Systems: A comprehensive review of systems was negative except for that written in the HPI.     Objective:     Vitals:  Vitals:    01/17/20 1502 01/17/20 1505 01/17/20 1510 01/17/20 1524   BP: 97/55      Pulse: 77      Resp:    16   Temp:    97.9 °F (36.6 °C)   SpO2:  100% 100%    Weight:       Height:            Physical Exam:  Patient without distress. Heart: Regular rate and rhythm  Lung: clear to auscultation throughout lung fields, no wheezes, no rales, no rhonchi and normal respiratory effort  Abdomen: soft, nontender  Fundus: soft and non tender  Perineum: blood absent, amniotic fluid absent  Cervical Exam: 2/50 %/-2/Mid  Membranes:  Artificial Rupture of Membranes; Amniotic Fluid: medium amount of thin meconium fluid  Fetal Heart Rate: Reactive. Baseline has had episodes of being 100-110 with accels and moderate variability    Prenatal Labs:   Lab Results   Component Value Date/Time    Rubella, External immune 06/11/2019    GrBStrep, External Negative 01/02/2020    HBsAg, External neg 06/11/2019    HIV, External neg 06/11/2019    RPR, External neg 06/11/2019    Gonorrhea, External neg 06/19/2019    Chlamydia, External neg 06/19/2019        Assessment/Plan:     Plan: Admit for labor. .  Group B Strep was negative. Pt had vagal episode after epidural placed. IUPC and FSE were placed to more accurately monitor the FHR and contractions since baseline has been low at times.      Signed By:  Kristina Murillo MD     January 17, 2020

## 2020-01-17 NOTE — L&D DELIVERY NOTE
Delivery Summary    Patient: Dona Anand MRN: [id number]  SSN: [id number]    YOB: 1980  Age: 42 y.o. Sex: female       Information for the patient's :  Pb Stallings, Male David Brito [[id number]]       Labor Events:    Labor: No    Steroids: None   Cervical Ripening Date/Time:       Cervical Ripening Type: None   Antibiotics During Labor:     Rupture Identifier: Sac 1    Rupture Date/Time: 2020 11:08 AM   Rupture Type:     Amniotic Fluid Volume: Moderate    Amniotic Fluid Description: Clear    Amniotic Fluid Odor: None    Induction: None       Induction Date/Time:        Indications for Induction:      Augmentation: AROM   Augmentation Date/Time: 202011:08 AM   Indications for Augmentation: Ineffective Contraction Pattern   Labor complications: None       Additional complications:        Delivery Events:  Indications For Episiotomy: Fetal Macrosomia   Episiotomy: Midline   Perineal Laceration(s): None   Repaired:     Periurethral Laceration Location:      Repaired:     Labial Laceration Location:     Repaired:     Sulcal Laceration Location:     Repaired:     Vaginal Laceration Location:     Repaired:     Cervical Laceration Location:     Repaired:     Repair Suture: Vicryl 3-0   Number of Repair Packets: 1   Estimated Blood Loss (ml):  ml     Delivery Date: 2020    Delivery Time: 5:34 PM  Delivery Type: Vaginal, Spontaneous  Sex:  Male    Gestational Age: 38w7d   Delivery Clinician:  Jenae Nguyen  Living Status: Living   Delivery Location: L&D            APGARS  One minute Five minutes Ten minutes   Skin color: 1   1        Heart rate: 2   2        Grimace: 2   2        Muscle tone: 2   2        Breathin   2        Totals: 9   9            Presentation: Vertex    Position: Left Occiput Anterior  Resuscitation Method:  Suctioning-bulb; Tactile Stimulation     Meconium Stained:  Thin      Cord Information: 3 Vessels  Complications: None  Cord around:    Delayed cord clamping? Yes  Cord clamped date/time:2020  5:35 PM  Disposition of Cord Blood: Discard    Blood Gases Sent?: No    Placenta:  Date/Time: 2020  5:36 PM  Removal: Expressed      Appearance: Normal;Intact      Measurements:  Birth Weight:        Birth Length:        Head Circumference:        Chest Circumference:       Abdominal Girth: Other Providers:   JAGDISH Campbell;ABUNDIO MENDOZA, Obstetrician;Primary Nurse;Primary  Nurse           Group B Strep:   Lab Results   Component Value Date/Time    GrBStrep, External Negative 2020     Information for the patient's :  Cyndi Ely, Male Kyle Gomez [[id number]]   No results found for: ABORH, PCTABR, PCTDIG, BILI, ABORHEXT, ABORH    No results for input(s): PCO2CB, PO2CB, HCO3I, SO2I, IBD, PTEMPI, SPECTI, PHICB, ISITE, IDEV, IALLEN in the last 72 hours.

## 2020-01-17 NOTE — ANESTHESIA PREPROCEDURE EVALUATION
Relevant Problems   No relevant active problems       Anesthetic History   No history of anesthetic complications            Review of Systems / Medical History  Patient summary reviewed, nursing notes reviewed and pertinent labs reviewed    Pulmonary  Within defined limits                 Neuro/Psych   Within defined limits           Cardiovascular  Within defined limits                     GI/Hepatic/Renal     GERD: well controlled           Endo/Other        Cancer     Other Findings              Physical Exam    Airway  Mallampati: II  TM Distance: 4 - 6 cm    Mouth opening: Normal     Cardiovascular    Rhythm: regular  Rate: normal         Dental  No notable dental hx       Pulmonary  Breath sounds clear to auscultation               Abdominal  Abdominal exam normal       Other Findings            Anesthetic Plan    ASA: 2  Anesthesia type: CSE      Post-op pain plan if not by surgeon: indwelling epidural catheter      Anesthetic plan and risks discussed with: Patient

## 2020-01-17 NOTE — PROGRESS NOTES
0710: Bedside and Verbal shift change report given to JARRELL Lundy RN (oncoming nurse) by RAGHU Newberry RN (offgoing nurse). Report included the following information SBAR, Kardex, Intake/Output and Accordion. 0730: Patient up to ambulate in the halls. No needs voiced. 6186: SVE per this RN remaining unchanged 50/-3.    1273: This RN calling Dr. Noelle Ashby, no answer at this time, will contact MD again later. 0935: Dr. Noelle Ashby given update on patient, MD stated she would be at the bedside shortly to see the patient. 0945: Dr. Noelle Ashby at bedside SVE per MD . Admission orders received at this time. 1108: Dr. Noelle Ashby at bedside, AROM performed by MD, clear fluid noted. Pad changed. Orders received to start pitocin if contraction pattern spaces out. 1139: EMERSON Ibrahim at bedside for epidural placement. Time out conducted at this time. 1154: Pt reports she feels hot and dizzy. EMERSON Lubin called to bedside. 1155: EMERSON Lubin at bedside, ephedrine given    1157: Dr. Noelle Ashby and Dr. Hong notified. 1158: Dr. Hong at bedside. 1255: Dr. Noelle Ashby called to bedside to evaluate EFM tracing.    1305: Dr. Noelle Ashby at bedside reviewing EFM tracing. 1315: IUPC and FSE placed by Dr. Noelle Ashby, SVE remaining unchanged at 2 cm. MD discussing plan of care with pt and her  as well as EFM tracing. Orders received to start pitocin at this time. 1320: Dr. Noelle Ashby called back to bedside. 1325: MD reviewing EFM tracing, per MD FHR baseline is between  with accelerations. Will continue to monitor and start pitocin. 1655: Pt calling out stating she is feeling pressure, SVE per this /0.     1723: SVE per this RN 10/100/+1.    1725: Dr. Noelle Ashby notified of [address].    1728: Dr. Noelle Ashby at bedside for delivery. 1730: Pt educated on how to push, pt bearing down with contractions. 1734:  viable male infant, infant dried and stimulated and placed on maternal abdomen.  MD repairing episiotomy. :  intact placenta. MD continuing to repair perineum. : Repairs complete, krystle-care provided, pads changed, ice pack applied to perineum. : Dr. Xavier Delgado made aware of moderate bleeding with last fundal check. No new orders received, MD made aware this RN will hang second bag of pitocin. : Bedside and Verbal shift change report given to JARRELL Bajwa RN (oncoming nurse) by JARRELL Buckner RN (offgoing nurse). Report included the following information SBAR, Kardex, Intake/Output, MAR, Recent Results and Med Rec Status.

## 2020-01-18 PROCEDURE — 77030021125

## 2020-01-18 PROCEDURE — 75410000003 HC RECOV DEL/VAG/CSECN EA 0.5 HR: Performed by: OBSTETRICS & GYNECOLOGY

## 2020-01-18 PROCEDURE — 65270000029 HC RM PRIVATE

## 2020-01-18 PROCEDURE — 75410000000 HC DELIVERY VAGINAL/SINGLE: Performed by: OBSTETRICS & GYNECOLOGY

## 2020-01-18 PROCEDURE — 76060000078 HC EPIDURAL ANESTHESIA: Performed by: NURSE ANESTHETIST, CERTIFIED REGISTERED

## 2020-01-18 PROCEDURE — 75410000002 HC LABOR FEE PER 1 HR: Performed by: OBSTETRICS & GYNECOLOGY

## 2020-01-18 PROCEDURE — 77010026065 HC OXYGEN MINIMUM MEDICAL AIR: Performed by: OBSTETRICS & GYNECOLOGY

## 2020-01-18 PROCEDURE — 74011250637 HC RX REV CODE- 250/637: Performed by: OBSTETRICS & GYNECOLOGY

## 2020-01-18 RX ORDER — SERTRALINE HYDROCHLORIDE 50 MG/1
25 TABLET, FILM COATED ORAL DAILY
Status: DISCONTINUED | OUTPATIENT
Start: 2020-01-19 | End: 2020-01-19

## 2020-01-18 RX ADMIN — IBUPROFEN 800 MG: 800 TABLET ORAL at 05:27

## 2020-01-18 RX ADMIN — IBUPROFEN 800 MG: 800 TABLET ORAL at 13:57

## 2020-01-18 RX ADMIN — SERTRALINE HYDROCHLORIDE 25 MG: 50 TABLET ORAL at 07:40

## 2020-01-18 RX ADMIN — IBUPROFEN 800 MG: 800 TABLET ORAL at 21:58

## 2020-01-18 RX ADMIN — DOCUSATE SODIUM 100 MG: 100 CAPSULE, LIQUID FILLED ORAL at 10:15

## 2020-01-18 NOTE — PROGRESS NOTES
Bedside and Verbal shift change report given to [facility] (oncoming nurse) by JARRELL Mcadams RN (offgoing nurse). Report included the following information SBAR and MAR.

## 2020-01-18 NOTE — LACTATION NOTE
This note was copied from a baby's chart. Discussed with mother her plan for feeding. Reviewed the benefits of exclusive breast milk feeding during the hospital stay. Informed her of the risks of using formula to supplement in the first few days of life as well as the benefits of successful breast milk feeding; referred her to the Breastfeeding booklet about this information. She acknowledges understanding of information reviewed and states that it is her plan to BF her infant. Will support her choice and offer additional information as needed. Pt will successfully establish breastfeeding by feeding in response to early feeding cues   or wake every 3h, will obtain deep latch, and will keep log of feedings/output. Taught to BF at hunger cues and or q 2-3 hrs and to offer 10-20 drops of hand expressed colostrum at any non-feeds. Breast Assessment  Left Breast: Large  Left Nipple: Everted, Intact  Right Breast: Large  Right Nipple: Everted, Intact  Breast- Feeding Assessment  Attends Breast-Feeding Classes: No  Breast-Feeding Experience: Yes(BF/blend fed x 8+ weeks)  Breast Trauma/Surgery: No  Type/Quality: Good(Mom states infant BF with vigor and easy, 11 stools since birth)  Lactation Consultant Visits  Breast-Feedings: Good   Mother/Infant Observation  Mother Observation: Breast comfortable, Alignment, Nipple round on release, Recognizes feeding cues  Infant Observation: Rhythmic suck, Latches nipple and aereolae, Opens mouth  LATCH Documentation  Latch: Grasps breast, tongue down, lips flanged, rhythmic sucking  Audible Swallowing: A few with stimulation  Type of Nipple: Everted (after stimulation)  Comfort (Breast/Nipple): Soft/non-tender  Hold (Positioning): No assist from staff, mother able to position/hold infant  LATCH Score: 9   Reviewed breastfeeding basics:  How milk is made and normal  breastfeeding behaviors discussed.   Supply and demand,  stomach size, early feeding cues, skin to skin bonding with comfortable positioning and baby led latch-on reviewed. How to identify signs of successful breastfeeding sessions reviewed; education on assymetrical latch, signs of effective latching vs shallow, in-effective latching, normal  feeding frequency and duration and expected infant output discussed. Normal course of breastfeeding discussed including the AAP's recommendation that children receive exclusive breast milk feedings for the first six months of life with breast milk feedings to continue through the first year of life and/or beyond as complimentary table foods are added. Breastfeeding Booklet and Warm line information provided with discussion. Discussed typical  weight loss and the importance of pediatrician appointment within 24-48 hours of discharge, at 2 weeks of life and normalcy of requesting pediatric weight checks as needed in between visits. Biological Nurturing breastfeeding principles taught. How Biological Nurturing (BN)  promotes optimal breastfeeding (BF) sessions discussed. Mother encouraged to seek comfortable semi-reclining breastfeeding positions. Infant placed frontally along maternal contour. Primitive innate feeding reflexes/behaviors of the  discussed. BN tips and techniques shared; assisted with comfortable breastfeeding positioning. Placed  prone on mother's chest, near breast, to facilitate 's innate breastfeeding behaviors. Placing  prone on mother's chest also puts pressure on neurophysiologic pressure points that stimulate complimentary movements in both the  and mother  to facilitate  led latching. Allowing the baby to lead the breastfeeding process empowers mother to have the needed confidence to be successful at breastfeeding. Hand Expression Education:  Mom taught how to manually hand express her colostrum.   Emphasized the importance of providing infant with valuable colostrum as infant rests skin to skin at breast.  Aware to avoid extended periods of non-feeding. Aware to offer 10-20+ drops of colostrum every 2-3 hours until infant is latching and nursing effectively. Taught the rationale behind this low tech but highly effective evidence based practice.

## 2020-01-18 NOTE — PROGRESS NOTES
0700 Received report from Diogo Perry RN. Will continue to monitor. 1620 TRANSFER - OUT REPORT:    Verbal report given to GRACE Harvey(name) on Corrina Noe  being transferred to MIU(unit) for routine progression of care       Report consisted of patients Situation, Background, Assessment and   Recommendations(SBAR). Information from the following report(s) SBAR, Kardex and MAR was reviewed with the receiving nurse. Lines:   Peripheral IV 01/17/20 Anterior;Distal;Right Forearm (Active)   Site Assessment Clean, dry, & intact 1/18/2020  7:33 AM   Phlebitis Assessment 0 1/18/2020  7:33 AM   Infiltration Assessment 0 1/18/2020  7:33 AM   Dressing Status Clean, dry, & intact 1/18/2020  7:33 AM   Dressing Type Tape;Transparent 1/18/2020  7:33 AM   Hub Color/Line Status Capped 1/18/2020  7:33 AM   Action Taken Blood drawn 1/17/2020 10:14 AM   Alcohol Cap Used Yes 1/17/2020  7:40 PM        Opportunity for questions and clarification was provided.       Patient transported with:   Registered Nurse

## 2020-01-19 VITALS
BODY MASS INDEX: 29.73 KG/M2 | HEART RATE: 80 BPM | TEMPERATURE: 97.7 F | HEIGHT: 66 IN | SYSTOLIC BLOOD PRESSURE: 126 MMHG | DIASTOLIC BLOOD PRESSURE: 64 MMHG | RESPIRATION RATE: 16 BRPM | OXYGEN SATURATION: 98 % | WEIGHT: 184.97 LBS

## 2020-01-19 PROCEDURE — 74011250637 HC RX REV CODE- 250/637: Performed by: OBSTETRICS & GYNECOLOGY

## 2020-01-19 RX ORDER — SERTRALINE HYDROCHLORIDE 25 MG/1
25 TABLET, FILM COATED ORAL DAILY
Status: DISCONTINUED | OUTPATIENT
Start: 2020-01-19 | End: 2020-01-19 | Stop reason: HOSPADM

## 2020-01-19 RX ADMIN — IBUPROFEN 800 MG: 800 TABLET ORAL at 05:04

## 2020-01-19 RX ADMIN — DOCUSATE SODIUM 100 MG: 100 CAPSULE, LIQUID FILLED ORAL at 08:42

## 2020-01-19 NOTE — DISCHARGE INSTRUCTIONS
POST DELIVERY DISCHARGE INSTRUCTIONS    Name: Quynh Camacho  YOB: 1980  Primary Diagnosis: Active Problems:    Labor and delivery, indication for care (2020)        General:     Diet/Diet Restrictions:  Eight 8-ounce glasses of fluid daily (water, juices); avoid excessive caffeine intake. Meals/snacks as desired which are high in fiber and carbohydrates and low in fat and cholesterol. Medications:   {Medication reconciliation information is now added to the patient's AVS automatically when it is printed. There is no need to use this SmartLink in discharge instructions. Highlight this text and delete it to clear this message}      Physical Activity / Restrictions / Safety:     Avoid heavy lifting, no more that 8 lbs. For 2-3 weeks; No driving while taking narcotic pain medication. Post  patients should not drive until pain free. No intercourse 4-6 weeks, no douching or tampon use. May resume exercise in 6 weeks. Discharge Instructions/Special Treatment/Home Care Needs:     Continue prenatal vitamins. Continue to use squirt bottle with warm water on your episiotomy after each bathroom use until bleeding stops. If steri-strips applied to your incision, remove in 7 days. Take stool softeners daily. Call your doctor for the following:     Fever over 101 degrees by mouth. Vaginal bleeding heavier than a normal menstrual period or lost larger than a golf ball. Red streaks or increased swelling of legs, painful red streaks on your breast.  Painful urination, or increased pain, redness or discharge with your incision. Pain Management:     Pain Management:   Take Acetaminophen (Tylenol) or Ibuprofen (Advil, Motrin), as directed for pain. Use a warm Sitz bath 3 times daily to relieve episiotomy or hemorrhoidal discomfort. Heating pad to  incision as needed. For hemorrhoidal discomfort, use Tucks and Anusol cream as needed and directed.     Follow-Up Care: Appointment with MD:   Follow-up Appointments   Procedures    FOLLOW UP VISIT Appointment in: 6 Weeks     Standing Status:   Standing     Number of Occurrences:   1     Order Specific Question:   Appointment in     Answer:   Maria Fernanda Small     Telephone number: [phone]    Signed By: Waqas Sheppard MD                                                                                                   Date: 1/17/2020 Time: 6:02 PM

## 2020-01-19 NOTE — PROGRESS NOTES
Post-Partum Day Number 2 Progress/Discharge Note    Patient doing well post-partum without significant complaint. She is voiding without difficulty, she reports normal lochia. She is ambulatiing without dizziness. Her pain is well controlled with oral pain medication. She is tolerating general diet. Breast feeding. Wants circ. Vitals:    Patient Vitals for the past 8 hrs:   BP Temp Pulse Resp   20 0822 126/64 97.7 °F (36.5 °C) 80 16     Temp (24hrs), Av.9 °F (36.6 °C), Min:97.5 °F (36.4 °C), Max:98.2 °F (36.8 °C)        Exam:  Patient without distress. Lungs:  CTA bilaterally               CV: RRR with no rubs or gallops; no murmur               Abdomen soft, fundus firm at level of umbilicus, nontender               Lower extremities are negative for cords or tenderness; no swelling. Labs: No results found for this or any previous visit (from the past 24 hour(s)). Lab Results   Component Value Date/Time    Rubella, External immune 2019    GrBStrep, External Negative 2020    HBsAg, External neg 2019    HIV, External neg 2019    RPR, External neg 2019    Gonorrhea, External neg 2019    Chlamydia, External neg 2019       Assessment and Plan:    Postpartum Day #2, S/P . Doing well.    - d/c home   - F/U 6wk postpartum check, sooner prn   - Takes Zoloft, has RX

## 2020-01-19 NOTE — ROUTINE PROCESS
0700: Bedside and Verbal shift change report given to Karo Bolaños  RN (oncoming nurse) by Nghia Viera RN (offgoing nurse). Report included the following information SBAR, Kardex, Intake/Output, MAR and Recent Results.

## 2020-01-19 NOTE — DISCHARGE SUMMARY
Obstetrical Discharge Summary     Name: Ronald Galindo MRN: [id number]  SSN: [id number]    YOB: 1980  Age: 42 y.o. Sex: female      Admit Date: 2020    Discharge Date: No discharge date for patient encounter. Admitting Physician: Ciara Nuñez MD     Attending Physician:  Gigi Duke MD     Admission Diagnoses: Labor and delivery, indication for care [O75.9]    Procedures for this admission:     Discharge Diagnoses:   Information for the patient's :  Olimpia Allen Male Yari Matute [[id number]]   Delivery of a 8 lb 5.3 oz (3.78 kg) male infant via Vaginal, Spontaneous on 2020 at 5:34 PM  by Ciara Nuñez. Apgars were 9  and 9 . Discharge Condition: good    Discharge disposition: Home Nonsteroidals    Hospital Course: Normal hospital course following the delivery. Patient Instructions:   Current Discharge Medication List      START taking these medications    Details   ibuprofen (MOTRIN) 800 mg tablet Take 1 Tab by mouth every six (6) hours as needed for Pain. Qty: 60 Tab, Refills: 0         CONTINUE these medications which have NOT CHANGED    Details   sertraline (ZOLOFT) 25 mg tablet TAKE 1 TABLET BY MOUTH EVERY DAY  Qty: 30 Tab, Refills: 2      PNV No12-Iron-FA-DSS-OM-3 29 mg iron-1 mg -50 mg CPKD Take  by mouth. FIBER CHOICE PO Take  by mouth. STOP taking these medications       iron bis-gly/FA/C/B12/Ca/succ (IRON-150 PO) Comments:   Reason for Stopping:               Reference my discharge instructions.     Follow-up Appointments   Procedures    FOLLOW UP VISIT Appointment in: 6 Weeks     Standing Status:   Standing     Number of Occurrences:   1     Order Specific Question:   Appointment in     Answer:   6 Weeks        Signed By:  Cielo Meraz MD     2020

## 2020-01-20 NOTE — ANESTHESIA POSTPROCEDURE EVALUATION
* No procedures listed *. CSE    Anesthesia Post Evaluation      Multimodal analgesia: multimodal analgesia used between 6 hours prior to anesthesia start to PACU discharge  Patient location during evaluation: PACU  Patient participation: complete - patient participated  Level of consciousness: awake and alert  Pain management: adequate  Airway patency: patent  Anesthetic complications: no  Cardiovascular status: acceptable  Respiratory status: acceptable  Hydration status: acceptable  Post anesthesia nausea and vomiting:  none      No vitals data found for the desired time range.

## 2020-02-03 RX ORDER — SERTRALINE HYDROCHLORIDE 25 MG/1
TABLET, FILM COATED ORAL
Qty: 30 TAB | Refills: 2 | Status: SHIPPED | OUTPATIENT
Start: 2020-02-03 | End: 2020-04-14

## 2020-02-28 ENCOUNTER — OFFICE VISIT (OUTPATIENT)
Dept: OBGYN CLINIC | Age: 40
End: 2020-02-28

## 2020-02-28 VITALS
SYSTOLIC BLOOD PRESSURE: 112 MMHG | HEIGHT: 66 IN | WEIGHT: 157 LBS | BODY MASS INDEX: 25.23 KG/M2 | DIASTOLIC BLOOD PRESSURE: 71 MMHG

## 2020-02-28 NOTE — PATIENT INSTRUCTIONS
Postpartum: Care Instructions  Your Care Instructions  After childbirth (postpartum period), your body goes through many changes. Some of these changes happen over several weeks. In the hours after delivery, your body will begin to recover from childbirth while it prepares to breastfeed your . You may feel emotional during this time. Your hormones can shift your mood without warning for no clear reason. In the first couple of weeks after childbirth, many women have emotions that change from happy to sad. You may find it hard to sleep. You may cry a lot. This is called the \"baby blues. \" These overwhelming emotions often go away within a couple of days or weeks. But it's important to discuss your feelings with your doctor. It is easy to get too tired and overwhelmed during the first weeks after childbirth. Don't try to do too much. Get rest whenever you can, accept help from others, and eat well and drink plenty of fluids. In the first couple of weeks after giving birth, your doctor or midwife may want to check in with you and make a plan for any follow-up care you may need. You will likely have a complete postpartum visit in the first 3 months after delivery. At that time, your doctor or midwife will check on your recovery from childbirth. He or she will also see how you are doing with your emotions and talk about your concerns or questions. Follow-up care is a key part of your treatment and safety. Be sure to make and go to all appointments, and call your doctor if you are having problems. It's also a good idea to know your test results and keep a list of the medicines you take. How can you care for yourself at home? · Sleep or rest when your baby sleeps. · Get help with household chores from family or friends, if you can. Do not try to do it all yourself. · If you have hemorrhoids or swelling or pain around the opening of your vagina, try using cold and heat.  You can put ice or a cold pack on the area for 10 to 20 minutes at a time. Put a thin cloth between the ice and your skin. Also try sitting in a few inches of warm water (sitz bath) 3 times a day and after bowel movements. · Take pain medicines exactly as directed. ? If the doctor gave you a prescription medicine for pain, take it as prescribed. ? If you are not taking a prescription pain medicine, ask your doctor if you can take an over-the-counter medicine. · Eat more fiber to avoid constipation. Include foods such as whole-grain breads and cereals, raw vegetables, raw and dried fruits, and beans. · Drink plenty of fluids, enough so that your urine is light yellow or clear like water. If you have kidney, heart, or liver disease and have to limit fluids, talk with your doctor before you increase the amount of fluids you drink. · Do not rinse inside your vagina with fluids (douche). · If you have stitches, keep the area clean by pouring or spraying warm water over the area outside your vagina and anus after you use the toilet. · Keep a list of questions to ask your doctor or midwife. Your questions might be about:  ? Changes in your breasts, such as lumps or soreness. ? When to expect your menstrual period to start again. ? What form of birth control is best for you. ? Weight you have put on during the pregnancy. ? Exercise options. ? What foods and drinks are best for you, especially if you are breastfeeding. ? Problems you might be having with breastfeeding. ? When you can have sex. Some women may want to talk about lubricants for the vagina. ? Any feelings of sadness or restlessness that you are having. When should you call for help? Call 911 anytime you think you may need emergency care.  For example, call if:    · You have thoughts of harming yourself, your baby, or another person.     · You passed out (lost consciousness).     · You have chest pain, are short of breath, or cough up blood.     · You have a seizure.    Call your doctor now or seek immediate medical care if:    · Your vaginal bleeding seems to be getting heavier.     · You are dizzy or lightheaded, or you feel like you may faint.     · You have a fever.     · You have new or more belly pain.     · You have symptoms of a blood clot in your leg (called a deep vein thrombosis), such as:  ? Pain in the calf, back of the knee, thigh, or groin. ? Redness and swelling in your leg or groin.     · You have signs of preeclampsia, such as:  ? Sudden swelling of your face, hands, or feet. ? New vision problems (such as dimness, blurring, or seeing spots). ? A severe headache.    Watch closely for changes in your health, and be sure to contact your doctor if:    · You have new or worse vaginal discharge.     · You feel sad or depressed.     · You are having problems with your breasts or breastfeeding. Where can you learn more? Go to http://ghassan-martina.info/. Enter F679 in the search box to learn more about \"Postpartum: Care Instructions. \"  Current as of: May 29, 2019  Content Version: 12.2  © 2088-8351 [employer]. Care instructions adapted under license by [employer] (which disclaims liability or warranty for this information). If you have questions about a medical condition or this instruction, always ask your healthcare professional. [address] any warranty or liability for your use of this information.

## 2020-02-28 NOTE — PROGRESS NOTES
Postpartum evaluation    Quynh Camacho is a 42 y.o. female who presents for a postpartum exam.     She is now six weeks post normal spontaneous vaginal delivery. Her baby is doing well. She has had no menses since delivery. She has had the following significant problems since her delivery: none    The patient is breast feeding without difficulty. The patient would like to discuss birth control options. She is currently taking: no medications. She is due for her next AE in 4 months.      Visit Vitals  /71 (BP 1 Location: Right arm)   Ht 5' 6\" (1.676 m)   Wt 157 lb (71.2 kg)   LMP 04/11/2019   BMI 25.34 kg/m²       PHYSICAL EXAMINATION    Constitutional  · Appearance: well-nourished, well developed, alert, in no acute distress    HENT  · Head and Face: appears normal    Neck  · Inspection/Palpation: normal appearance, no masses or tenderness  · Lymph Nodes: no lymphadenopathy present  · Thyroid: gland size normal, nontender, no nodules or masses present on palpation    Breasts  · Inspection of Breasts: breasts symmetrical, no skin changes, no discharge present, nipple appearance normal, no skin retraction present  · Palpation of Breasts and Axillae: no masses present on palpation, no breast tenderness  · Axillary Lymph Nodes: no lymphadenopathy present    Gastrointestinal  · Abdominal Examination: abdomen non-tender to palpation, normal bowel sounds, no masses present  · Liver and spleen: no hepatomegaly present, spleen not palpable  · Hernias: no hernias identified    Genitourinary  · External Genitalia: normal appearance for age, no discharge present, no tenderness present, no inflammatory lesions present, no masses present, no atrophy present  · Vagina: normal vaginal vault without central or paravaginal defects, no discharge present, no inflammatory lesions present, no masses present  · Bladder: non-tender to palpation  · Urethra: appears normal  · Cervix: normal   · Uterus: normal size, shape and consistency  · Adnexa: no adnexal tenderness present, no adnexal masses present  · Perineum: perineum within normal limits, no evidence of trauma, no rashes or skin lesions present  · Anus: anus within normal limits, no hemorrhoids present  · Inguinal Lymph Nodes: no lymphadenopathy present    Skin  · General Inspection: no rash, no lesions identified    Neurologic/Psychiatric  · Mental Status:  · Orientation: grossly oriented to person, place and time  · Mood and Affect: mood normal, affect appropriate    Assessment:  Normal postpartum check    Plan:  RTO for AE.

## 2020-04-14 RX ORDER — SERTRALINE HYDROCHLORIDE 25 MG/1
TABLET, FILM COATED ORAL
Qty: 30 TAB | Refills: 2 | Status: SHIPPED | OUTPATIENT
Start: 2020-04-14 | End: 2020-08-04

## 2020-05-20 ENCOUNTER — HOSPITAL ENCOUNTER (EMERGENCY)
Age: 40
Discharge: HOME | End: 2020-05-20
Attending: EMERGENCY MEDICINE | Admitting: EMERGENCY MEDICINE
Payer: COMMERCIAL

## 2020-05-20 ENCOUNTER — VIRTUAL VISIT (OUTPATIENT)
Dept: INTERNAL MEDICINE CLINIC | Age: 40
End: 2020-05-20

## 2020-05-20 VITALS
HEIGHT: 66 IN | BODY MASS INDEX: 24.59 KG/M2 | TEMPERATURE: 98 F | RESPIRATION RATE: 14 BRPM | SYSTOLIC BLOOD PRESSURE: 110 MMHG | HEART RATE: 55 BPM | OXYGEN SATURATION: 97 % | DIASTOLIC BLOOD PRESSURE: 62 MMHG | WEIGHT: 153 LBS

## 2020-05-20 VITALS
DIASTOLIC BLOOD PRESSURE: 69 MMHG | BODY MASS INDEX: 24.69 KG/M2 | HEART RATE: 49 BPM | SYSTOLIC BLOOD PRESSURE: 112 MMHG | WEIGHT: 153 LBS

## 2020-05-20 DIAGNOSIS — G43.009 MIGRAINE WITHOUT AURA AND WITHOUT STATUS MIGRAINOSUS, NOT INTRACTABLE: Primary | ICD-10-CM

## 2020-05-20 DIAGNOSIS — G44.52 HEADACHE, NEW DAILY PERSISTENT (NDPH): Primary | ICD-10-CM

## 2020-05-20 PROCEDURE — 96375 TX/PRO/DX INJ NEW DRUG ADDON: CPT

## 2020-05-20 PROCEDURE — 96374 THER/PROPH/DIAG INJ IV PUSH: CPT

## 2020-05-20 PROCEDURE — 74011250636 HC RX REV CODE- 250/636: Performed by: EMERGENCY MEDICINE

## 2020-05-20 PROCEDURE — 99284 EMERGENCY DEPT VISIT MOD MDM: CPT

## 2020-05-20 RX ORDER — DIPHENHYDRAMINE HYDROCHLORIDE 50 MG/ML
25 INJECTION, SOLUTION INTRAMUSCULAR; INTRAVENOUS
Status: COMPLETED | OUTPATIENT
Start: 2020-05-20 | End: 2020-05-20

## 2020-05-20 RX ORDER — KETOROLAC TROMETHAMINE 30 MG/ML
30 INJECTION, SOLUTION INTRAMUSCULAR; INTRAVENOUS
Status: COMPLETED | OUTPATIENT
Start: 2020-05-20 | End: 2020-05-20

## 2020-05-20 RX ORDER — PROCHLORPERAZINE EDISYLATE 5 MG/ML
10 INJECTION INTRAMUSCULAR; INTRAVENOUS
Status: COMPLETED | OUTPATIENT
Start: 2020-05-20 | End: 2020-05-20

## 2020-05-20 RX ORDER — DEXAMETHASONE SODIUM PHOSPHATE 4 MG/ML
10 INJECTION, SOLUTION INTRA-ARTICULAR; INTRALESIONAL; INTRAMUSCULAR; INTRAVENOUS; SOFT TISSUE
Status: COMPLETED | OUTPATIENT
Start: 2020-05-20 | End: 2020-05-20

## 2020-05-20 RX ADMIN — DEXAMETHASONE SODIUM PHOSPHATE 10 MG: 4 INJECTION, SOLUTION INTRAMUSCULAR; INTRAVENOUS at 11:24

## 2020-05-20 RX ADMIN — SODIUM CHLORIDE 500 ML: 900 INJECTION, SOLUTION INTRAVENOUS at 10:06

## 2020-05-20 RX ADMIN — DIPHENHYDRAMINE HYDROCHLORIDE 25 MG: 50 INJECTION, SOLUTION INTRAMUSCULAR; INTRAVENOUS at 10:09

## 2020-05-20 RX ADMIN — PROCHLORPERAZINE EDISYLATE 10 MG: 5 INJECTION INTRAMUSCULAR; INTRAVENOUS at 10:14

## 2020-05-20 RX ADMIN — KETOROLAC TROMETHAMINE 30 MG: 30 INJECTION, SOLUTION INTRAMUSCULAR at 10:11

## 2020-05-20 NOTE — PROGRESS NOTES
Consent: Jaswant Brown, who was seen by synchronous (real-time) audio-video technology, and/or her healthcare decision maker, is aware that this patient-initiated, Telehealth encounter on 5/20/2020 is a billable service, with coverage as determined by her insurance carrier. She is aware that she may receive a bill and has provided verbal consent to proceed: YES  712  Subjective:   Jaswant Brown is a 47 y.o. female who was seen for Neck Pain and Head Pain      She has a 4 mo son alem and is generally in good health-notes 5 days ago  Started with neck pain and 3 days ago developed ha on left side of head with  Nausea and photosensitivity-no fevers no vomiting. Rates ha as 9/10 -using advil 800 mg and heat no relief. Is nursing. Some migraine history but usually not for this long    Current Outpatient Medications   Medication Sig    sertraline (ZOLOFT) 25 mg tablet TAKE 1 TABLET BY MOUTH EVERY DAY    ibuprofen (MOTRIN) 800 mg tablet Take 1 Tab by mouth every six (6) hours as needed for Pain.  PNV No12-Iron-FA-DSS-OM-3 29 mg iron-1 mg -50 mg CPKD Take  by mouth. No current facility-administered medications for this visit.         Allergies   Allergen Reactions    Cashew Nut Itching     Pt states she is only allergic to cashew nuts no other kind       Past Medical History:   Diagnosis Date    Abnormal Pap smear of cervix     History of cryosurgery 09/2005    GOVIND I    Neurocardiogenic syncope     Psychiatric problem     depression       ROS  All other systems reviewed and negative, unless mentioned in HPI    Objective:   Vital Signs: (As obtained by patient/caregiver at home)  Visit Vitals  /69 (BP 1 Location: Left arm, BP Patient Position: Sitting)   Pulse (!) 49   Wt 153 lb (69.4 kg)   LMP  (LMP Unknown)   Breastfeeding Yes   BMI 24.69 kg/m²        [INSTRUCTIONS:  \"[x]\" Indicates a positive item  \"[]\" Indicates a negative item  -- DELETE ALL ITEMS NOT EXAMINED]    Constitutional: [x] Appears well-developed and well-nourished [x] No apparent distress      [] Abnormal -     Mental status: [x] Alert and awake  [x] Oriented to person/place/time [x] Able to follow commands    [] Abnormal -     Eyes:   EOM    [x]  Normal    [] Abnormal -   Sclera  [x]  Normal    [] Abnormal -          Discharge [x]  None visible   [] Abnormal -     HENT: [x] Normocephalic, atraumatic  [] Abnormal -       External Ears [x] Normal  [] Abnormal -    Neck: [x] No visualized mass [] Abnormal -     Pulmonary/Chest: [x] Respiratory effort normal   [x] No visualized signs of difficulty breathing or respiratory distress        [] Abnormal -      Musculoskeletal:            [x] Normal range of motion of neck        [] Abnormal -     Neurological:        [x] No Facial Asymmetry (Cranial nerve 7 motor function) (limited exam due to video visit)          [x] No gaze palsy        [] Abnormal -          Skin:        [x] No significant exanthematous lesions or discoloration noted on facial skin         [] Abnormal -            Psychiatric:       [x] Normal Affect [] Abnormal -           Other pertinent observable physical exam findings:-        Assessment & Plan:   Diagnoses and all orders for this visit:    1. Headache, new daily persistent ([facility])    I am concerned about photosensitivity and nausea and persistence of ha-advised er eval for ct and consider iv meds to break the persistent ha if eval negative-she agrees and will go today        We discussed the expected course, resolution and complications of the diagnosis(es) in detail. Medication risks, benefits, costs, interactions, and alternatives were discussed as indicated. I advised her to contact the office if her condition worsens, changes or fails to improve as anticipated. She expressed understanding with the diagnosis(es) and plan. Sabrina Narvaez is a 47 y.o. female being evaluated by a video visit encounter for concerns as above. A caregiver was present when appropriate. Due to this being a TeleHealth encounter ([facility]), evaluation of the following organ systems was limited: Vitals/Constitutional/EENT/Resp/CV/GI//MS/Neuro/Skin/Heme-Lymph-Imm. Pursuant to the emergency declaration under the [address] [place] [address], [facility] waiver authority and the [employer] Act, this Virtual  Visit was conducted, with patient's (and/or legal guardian's) consent, to reduce the patient's risk of exposure to COVID-19 and provide necessary medical care. Services were provided through a video synchronous discussion virtually to substitute for in-person clinic visit. Patient and provider were located at their individual homes.

## 2020-05-20 NOTE — ED NOTES
The patient was discharged home by Dr Angeles Owen in stable condition. The patient is alert and oriented, in no respiratory distress and discharge vital signs obtained. The patient's diagnosis, condition and treatment were explained. The patient expressed understanding. A discharge plan has been developed. A  was not involved in the process. Aftercare instructions were given.  Pt discharged from the ED via w/c by   RN to the family car

## 2020-05-20 NOTE — ED NOTES
Patient ambulated to bathroom with a steady gait to the bathroom to void. Completed her IVF's and tolerated IV medications.

## 2020-05-20 NOTE — ED NOTES
Patient is currently eating crackers and peanut butter and drinking an orange juice, per request. Dr Melva Thornton aware that patient did not want to get the Compazine yet because the Benadryl made her feel \"so weird\"

## 2020-05-20 NOTE — ED TRIAGE NOTES
Pt here with 3 day history of headache that starts in left neck and radiates up left side of head to front. Associated with nausea and photophobia. Pt has history of headaches that are relieved with OTC. No relief with ibuprofen.   Pt is 4 months post-partum and breastfeeding

## 2020-05-20 NOTE — ED NOTES
Patient called a friend to pick her up. \"When will this headache completely go away? \" Reassured her that she needs to give the medicine a little longer to work and to follow the DC instructions when given to her.

## 2020-05-20 NOTE — ED PROVIDER NOTES
22-year-old female with a history of concussion 4 years prior with very occasional migraine headaches that typically resolve with NSAIDs and going to sleep presents to the emergency department noting a persistent left-sided headache that radiates up from the left side of her neck to the left frontal area x3 days. She states that initially she felt like she had a crick in her neck in the left side that was then gradually precipitated worsening headache. She states that her neck pain has been gradually improving. She 800 mg ibuprofen, last dose of which was at 3 AM last night which seems to alleviate her neck soreness but has not alleviated her headache. On arrival she notes 9/10 headache pain with associated photophobia and nausea but no vomiting. She denies any vision changes, numbness, weakness, head trauma, facial droop, or difficulty walking. She does not currently follow with neurology and has never had a migraine cocktail. She notes that her resting heart rate is typically bradycardic.   She is 4 months postpartum and breast-feeding           Past Medical History:   Diagnosis Date    Abnormal Pap smear of cervix     History of cryosurgery 09/2005    GOVIND I    Neurocardiogenic syncope     Psychiatric problem     depression       Past Surgical History:   Procedure Laterality Date    COLONOSCOPY N/A 1/19/2018    COLONOSCOPY performed by Karly Nazario MD at [facility] HX ABDOMINAL LAPAROSCOPY  10/2009    r/o ectopic    HX DILATION AND EVACUATION  10/2009    [facility]    HX GYN  08/05/2013    [facility]    HX LEEP PROCEDURE  2001 + 5/2004    GOVIND II    HX OTHER SURGICAL      HX WISDOM TEETH EXTRACTION           Family History:   Problem Relation Age of Onset    Hypertension Mother     Elevated Lipids Mother     Colon Polyps Mother     Hypertension Father    [facility] Elevated Lipids Father     Colon Polyps Father     No Known Problems Sister     Bleeding Prob Brother         ITP    Cancer Maternal Grandmother colon cancer    Cancer Paternal Grandmother         colon cancer    Heart Disease Paternal Grandfather     Hypertension Paternal Grandfather     No Known Problems Sister     No Known Problems Brother     No Known Problems Daughter        Social History     Socioeconomic History    Marital status:      Spouse name: Not on file    Number of children: Not on file    Years of education: Not on file    Highest education level: Not on file   Occupational History    Not on file   Social Needs    Financial resource strain: Not on file    Food insecurity     Worry: Not on file     Inability: Not on file   [employer] needs     Medical: Not on file     Non-medical: Not on file   Tobacco Use    Smoking status: Never Smoker    Smokeless tobacco: Never Used   Substance and Sexual Activity    Alcohol use: Not Currently     Alcohol/week: 2.0 standard drinks     Types: 2 Glasses of wine per week     Comment: weekly    Drug use: No    Sexual activity: Yes     Partners: Male     Birth control/protection: None   Lifestyle    Physical activity     Days per week: Not on file     Minutes per session: Not on file    Stress: Not on file   Relationships    Social connections     Talks on phone: Not on file     Gets together: Not on file     Attends Confucianist service: Not on file     Active member of club or organization: Not on file     Attends meetings of clubs or organizations: Not on file     Relationship status: Not on file    Intimate partner violence     Fear of current or ex partner: Not on file     Emotionally abused: Not on file     Physically abused: Not on file     Forced sexual activity: Not on file   Other Topics Concern     Service Not Asked    Blood Transfusions Not Asked    Caffeine Concern Not Asked    Occupational Exposure Not Asked   [place] Hazards Not Asked    Sleep Concern Not Asked    Stress Concern Not Asked    Weight Concern Not Asked    Special Diet Not Asked  Back Care Not Asked    Exercise Not Asked    Bike Helmet Not Asked    Seat Belt Not Asked    Self-Exams Not Asked   Social History Narrative    Not on file         ALLERGIES: Cashew nut    Review of Systems   Constitutional: Positive for activity change and appetite change. Negative for chills and fever. HENT: Negative for congestion, rhinorrhea, sinus pressure, sneezing and sore throat. Eyes: Positive for photophobia. Negative for visual disturbance. Respiratory: Negative for cough and shortness of breath. Cardiovascular: Negative for chest pain. Gastrointestinal: Positive for nausea. Negative for abdominal pain, blood in stool, constipation, diarrhea and vomiting. Genitourinary: Negative for difficulty urinating, dysuria, flank pain, frequency, hematuria, menstrual problem, urgency, vaginal bleeding and vaginal discharge. Musculoskeletal: Positive for neck pain. Negative for arthralgias, back pain, gait problem, myalgias and neck stiffness. Skin: Negative for rash and wound. Neurological: Positive for headaches. Negative for dizziness, syncope, facial asymmetry, speech difficulty, weakness, light-headedness and numbness. Psychiatric/Behavioral: Negative for self-injury and suicidal ideas. All other systems reviewed and are negative. Vitals:    05/20/20 0936   BP: 117/70   Pulse: (!) 48   Resp: 16   Temp: 98 °F (36.7 °C)   SpO2: 100%   Weight: 69.4 kg (153 lb)   Height: 5' 6\" (1.676 m)            Physical Exam  Vitals signs and nursing note reviewed. Constitutional:       General: She is not in acute distress. Appearance: Normal appearance. She is well-developed. She is not diaphoretic. Comments: Uncomfortable appearing but in no acute distress. HENT:      Head: Normocephalic and atraumatic. Nose: Nose normal.   Eyes:      Extraocular Movements: Extraocular movements intact.       Conjunctiva/sclera: Conjunctivae normal.      Pupils: Pupils are equal, round, and reactive to light. Neck:      Musculoskeletal: Normal range of motion and neck supple. No neck rigidity. Cardiovascular:      Rate and Rhythm: Normal rate and regular rhythm. Heart sounds: Normal heart sounds. Pulmonary:      Effort: Pulmonary effort is normal.      Breath sounds: Normal breath sounds. Abdominal:      General: There is no distension. Palpations: Abdomen is soft. Tenderness: There is no abdominal tenderness. Musculoskeletal:         General: No tenderness. Skin:     General: Skin is warm and dry. Neurological:      General: No focal deficit present. Mental Status: She is alert and oriented to person, place, and time. Cranial Nerves: No cranial nerve deficit. Sensory: No sensory deficit. Motor: No weakness. Coordination: Coordination normal.      Comments: Intact sensation, 5/5 strength in all 4 extremities, intact finger to nose, neg pronator drift, fluent speech, CN intact. MDM   44-year-old female presents with migraine headache type symptoms. Full range of motion of the neck with no meningeal signs, low suspicion for meningitis or other emergent etiology for her symptoms. Patient is afebrile with vital signs stable. Neuro intact, as above. She was offered a CT of her head but given low suspicion for acute intracranial abnormality was reassuring neuro exam the patient would rather try a migraine cocktail first and if unalleviated pursue CT scan. Migraine cocktail was given IV and she was reassessed and found to have some improvement in her symptoms headache now about 5/10. We will give dose of Decadron and per uptodate literature review, recommended refraining from breast-feeding for 4 hours after given. She was recommended to follow-up with neurology for further evaluation of her symptoms and return precautions were given for worsening or concerns.     Procedures

## 2020-05-27 ENCOUNTER — VIRTUAL VISIT (OUTPATIENT)
Dept: NEUROLOGY | Age: 40
End: 2020-05-27

## 2020-05-27 ENCOUNTER — TELEPHONE (OUTPATIENT)
Dept: NEUROLOGY | Age: 40
End: 2020-05-27

## 2020-05-27 DIAGNOSIS — R51.9 ACUTE INTRACTABLE HEADACHE, UNSPECIFIED HEADACHE TYPE: Primary | ICD-10-CM

## 2020-05-27 NOTE — TELEPHONE ENCOUNTER
Please see if PA is needed for a nerve block? Sorry, I cannot remember which kind Dr. Fred Abreu said and his note from today is not finished.   Please and thank you!!

## 2020-05-27 NOTE — PROGRESS NOTES
Consent: Della Jj, who was seen by synchronous (real-time) audio-video technology, and/or her healthcare decision maker, is aware that this patient-initiated, Telehealth encounter on 5/27/2020 is a billable service, with coverage as determined by her insurance carrier. She is aware that she may receive a bill and has provided verbal consent to proceed: Yes. CHIEF COMPLAINT:  This is Della Jj is a 47 y.o. female who had concerns including New Patient (migraines). 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 (+) nausea (-) vomiting (+) photophobia (+) phonophobia. Ibuprofen did not help. Patient was seen at [facility] ER and was given migraine meds and advise to follow up with neurology.       ROS   (-) fever  (-) rash  All other systems reviewed and are negative    PMH  Past Medical History:   Diagnosis Date    Abnormal Pap smear of cervix     History of cryosurgery 09/2005    GOVIND I    Neurocardiogenic syncope     Psychiatric problem     depression       Social Hx  Social History     Socioeconomic History    Marital status:      Spouse name: Not on file    Number of children: Not on file    Years of education: Not on file    Highest education level: Not on file   Tobacco Use    Smoking status: Never Smoker    Smokeless tobacco: Never Used   Substance and Sexual Activity    Alcohol use: Not Currently     Alcohol/week: 2.0 standard drinks     Types: 2 Glasses of wine per week     Comment: weekly    Drug use: No    Sexual activity: Yes     Partners: Male     Birth control/protection: None   Other Topics Concern       Family Hx  Family History   Problem Relation Age of Onset    Hypertension Mother     Elevated Lipids Mother     Colon Polyps Mother     Hypertension Father     Elevated Lipids Father     Colon Polyps Father     No Known Problems Sister     Bleeding Prob Brother         ITP    Cancer Maternal Grandmother         colon cancer    Cancer Paternal Grandmother         colon cancer    Heart Disease Paternal Grandfather     Hypertension Paternal Grandfather     No Known Problems Sister     No Known Problems Brother     No Known Problems Daughter        ALLERGIES  Allergies   Allergen Reactions    Cashew Nut Itching     Pt states she is only allergic to cashew nuts no other kind       CURRENT MEDS  Current Outpatient Medications   Medication Sig Dispense Refill    sertraline (ZOLOFT) 25 mg tablet TAKE 1 TABLET BY MOUTH EVERY DAY 30 Tab 2    PNV No12-Iron-FA-DSS-OM-3 29 mg iron-1 mg -50 mg CPKD Take  by mouth.  ibuprofen (MOTRIN) 800 mg tablet Take 1 Tab by mouth every six (6) hours as needed for Pain. 60 Tab 0           PREVIOUS WORKUP  IMAGING:    CT Results (recent):  Results from Hospital Encounter encounter on 08/01/16   CT HEAD WO CONT    Narrative **Final Report**       ICD Codes / Adm. Diagnosis: 159232   / Head Injury  Head Trauma  Examination:  CT HEAD WO CON  - [id number] - Aug  1 2016  1:40PM  Accession No:  [id number]  Reason:  head injury      REPORT:  EXAM:  CT HEAD WO CON    INDICATION:   head injury    COMPARISON: None. TECHNIQUE: Unenhanced CT of the head was performed using 5 mm images. Brain   and bone windows were generated. CT dose reduction was achieved through use   of a standardized protocol tailored for this examination and automatic   exposure control for dose modulation. Adaptive statistical iterative   reconstruction (ASIR) was utilized. FINDINGS:  The ventricles and sulci are normal in size, shape and configuration and   midline. There is no significant white matter disease. There is no   intracranial hemorrhage, extra-axial collection, mass, mass effect or   midline shift. The basilar cisterns are open. No acute infarct is   identified.  The bone windows demonstrate no abnormalities. The visualized   portions of the paranasal sinuses and mastoid air cells are clear. IMPRESSION: No acute abnormality identified              Signing/Reading Doctor: Ramona Wood ([id number])    Approved: Ramona Wood ([id number])  Aug  1 2016  2:01PM                                    MRI Results (recent):  No results found for this or any previous visit. IR Results (recent):  No results found for this or any previous visit. VAS/US Results (recent):  No results found for this or any previous visit. (I personally reviewed these images in PACS and this is my impression)    LABS (reviewed)    No visits with results within 3 Month(s) from this visit. Latest known visit with results is:   Admission on 01/17/2020, Discharged on 01/19/2020   Component Date Value Ref Range Status    WBC 01/17/2020 11.5* 3.6 - 11.0 K/uL Final    RBC 01/17/2020 3.90  3.80 - 5.20 M/uL Final    HGB 01/17/2020 11.9  11.5 - 16.0 g/dL Final    HCT 01/17/2020 34.6* 35.0 - 47.0 % Final    MCV 01/17/2020 88.7  80.0 - 99.0 FL Final    MCH 01/17/2020 30.5  26.0 - 34.0 PG Final    MCHC 01/17/2020 34.4  30.0 - 36.5 g/dL Final    RDW 01/17/2020 12.7  11.5 - 14.5 % Final    PLATELET 11/88/0070 388  150 - 400 K/uL Final    MPV 01/17/2020 9.0  8.9 - 12.9 FL Final    NRBC 01/17/2020 0.0  0  WBC Final    ABSOLUTE NRBC 01/17/2020 0.00  0.00 - 0.01 K/uL Final    NEUTROPHILS 01/17/2020 75  32 - 75 % Final    LYMPHOCYTES 01/17/2020 15  12 - 49 % Final    MONOCYTES 01/17/2020 8  5 - 13 % Final    EOSINOPHILS 01/17/2020 1  0 - 7 % Final    BASOPHILS 01/17/2020 0  0 - 1 % Final    IMMATURE GRANULOCYTES 01/17/2020 1* 0.0 - 0.5 % Final    ABS. NEUTROPHILS 01/17/2020 8.6* 1.8 - 8.0 K/UL Final    ABS. LYMPHOCYTES 01/17/2020 1.7  0.8 - 3.5 K/UL Final    ABS. MONOCYTES 01/17/2020 0.9  0.0 - 1.0 K/UL Final    ABS. EOSINOPHILS 01/17/2020 0.1  0.0 - 0.4 K/UL Final    ABS.  BASOPHILS 01/17/2020 0.0  0.0 - 0.1 K/UL Final    ABS. IMM.  GRANS. 01/17/2020 0.1* 0.00 - 0.04 K/UL Final    DF 01/17/2020 AUTOMATED    Final       Objective:   Vital Signs: (As obtained by patient/caregiver at home)  Visit Vitals  LMP  (LMP Unknown)        [INSTRUCTIONS:  \"[x]\" Indicates a positive item  \"[]\" Indicates a negative item  -- DELETE ALL ITEMS NOT EXAMINED]    Constitutional: [x] Appears well-developed and well-nourished [x] No apparent distress      [] Abnormal -     Mental status: [x] Alert and awake  [x] Oriented to person/place/time [x] Able to follow commands    [] Abnormal -     Eyes:   EOM    [x]  Normal    [] Abnormal -   Sclera  [x]  Normal    [] Abnormal -          Discharge [x]  None visible   [] Abnormal -     HENT: [x] Normocephalic, atraumatic  [] Abnormal -   [x] Mouth/Throat: Mucous membranes are moist    External Ears [x] Normal  [] Abnormal -    Neck: [x] No visualized mass [] Abnormal -     Pulmonary/Chest: [x] Respiratory effort normal   [x] No visualized signs of difficulty breathing or respiratory distress        [] Abnormal -      Musculoskeletal:   [x] Normal gait with no signs of ataxia         [x] Normal range of motion of neck        [] Abnormal -     Neurological:        [x] No Facial Asymmetry (Cranial nerve 7 motor function) (limited exam due to video visit)          [x] No gaze palsy        [] Abnormal -          Skin:        [x] No significant exanthematous lesions or discoloration noted on facial skin         [] Abnormal -            Psychiatric:       [x] Normal Affect [] Abnormal -        [x] No Hallucinations    Other pertinent observable physical exam findings:-          Assessment & Plan:   IMPRESSION  Jesus Torres is a 47 y.o. 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 (+) nausea (-) vomiting (+) photophobia (+) phonophobia. Ibuprofen did not help. Patient was seen at [facility] and was given migraine meds and advise to follow up with neurology. Considerations include L occipital neuralgia, cervicogenic headache and atypical migraine, intracatble    RECOMMENDATIONS  1. I had a long discussion with patient. Discussed diagnosis, pathophysiology prognosis, available treatment and formulating plan. All questions were answered. 2. Discussed option of muscle relaxant, but patient is currently breastfeeding her 2 month old son so she declined for now  3. Will try L occipital nerve block instead  4. Depending on above, will consider Naproxen 500 and physical therapy  5. Will do MRI cervical spine if still no improvement despite above    Follow-up and Dispositions    · Return for L occipital nerve block. Eileen Bess is a 47 y.o. female being evaluated by a video visit encounter for concerns as above. A caregiver was present when appropriate. Due to this being a TeleHealth encounter (During [facility]- public health emergency), evaluation of the following organ systems was limited: Vitals/Constitutional/EENT/Resp/CV/GI//MS/Neuro/Skin/Heme-Lymph-Imm. Pursuant to the emergency declaration under the [facility], 1135 waiver authority and the [employer] Act, this Virtual  Visit was conducted, with patient's (and/or legal guardian's) consent, to reduce the patient's risk of exposure to COVID-19 and provide necessary medical care. Services were provided through a video synchronous discussion virtually to substitute for in-person clinic visit. Patient and provider were located at their individual homes.         Mary Lou Farfan MD  Diplomate, American Board of Psychiatry and Neurology  Diplomate, Neuromuscular Medicine  Diplomate, American Board of Electrodiagnostic Medicine

## 2020-05-27 NOTE — LETTER
5/27/2020 12:55 PM 
 
Patient:  Emanuel Haddad YOB: 1980 Date of Visit: 5/27/2020 Dear Rachid Ray MD 
[place] [address] [address] [phone] VIA In Basket: Thank you for referring Ms. Emanuel Haddad to me for evaluation/treatment. Below are the relevant portions of my assessment and plan of care. If you have questions, please do not hesitate to call me. I look forward to following Ms. Liang along with you. Sincerely, Bev March MD

## 2020-05-28 NOTE — TELEPHONE ENCOUNTER
----- Message from Bree Brown sent at 5/28/2020  1:21 PM EDT -----  Regarding: OVI/TELEPHONE  Contact: [phone]  Caller's first and last name:  Swati Quiñones  Reason for call: Inquiring if the injection for her neck is coved by her insurance  Callback required yes/no and why: Yes  Best contact number(s): [phone]  Details to clarify the request: Patient would like to know if the injection for her neck is coved by her insurance. Please call to advise.

## 2020-05-28 NOTE — TELEPHONE ENCOUNTER
Prior Authorization NOT REQUIRED for Occipital Nerve Block (CPT [id number]), according to [address]. Spoke with [facility], [address], who confirmed code does not require PA. She stated patient has a maximum of 3 visits with this code per 180 days. Patient can be scheduled at any time.

## 2020-05-28 NOTE — TELEPHONE ENCOUNTER
Called and informed patient that PA was not required and scheduled her for Monday L occipital nerve block.

## 2020-06-01 ENCOUNTER — OFFICE VISIT (OUTPATIENT)
Dept: NEUROLOGY | Age: 40
End: 2020-06-01

## 2020-06-01 VITALS — TEMPERATURE: 97.5 F

## 2020-06-01 DIAGNOSIS — M54.81 OCCIPITAL NEURALGIA OF LEFT SIDE: ICD-10-CM

## 2020-06-01 DIAGNOSIS — R51.9 ACUTE INTRACTABLE HEADACHE, UNSPECIFIED HEADACHE TYPE: Primary | ICD-10-CM

## 2020-06-01 NOTE — PROGRESS NOTES
Occipital Nerve Block    Date:@      Patient ID:  Juno Arenas  [id number]  1980    Occipital nerve blocks:  1 cc DepoMedrol (40 mg) and 2 cc Lidocaine (10 mg) injected 1 cc into each left  greater and lesser Occipital Nerves and upper neck region (Total of 3 cc given). The patient tolerated the procedure well.        [facility] [place]  OFFICE PROCEDURE PROGRESS NOTE        Chart reviewed for the following:   Chadwick Hahn MD, have reviewed the History, Physical and updated the Allergic reactions for [address] performed immediately prior to start of procedure:   Chadwick Hahn MD, have performed the following reviews on Juno Arenas prior to the start of the procedure:            * Patient was identified by name and date of birth   * Agreement on procedure being performed was verified  * Risks and Benefits explained to the patient  * Procedure site verified and marked as necessary  * Patient was positioned for comfort  * Consent was signed and verified       Date of procedure: 1/24/2019    Procedure performed by:  Fabiola Andrade MD    Provider assisted by: Charlotte Beck RN    Patient assisted by: self    How tolerated by patient: tolerated the procedure well with no complications    Post Procedural Pain Scale: 2 - Hurts Little Bit    Comments: (+) tenderness and tightness of L occipital area          Fabiola Andrade MD  Diplomate, American Board of Psychiatry and Neurology  Diplomate, Neuromuscular Medicine  Diplomate, American Board of Electrodiagnostic Medicine

## 2020-10-02 ENCOUNTER — VIRTUAL VISIT (OUTPATIENT)
Dept: INTERNAL MEDICINE CLINIC | Age: 40
End: 2020-10-02
Payer: COMMERCIAL

## 2020-10-02 DIAGNOSIS — R51.9 OCCIPITAL HEADACHE: ICD-10-CM

## 2020-10-02 DIAGNOSIS — F41.9 ANXIETY AND DEPRESSION: Primary | ICD-10-CM

## 2020-10-02 DIAGNOSIS — F32.A ANXIETY AND DEPRESSION: Primary | ICD-10-CM

## 2020-10-02 PROCEDURE — 99213 OFFICE O/P EST LOW 20 MIN: CPT | Performed by: INTERNAL MEDICINE

## 2020-10-02 RX ORDER — SERTRALINE HYDROCHLORIDE 25 MG/1
TABLET, FILM COATED ORAL
Qty: 90 TAB | Refills: 1 | Status: SHIPPED | OUTPATIENT
Start: 2020-10-02 | End: 2021-12-30 | Stop reason: ALTCHOICE

## 2020-10-02 NOTE — PROGRESS NOTES
Consent: Joanna Arnold, who was seen by synchronous (real-time) audio-video technology, and/or her healthcare decision maker, is aware that this patient-initiated, Telehealth encounter on 10/2/2020 is a billable service, with coverage as determined by her insurance carrier. She is aware that she may receive a bill and has provided verbal consent to proceed: YES  712  Subjective:   Joanna Arnold is a 47 y.o. female who was seen for Medication Evaluation    Doing well on zoloft 25 mg -8 mo son doing well and 9 yo dt doing school from home. Plans to wean son from nursing in about 4 mo and wonders what impact hormonal flux will have on mood which is currently good. Occipital ha better with nerve block in June and no recurrence    Current Outpatient Medications   Medication Sig    sertraline (ZOLOFT) 25 mg tablet TAKE 1 TABLET BY MOUTH ONE TIME A DAY    ibuprofen (MOTRIN) 800 mg tablet Take 1 Tab by mouth every six (6) hours as needed for Pain.  PNV No12-Iron-FA-DSS-OM-3 29 mg iron-1 mg -50 mg CPKD Take  by mouth. No current facility-administered medications for this visit. Allergies   Allergen Reactions    Cashew Nut Itching     Pt states she is only allergic to cashew nuts no other kind       Past Medical History:   Diagnosis Date    Abnormal Pap smear of cervix     Headache     History of cryosurgery 09/2005    GOVIND I    Neurocardiogenic syncope     Psychiatric problem     depression       ROS  All other systems reviewed and negative, unless mentioned in HPI    Objective:   Vital Signs: (As obtained by patient/caregiver at home)  There were no vitals taken for this visit.      [INSTRUCTIONS:  \"[x]\" Indicates a positive item  \"[]\" Indicates a negative item  -- DELETE ALL ITEMS NOT EXAMINED]    Constitutional: [x] Appears well-developed and well-nourished [x] No apparent distress      [] Abnormal -     Mental status: [x] Alert and awake  [x] Oriented to person/place/time [x] Able to follow commands [] Abnormal -     Eyes:   EOM    [x]  Normal    [] Abnormal -   Sclera  [x]  Normal    [] Abnormal -          Discharge [x]  None visible   [] Abnormal -     HENT: [x] Normocephalic, atraumatic  [] Abnormal -       External Ears [x] Normal  [] Abnormal -    Neck: [x] No visualized mass [] Abnormal -     Pulmonary/Chest: [x] Respiratory effort normal   [x] No visualized signs of difficulty breathing or respiratory distress        [] Abnormal -      Musculoskeletal:            [x] Normal range of motion of neck        [] Abnormal -     Neurological:        [x] No Facial Asymmetry (Cranial nerve 7 motor function) (limited exam due to video visit)          [x] No gaze palsy        [] Abnormal -          Skin:        [x] No significant exanthematous lesions or discoloration noted on facial skin         [] Abnormal -            Psychiatric:       [x] Normal Affect [] Abnormal -           Other pertinent observable physical exam findings:-        Assessment & Plan:   Diagnoses and all orders for this visit:    1. Anxiety and depression    2. Occipital headache    Other orders  -     sertraline (ZOLOFT) 25 mg tablet; TAKE 1 TABLET BY MOUTH ONE TIME A DAY    Doing well cont meds for 6 mo and appt in spring and consider taper off then        We discussed the expected course, resolution and complications of the diagnosis(es) in detail. Medication risks, benefits, costs, interactions, and alternatives were discussed as indicated. I advised her to contact the office if her condition worsens, changes or fails to improve as anticipated. She expressed understanding with the diagnosis(es) and plan. Deep Brush is a 47 y.o. female being evaluated by a video visit encounter for concerns as above. A caregiver was present when appropriate.  Due to this being a TeleHealth encounter (During [facility] public health emergency), evaluation of the following organ systems was limited: Vitals/Constitutional/EENT/Resp/CV/GI//MS/Neuro/Skin/Heme-Lymph-Imm. Pursuant to the emergency declaration under the [address] [place] [address], [facility] waiver authority and the [employer] Act, this Virtual  Visit was conducted, with patient's (and/or legal guardian's) consent, to reduce the patient's risk of exposure to COVID-19 and provide necessary medical care. Services were provided through a video synchronous discussion virtually to substitute for in-person clinic visit. Patient and provider were located at their individual homes.

## 2020-10-12 ENCOUNTER — OFFICE VISIT (OUTPATIENT)
Dept: OBGYN CLINIC | Age: 40
End: 2020-10-12
Payer: COMMERCIAL

## 2020-10-12 VITALS
BODY MASS INDEX: 24.08 KG/M2 | WEIGHT: 149.8 LBS | SYSTOLIC BLOOD PRESSURE: 125 MMHG | HEIGHT: 66 IN | DIASTOLIC BLOOD PRESSURE: 71 MMHG

## 2020-10-12 DIAGNOSIS — Z01.419 ENCOUNTER FOR GYNECOLOGICAL EXAMINATION (GENERAL) (ROUTINE) WITHOUT ABNORMAL FINDINGS: Primary | ICD-10-CM

## 2020-10-12 PROCEDURE — 99395 PREV VISIT EST AGE 18-39: CPT | Performed by: OBSTETRICS & GYNECOLOGY

## 2020-10-12 NOTE — PATIENT INSTRUCTIONS
Well Visit, Ages 25 to 48: Care Instructions Your Care Instructions Physical exams can help you stay healthy. Your doctor has checked your overall health and may have suggested ways to take good care of yourself. He or she also may have recommended tests. At home, you can help prevent illness with healthy eating, regular exercise, and other steps. Follow-up care is a key part of your treatment and safety. Be sure to make and go to all appointments, and call your doctor if you are having problems. It's also a good idea to know your test results and keep a list of the medicines you take. How can you care for yourself at home? · Reach and stay at a healthy weight. This will lower your risk for many problems, such as obesity, diabetes, heart disease, and high blood pressure. · Get at least 30 minutes of physical activity on most days of the week. Walking is a good choice. You also may want to do other activities, such as running, swimming, cycling, or playing tennis or team sports. Discuss any changes in your exercise program with your doctor. · Do not smoke or allow others to smoke around you. If you need help quitting, talk to your doctor about stop-smoking programs and medicines. These can increase your chances of quitting for good. · Talk to your doctor about whether you have any risk factors for sexually transmitted infections (STIs). Having one sex partner (who does not have STIs and does not have sex with anyone else) is a good way to avoid these infections. · Use birth control if you do not want to have children at this time. Talk with your doctor about the choices available and what might be best for you. · Protect your skin from too much sun. When you're outdoors from 10 a.m. to 4 p.m., stay in the shade or cover up with clothing and a hat with a wide brim. Wear sunglasses that block UV rays. Even when it's cloudy, put broad-spectrum sunscreen (SPF 30 or higher) on any exposed skin. · See a dentist one or two times a year for checkups and to have your teeth cleaned. · Wear a seat belt in the car. Follow your doctor's advice about when to have certain tests. These tests can spot problems early. For everyone · Cholesterol. Have the fat (cholesterol) in your blood tested after age 21. Your doctor will tell you how often to have this done based on your age, family history, or other things that can increase your risk for heart disease. · Blood pressure. Have your blood pressure checked during a routine doctor visit. Your doctor will tell you how often to check your blood pressure based on your age, your blood pressure results, and other factors. · Vision. Talk with your doctor about how often to have a glaucoma test. 
· Diabetes. Ask your doctor whether you should have tests for diabetes. · Colon cancer. Your risk for colorectal cancer gets higher as you get older. Some experts say that adults should start regular screening at age 48 and stop at age 76. Others say to start before age 48 or continue after age 76. Talk with your doctor about your risk and when to start and stop screening. For women · Breast exam and mammogram. Talk to your doctor about when you should have a clinical breast exam and a mammogram. Medical experts differ on whether and how often women under 50 should have these tests. Your doctor can help you decide what is right for you. · Cervical cancer screening test and pelvic exam. Begin with a Pap test at age 24. The test often is part of a pelvic exam. Starting at age 27, you may choose to have a Pap test, an HPV test, or both tests at the same time (called co-testing). Talk with your doctor about how often to have testing. · Tests for sexually transmitted infections (STIs). Ask whether you should have tests for STIs. You may be at risk if you have sex with more than one person, especially if your partners do not wear condoms. For men · Tests for sexually transmitted infections (STIs). Ask whether you should have tests for STIs. You may be at risk if you have sex with more than one person, especially if you do not wear a condom. · Testicular cancer exam. Ask your doctor whether you should check your testicles regularly. · Prostate exam. Talk to your doctor about whether you should have a blood test (called a PSA test) for prostate cancer. Experts differ on whether and when men should have this test. Some experts suggest it if you are older than 39 and are -American or have a father or brother who got prostate cancer when he was younger than 72. When should you call for help? Watch closely for changes in your health, and be sure to contact your doctor if you have any problems or symptoms that concern you. Where can you learn more? Go to http://www.herndon.com/ Enter P072 in the search box to learn more about \"Well Visit, Ages 25 to 48: Care Instructions. \" Current as of: May 27, 2020               Content Version: 12.6 © 2006-2020 [employer]. Care instructions adapted under license by [employer] (which disclaims liability or warranty for this information). If you have questions about a medical condition or this instruction, always ask your healthcare professional. [address] any warranty or liability for your use of this information.

## 2020-10-12 NOTE — PROGRESS NOTES
Terrence Mendiola is a ,  47 y.o. female Formerly named [facility] whose Patient's last menstrual period was 2020. was on 2020 who presents for her annual checkup. She is having no significant problems. With regard to the Gardisil vaccine, she is older than the FDA approved age to receive it. Menstrual status:    Her periods are  moderate in flow. She is using three to five pads or tampons per day. Patient had first menstrual since delivery    She denies dysmenorrhea. She reports no premenstrual symptoms. Contraception:    The current method of family planning is none and She declines contraception and counseling. Sexual history:    She  reports being sexually active and has had partner(s) who are Male. She reports using the following method of birth control/protection: None. Medical conditions:    Since her last annual GYN exam about three or more years ago, she has not the following changes in her health history: none. Pap and Mammogram History:    Her most recent Pap smear was 2019 normal/HPV neg    The patient has never had a mammogram.    The patient does not have a family history of breast cancer.     Past Medical History:   Diagnosis Date    Abnormal Pap smear of cervix     Headache     History of cryosurgery 2005    GOVIND I    Neurocardiogenic syncope     Psychiatric problem     depression     Past Surgical History:   Procedure Laterality Date    COLONOSCOPY N/A 2018    COLONOSCOPY performed by Nabeel Nunez MD at [address] [facility] HX ABDOMINAL LAPAROSCOPY  10/2009    r/o ectopic    HX DILATION AND EVACUATION  10/2009    SAB    HX GYN  2013    HSG    HX LEEP PROCEDURE   + 2004    GOVIND II    HX OTHER SURGICAL      HX WISDOM TEETH EXTRACTION         Current Outpatient Medications   Medication Sig Dispense Refill    sertraline (ZOLOFT) 25 mg tablet TAKE 1 TABLET BY MOUTH ONE TIME A DAY 90 Tab 1    ibuprofen (MOTRIN) 800 mg tablet Take 1 Tab by mouth every six (6) hours as needed for Pain. 60 Tab 0    PNV No12-Iron-FA-DSS-OM-3 29 mg iron-1 mg -50 mg CPKD Take  by mouth.        Allergies: Cashew nut   Social History     Socioeconomic History    Marital status:      Spouse name: Not on file    Number of children: Not on file    Years of education: Not on file    Highest education level: Not on file   Occupational History    Not on file   Social Needs    Financial resource strain: Not on file    Food insecurity     Worry: Not on file     Inability: Not on file    Transportation needs     Medical: Not on file     Non-medical: Not on file   Tobacco Use    Smoking status: Never Smoker    Smokeless tobacco: Never Used   Substance and Sexual Activity    Alcohol use: Not Currently     Alcohol/week: 2.0 standard drinks     Types: 2 Glasses of wine per week     Comment: weekly    Drug use: No    Sexual activity: Yes     Partners: Male     Birth control/protection: None   Lifestyle    Physical activity     Days per week: Not on file     Minutes per session: Not on file    Stress: Not on file   Relationships    Social connections     Talks on phone: Not on file     Gets together: Not on file     Attends Latter day service: Not on file     Active member of club or organization: Not on file     Attends meetings of clubs or organizations: Not on file     Relationship status: Not on file    Intimate partner violence     Fear of current or ex partner: Not on file     Emotionally abused: Not on file     Physically abused: Not on file     Forced sexual activity: Not on file   Other Topics Concern     Service Not Asked    Blood Transfusions Not Asked    Caffeine Concern Not Asked    Occupational Exposure Not Asked   [place] Hazards Not Asked    Sleep Concern Not Asked    Stress Concern Not Asked    Weight Concern Not Asked    Special Diet Not Asked    Back Care Not Asked    Exercise Not Asked    Bike Helmet Not Asked   [address] Not Asked    Self-Exams Not Asked   Social History Narrative    Not on file     Tobacco History:  reports that she has never smoked. She has never used smokeless tobacco.  Alcohol Abuse:  reports previous alcohol use of about 2.0 standard drinks of alcohol per week. Drug Abuse:  reports no history of drug use.     Patient Active Problem List   Diagnosis Code    Neurocardiogenic syncope R55    Antepartum multigravida of advanced maternal age O12.46    Labor and delivery, indication for care O75.9       Review of Systems - History obtained from the patient  Constitutional: negative for weight loss, fever, night sweats  HEENT: negative for hearing loss, earache, congestion, snoring, sorethroat  CV: negative for chest pain, palpitations, edema  Resp: negative for cough, shortness of breath, wheezing  GI: negative for change in bowel habits, abdominal pain, black or bloody stools  : negative for frequency, dysuria, hematuria, vaginal discharge  MSK: negative for back pain, joint pain, muscle pain  Breast: negative for breast lumps, nipple discharge, galactorrhea  Skin :negative for itching, rash, hives  Neuro: negative for dizziness, headache, confusion, weakness  Psych: negative for anxiety, depression, change in mood  Heme/lymph: negative for bleeding, bruising, pallor    Physical Exam    Visit Vitals  /71   Ht 5' 6\" (1.676 m)   Wt 149 lb 12.8 oz (67.9 kg)   LMP 09/25/2020   BMI 24.18 kg/m²       Constitutional  · Appearance: well-nourished, well developed, alert, in no acute distress    HENT  · Head and Face: appears normal    Neck  · Inspection/Palpation: normal appearance, no masses or tenderness  · Lymph Nodes: no lymphadenopathy present  · Thyroid: gland size normal, nontender, no nodules or masses present on palpation    Chest  · Respiratory Effort: breathing normal  · Auscultation: normal breath sounds    Cardiovascular  · Heart:  · Auscultation: regular rate and rhythm without murmur    Breasts  · Inspection of Breasts: breasts symmetrical, no skin changes, no discharge present, nipple appearance normal, no skin retraction present  · Palpation of Breasts and Axillae: no masses present on palpation, no breast tenderness  · Axillary Lymph Nodes: no lymphadenopathy present    Gastrointestinal  · Abdominal Examination: abdomen non-tender to palpation, normal bowel sounds, no masses present  · Liver and spleen: no hepatomegaly present, spleen not palpable  · Hernias: no hernias identified    Genitourinary  · External Genitalia: normal appearance for age, no discharge present, no tenderness present, no inflammatory lesions present, no masses present, no atrophy present  · Vagina: normal vaginal vault without central or paravaginal defects, no discharge present, no inflammatory lesions present, no masses present  · Bladder: non-tender to palpation  · Urethra: appears normal  · Cervix: normal   · Uterus: normal size, shape and consistency  · Adnexa: no adnexal tenderness present, no adnexal masses present  · Perineum: perineum within normal limits, no evidence of trauma, no rashes or skin lesions present  · Anus: anus within normal limits, no hemorrhoids present  · Inguinal Lymph Nodes: no lymphadenopathy present    Skin  · General Inspection: no rash, no lesions identified    Neurologic/Psychiatric  · Mental Status:  · Orientation: grossly oriented to person, place and time  · Mood and Affect: mood normal, affect appropriate    . Assessment:  Routine gynecologic examination  Her current medical status is satisfactory with no evidence of significant gynecologic issues.     Plan:  Counseled re: diet, exercise, healthy lifestyle  Return for yearly wellness visits  Disc Mirena

## 2021-02-10 ENCOUNTER — OFFICE VISIT (OUTPATIENT)
Dept: OBGYN CLINIC | Age: 41
End: 2021-02-10
Payer: COMMERCIAL

## 2021-02-10 VITALS
BODY MASS INDEX: 24.11 KG/M2 | WEIGHT: 150 LBS | DIASTOLIC BLOOD PRESSURE: 74 MMHG | SYSTOLIC BLOOD PRESSURE: 122 MMHG | HEIGHT: 66 IN

## 2021-02-10 DIAGNOSIS — O20.0 THREATENED ABORTION: Primary | ICD-10-CM

## 2021-02-10 PROCEDURE — 76801 OB US < 14 WKS SINGLE FETUS: CPT | Performed by: OBSTETRICS & GYNECOLOGY

## 2021-02-10 PROCEDURE — 99213 OFFICE O/P EST LOW 20 MIN: CPT | Performed by: OBSTETRICS & GYNECOLOGY

## 2021-02-10 NOTE — PROGRESS NOTES
Threatened AB note    Primus Deepa is a ,  47 y.o. female [facility] Patient's last menstrual period was 2020 (approximate). making her 8 weeks pregnant. She has not had prenatal care. She denies bleeding and pelvic pain She has not passed tissue. She had a positive pregnancy test. She had a recent pelvic ultrasound that showed:  TV ULTRASOUND PERFORMED  A 1.3CM GESTATIONAL SAC IS SEEN. NO DEFINED FETAL POLE IS SEEN AT THIS TIME  A POSSIBLE HEART BEAT IS SEEN. A FOLLOW UP MAY BE HELPFUL TO DETERMINE VIABILITY. GESTATIONAL AGE BASED ON TODAYS ULTRASOUND. AN 8MM YOLK SAC IS SEEN. RIGHT ADNEXA APPEARS WITHIN NORMAL LIMITS. LEFT OVARY APPEARS WITHIN NORMAL LIMITS. NO FREE FLUID SEEN IN THE CDS. Her past medical history is not significant for risk factors for ectopic pregnancy. She has not had pelvic pain. The patient specifically denies right or left pelvic pain. She does not have a history of a spontaneous . She has not had a recent injury or trauma. Additional complaints: none.      Past Medical History:   Diagnosis Date    Abnormal Pap smear of cervix     Headache     History of cryosurgery 2005    GOVIND I    Neurocardiogenic syncope     Psychiatric problem     depression     Past Surgical History:   Procedure Laterality Date    COLONOSCOPY N/A 2018    COLONOSCOPY performed by Jessica Fisher MD at [address] [facility] HX ABDOMINAL LAPAROSCOPY  10/2009    r/o ectopic    HX DILATION AND EVACUATION  10/2009    [facility]    HX GYN  2013    [facility]    HX LEEP PROCEDURE   + 2004    GOVIND II    HX OTHER SURGICAL      HX WISDOM TEETH EXTRACTION       Social History     Occupational History    Not on file   Tobacco Use    Smoking status: Never Smoker    Smokeless tobacco: Never Used   Substance and Sexual Activity    Alcohol use: Not Currently     Alcohol/week: 2.0 standard drinks     Types: 2 Glasses of wine per week     Comment: weekly    Drug use: No    Sexual activity: Yes     Partners: Male     Birth control/protection: None     Family History   Problem Relation Age of Onset    Hypertension Mother     Elevated Lipids Mother     Colon Polyps Mother     Hypertension Father     Elevated Lipids Father     Colon Polyps Father     No Known Problems Sister     Bleeding Prob Brother         ITP    Cancer Maternal Grandmother         colon cancer    Cancer Paternal Grandmother         colon cancer    Heart Disease Paternal Grandfather     Hypertension Paternal Grandfather     No Known Problems Sister     No Known Problems Brother     No Known Problems Daughter        Allergies   Allergen Reactions    Cashew Nut Itching     Pt states she is only allergic to cashew nuts no other kind     Prior to Admission medications    Medication Sig Start Date End Date Taking? Authorizing Provider   sertraline (ZOLOFT) 25 mg tablet TAKE 1 TABLET BY MOUTH ONE TIME A DAY 10/2/20  Yes Michael Walsh MD   PNV No12-Iron-FA-DSS-OM-3 29 mg iron-1 mg -50 mg CPKD Take  by mouth. Yes Provider, Historical   ibuprofen (MOTRIN) 800 mg tablet Take 1 Tab by mouth every six (6) hours as needed for Pain.  1/17/20   Mik Aj MD        Review of Systems: History obtained from the patient  Constitutional: negative for weight loss, fever, night sweats  Breast: negative for breast lumps, nipple discharge, galactorrhea  GI: negative for change in bowel habits, abdominal pain, black or bloody stools  : negative for frequency, dysuria, hematuria, vaginal discharge  MSK: negative for back pain, joint pain, muscle pain  Skin: negative for itching, rash, hives  Psych: negative for anxiety, depression, change in mood      Objective:  Visit Vitals  /74   Ht 5' 6\" (1.676 m)   Wt 150 lb (68 kg)   LMP 12/14/2020 (Approximate)   BMI 24.21 kg/m²       Physical Exam:   PHYSICAL EXAMINATION    Constitutional  · Appearance: well-nourished, well developed, alert, in no acute distress    Gastrointestinal  · Abdominal Examination: abdomen non-tender to palpation, normal bowel sounds, no masses present  · Liver and spleen: no hepatomegaly present, spleen not palpable  · Hernias: no hernias identified    Genitourinary  · External Genitalia: normal appearance for age, no discharge present, no tenderness present, no inflammatory lesions present, no masses present, no atrophy present  · Vagina: normal vaginal vault without central or paravaginal defects, bloody discharge present, no inflammatory lesions present, no masses present  · Bladder: non-tender to palpation  · Urethra: appears normal  · Cervix: normal   · Uterus: enlarged, soft, mildly tender with normal shape and consistency  · Adnexa: no adnexal tenderness present, no adnexal masses present  · Perineum: perineum within normal limits, no evidence of trauma, no rashes or skin lesions present  · Anus: anus within normal limits, no hemorrhoids present  · Inguinal Lymph Nodes: no lymphadenopathy present    Skin  · General Inspection: no rash, no lesions identified    Neurologic/Psychiatric  · Mental Status:  · Orientation: grossly oriented to person, place and time  · Mood and Affect: mood normal, affect appropriate    Assessment:   Threatened AB  AMA  Plan:   US in one week  Rh pos  Bleeding precautions

## 2021-02-17 ENCOUNTER — OFFICE VISIT (OUTPATIENT)
Dept: OBGYN CLINIC | Age: 41
End: 2021-02-17
Payer: COMMERCIAL

## 2021-02-17 VITALS
HEIGHT: 66 IN | WEIGHT: 152.4 LBS | DIASTOLIC BLOOD PRESSURE: 68 MMHG | BODY MASS INDEX: 24.49 KG/M2 | SYSTOLIC BLOOD PRESSURE: 126 MMHG

## 2021-02-17 DIAGNOSIS — O02.1 MISSED ABORTION: Primary | ICD-10-CM

## 2021-02-17 PROCEDURE — 99213 OFFICE O/P EST LOW 20 MIN: CPT | Performed by: OBSTETRICS & GYNECOLOGY

## 2021-02-17 RX ORDER — MISOPROSTOL 200 UG/1
TABLET ORAL
Qty: 10 TAB | Refills: 0 | Status: SHIPPED | OUTPATIENT
Start: 2021-02-17 | End: 2021-12-30 | Stop reason: ALTCHOICE

## 2021-02-17 RX ORDER — ONDANSETRON 8 MG/1
8 TABLET, ORALLY DISINTEGRATING ORAL
Qty: 10 TAB | Refills: 0 | Status: SHIPPED | OUTPATIENT
Start: 2021-02-17 | End: 2021-12-30 | Stop reason: ALTCHOICE

## 2021-02-17 RX ORDER — HYDROCODONE BITARTRATE AND ACETAMINOPHEN 5; 325 MG/1; MG/1
1 TABLET ORAL
Qty: 5 TAB | Refills: 0 | Status: SHIPPED | OUTPATIENT
Start: 2021-02-17 | End: 2021-02-20

## 2021-02-17 NOTE — PATIENT INSTRUCTIONS
Threatened Miscarriage: Care Instructions  Overview     Some women have light spotting or bleeding during the first 12 weeks of pregnancy. In some cases this is normal. Light spotting or bleeding can also be a sign of a possible loss of the pregnancy. This is called a threatened miscarriage. At this point, the doctor may not be able to tell if your vaginal bleeding is normal or is a sign of a miscarriage. In early pregnancy, things such as stress, exercise, and sex do not cause miscarriage. You may be worried or upset about the possibility of losing your pregnancy. But do not blame yourself. There is no treatment to stop a miscarriage. If you do have a miscarriage, there was nothing you could have done to prevent it. A miscarriage usually means that the pregnancy is not developing normally. Follow-up care is a key part of your treatment and safety. Be sure to make and go to all appointments, and call your doctor if you are having problems. It's also a good idea to know your test results and keep a list of the medicines you take. How can you care for yourself at home? · Take acetaminophen (Tylenol) for cramps. Read and follow all instructions on the label. · Do not take two or more pain medicines at the same time unless the doctor told you to. Many pain medicines have acetaminophen, which is Tylenol. Too much acetaminophen (Tylenol) can be harmful. · Do not have sex until your doctor says it is okay. · Get lots of rest over the next several days. · You may do your normal activities if you feel well enough to do them. But do not do any heavy exercise until your doctor says it is okay. · Eat a balanced diet that is high in iron and vitamin C. Foods rich in iron include red meat, shellfish, eggs, beans, and leafy green vegetables. Foods high in vitamin C include citrus fruits, tomatoes, and broccoli. Talk to your doctor about whether you need to take iron pills or a multivitamin.   · Do not drink alcohol or use tobacco or illegal drugs.  · Do not smoke. If you need help quitting, talk to your doctor about stop-smoking programs and medicines. These can increase your chances of quitting for good.  When should you call for help?   Call 911 anytime you think you may need emergency care. For example, call if:    · You passed out (lost consciousness).   Call your doctor now or seek immediate medical care if:    · You have severe vaginal bleeding.     · You are dizzy or lightheaded, or you feel like you may faint.     · You have new or worse pain in your belly or pelvis.     · You have a fever.     · You have vaginal discharge that smells bad.   Watch closely for changes in your health, and be sure to contact your doctor if:    · You do not get better as expected.   Where can you learn more?  Go to [email or web address]  Enter W564 in the search box to learn more about \"Threatened Miscarriage: Care Instructions.\"  Current as of: February 11, 2020               Content Version: 12.6  © 0655-7596 [employer].   Care instructions adapted under license by [employer] (which disclaims liability or warranty for this information). If you have questions about a medical condition or this instruction, always ask your healthcare professional. [employer] disclaims any warranty or liability for your use of this information.

## 2021-02-17 NOTE — PROGRESS NOTES
Threatened AB note    Bessy Newell is a ,  47 y.o. female [facility] Patient's last menstrual period was 2020. here for ultrasound viability      Her past medical history is not significant for risk factors for ectopic pregnancy. She has not had pelvic pain. Denies any bleeding. The patient specifically denies right or left pelvic pain. She does have a history of a spontaneous . She has not had a recent injury or trauma. Additional complaints: none. TV ULTRASOUND PERFORMED  THERE APPEARS TO BE A GS SEEN WITHIN THE FUNDAL PORTION OF THE ENDOMETRIUM MEASURING 16MM. NO FETAL POLE IS VISUALIZED. GESTATIONAL AGE BASED ON TODAYS ULTRASOUND. A 11MM YOLK SAC IS SEEN. RIGHT OVARY APPEARS WITHIN NORMAL LIMITS. LEFT OVARY APPEARS TO HAVE A CL CYST.  NO FREE FLUID SEEN IN THE CDS.     Past Medical History:   Diagnosis Date    Abnormal Pap smear of cervix     Headache     History of cryosurgery 2005    GOVIND I    Neurocardiogenic syncope     Psychiatric problem     depression     Past Surgical History:   Procedure Laterality Date    COLONOSCOPY N/A 2018    COLONOSCOPY performed by Marquis Carey MD at [address] [facility] HX ABDOMINAL LAPAROSCOPY  10/2009    r/o ectopic    HX DILATION AND EVACUATION  10/2009    SAB    HX GYN  2013    [facility]    HX LEEP PROCEDURE   + 2004    GOVIND II    HX OTHER SURGICAL      HX WISDOM TEETH EXTRACTION       Social History     Occupational History    Not on file   Tobacco Use    Smoking status: Never Smoker    Smokeless tobacco: Never Used   Substance and Sexual Activity    Alcohol use: Not Currently     Alcohol/week: 2.0 standard drinks     Types: 2 Glasses of wine per week     Comment: weekly    Drug use: No    Sexual activity: Yes     Partners: Male     Birth control/protection: None     Family History   Problem Relation Age of Onset    Hypertension Mother     Elevated Lipids Mother     Colon Polyps Mother     Hypertension Father     Elevated Lipids Father     Colon Polyps Father     No Known Problems Sister     Bleeding Prob Brother         ITP    Cancer Maternal Grandmother         colon cancer    Cancer Paternal Grandmother         colon cancer    Heart Disease Paternal Grandfather     Hypertension Paternal Grandfather     No Known Problems Sister     No Known Problems Brother     No Known Problems Daughter        Allergies   Allergen Reactions    Cashew Nut Itching     Pt states she is only allergic to cashew nuts no other kind     Prior to Admission medications    Medication Sig Start Date End Date Taking? Authorizing Provider   miSOPROStoL (CYTOTEC) 200 mcg tablet Place 5 pills in vagina at bedtime, repeat in 12 hours if needed 2/17/21  Yes Yoly Saez MD   HYDROcodone-acetaminophen [facility]) 5-325 mg per tablet Take 1 Tab by mouth every four (4) hours as needed for Pain for up to 3 days. Max Daily Amount: 6 Tabs. 2/17/21 2/20/21 Yes Yoly Saez MD   ondansetron (ZOFRAN ODT) 8 mg disintegrating tablet Take 1 Tab by mouth every eight (8) hours as needed for Nausea or Vomiting. 2/17/21  Yes Yoly Saez MD   sertraline (ZOLOFT) 25 mg tablet TAKE 1 TABLET BY MOUTH ONE TIME A DAY 10/2/20   Geoffrey Hammond MD   ibuprofen (MOTRIN) 800 mg tablet Take 1 Tab by mouth every six (6) hours as needed for Pain. 1/17/20   Yoly Saez MD   PNV No12-Iron-FA-DSS-OM-3 29 mg iron-1 mg -50 mg CPKD Take  by mouth.     Provider, Historical        Review of Systems: History obtained from the patient  Constitutional: negative for weight loss, fever, night sweats  Breast: negative for breast lumps, nipple discharge, galactorrhea  GI: negative for change in bowel habits, abdominal pain, black or bloody stools  : negative for frequency, dysuria, hematuria, vaginal discharge  MSK: negative for back pain, joint pain, muscle pain  Skin: negative for itching, rash, hives  Psych: negative for anxiety, depression, change in mood      Objective:  Visit Vitals  /68   Ht 5' 6\" (1.676 m)   Wt 152 lb 6.4 oz (69.1 kg)   LMP 2020   BMI 24.60 kg/m²       Physical Exam:   PHYSICAL EXAMINATION    Constitutional  · Appearance: well-nourished, well developed, alert, in no acute distress      Skin  · General Inspection: no rash, no lesions identified    Neurologic/Psychiatric  · Mental Status:  · Orientation: grossly oriented to person, place and time  · Mood and Affect: mood normal, affect appropriate    Assessment:   Missed   Rh pos    Plan:   HCG today  Options disc - miso 1000mcg per vagina and repeat in 12 hours if needed  Disc side effects - given zofran, norco - advised may take imodium AD OTC, advil  Fu in 2 weeks with ultrasound - sooner if any complications

## 2021-02-18 LAB — HCG SERPL-ACNC: ABNORMAL MIU/ML (ref 0–6)

## 2021-03-02 NOTE — PROGRESS NOTES
Chief Complaint   Follow-up      HPI  Rita Goodman is a 47 y.o. female who presents for the evaluation of follow up to [facility] 2/10/2021. Patient denies any bleeding or pain   Patient's last menstrual period was 12/14/2020. she is having minimal bleeding now    She took the miso and had good results.  Had heavy bleeding and passed tissue    US today shows thickened endometrium, no sac    Patient does not want to prevent pregnancy          Past Medical History:   Diagnosis Date    Abnormal Pap smear of cervix     Headache     History of cryosurgery 09/2005    GOVIND I    Neurocardiogenic syncope     Psychiatric problem     depression     Past Surgical History:   Procedure Laterality Date    COLONOSCOPY N/A 1/19/2018    COLONOSCOPY performed by Hawk Mobley MD at [address] HX ABDOMINAL LAPAROSCOPY  10/2009    r/o ectopic    HX DILATION AND EVACUATION  10/2009    SAB    HX GYN  08/05/2013    [facility]    HX LEEP PROCEDURE  2001 + 5/2004    GOVIND II    HX OTHER SURGICAL      HX WISDOM TEETH EXTRACTION       Social History     Occupational History    Not on file   Tobacco Use    Smoking status: Never Smoker    Smokeless tobacco: Never Used   Substance and Sexual Activity    Alcohol use: Not Currently     Alcohol/week: 2.0 standard drinks     Types: 2 Glasses of wine per week     Comment: weekly    Drug use: No    Sexual activity: Yes     Partners: Male     Birth control/protection: None     Family History   Problem Relation Age of Onset    Hypertension Mother     Elevated Lipids Mother     Colon Polyps Mother     Hypertension Father     Elevated Lipids Father     Colon Polyps Father     No Known Problems Sister     Bleeding Prob Brother         ITP    Cancer Maternal Grandmother         colon cancer    Cancer Paternal Grandmother         colon cancer    Heart Disease Paternal Grandfather     Hypertension Paternal Grandfather     No Known Problems Sister     No Known Problems Brother     No Known Problems Daughter        Allergies   Allergen Reactions    Cashew Nut Itching     Pt states she is only allergic to cashew nuts no other kind     Prior to Admission medications    Medication Sig Start Date End Date Taking? Authorizing Provider   miSOPROStoL (CYTOTEC) 200 mcg tablet Place 5 pills in vagina at bedtime, repeat in 12 hours if needed 2/17/21   Amadeo Dejesus MD   ondansetron [facility] ODT) 8 mg disintegrating tablet Take 1 Tab by mouth every eight (8) hours as needed for Nausea or Vomiting. 2/17/21   Amadeo Dejesus MD   sertraline (ZOLOFT) 25 mg tablet TAKE 1 TABLET BY MOUTH ONE TIME A DAY 10/2/20   Omar Craft MD   ibuprofen (MOTRIN) 800 mg tablet Take 1 Tab by mouth every six (6) hours as needed for Pain. 1/17/20   Amadeo Dejesus MD   PNV No12-Iron-FA-DSS-OM-3 29 mg iron-1 mg -50 mg CPKD Take  by mouth.     Provider, Historical        Review of Systems: History obtained from the patient  Constitutional: negative for weight loss, fever, night sweats  HEENT: negative for hearing loss, earache, congestion, snoring, sorethroat  CV: negative for chest pain, palpitations, edema  Resp: negative for cough, shortness of breath, wheezing  Breast: negative for breast lumps, nipple discharge, galactorrhea  GI: negative for change in bowel habits, abdominal pain, black or bloody stools  : negative for frequency, dysuria, hematuria, vaginal discharge  MSK: negative for back pain, joint pain, muscle pain  Skin: negative for itching, rash, hives  Neuro: negative for dizziness, headache, confusion, weakness  Psych: negative for anxiety, depression, change in mood  Heme/lymph: negative for bleeding, bruising, pallor    Objective:  Visit Vitals  /76   Ht 5' 6\" (1.676 m)   Wt 152 lb (68.9 kg)   LMP 12/14/2020   BMI 24.53 kg/m²       Physical Exam:   PHYSICAL EXAMINATION    Constitutional  · Appearance: well-nourished, well developed, alert, in no acute distress    HENT  · Head and Face: appears normal    Skin  · General Inspection: no rash, no lesions identified    Neurologic/Psychiatric  · Mental Status:  · Orientation: grossly oriented to person, place and time  · Mood and Affect: mood normal, affect appropriate    Assessment:   Complete     Plan:   Follow HCG to zero  Advised may have irregular bleeding until resolves      RTO prn if symptoms persist or worsen. Instructions given to pt. Handouts given to pt.

## 2021-03-03 ENCOUNTER — OFFICE VISIT (OUTPATIENT)
Dept: OBGYN CLINIC | Age: 41
End: 2021-03-03

## 2021-03-03 VITALS
WEIGHT: 152 LBS | DIASTOLIC BLOOD PRESSURE: 76 MMHG | SYSTOLIC BLOOD PRESSURE: 122 MMHG | BODY MASS INDEX: 24.43 KG/M2 | HEIGHT: 66 IN

## 2021-03-03 DIAGNOSIS — O03.9 COMPLETE ABORTION: Primary | ICD-10-CM

## 2021-03-03 PROBLEM — O09.529 ANTEPARTUM MULTIGRAVIDA OF ADVANCED MATERNAL AGE: Status: RESOLVED | Noted: 2019-06-13 | Resolved: 2021-03-03

## 2021-03-03 LAB — HCG SERPL-ACNC: 26 MIU/ML (ref 0–6)

## 2021-03-03 PROCEDURE — 76830 TRANSVAGINAL US NON-OB: CPT | Performed by: OBSTETRICS & GYNECOLOGY

## 2021-03-03 PROCEDURE — 99213 OFFICE O/P EST LOW 20 MIN: CPT | Performed by: OBSTETRICS & GYNECOLOGY

## 2021-03-03 NOTE — PATIENT INSTRUCTIONS
Threatened Miscarriage: Care Instructions  Overview     Some women have light spotting or bleeding during the first 12 weeks of pregnancy. In some cases this is normal. Light spotting or bleeding can also be a sign of a possible loss of the pregnancy. This is called a threatened miscarriage. At this point, the doctor may not be able to tell if your vaginal bleeding is normal or is a sign of a miscarriage. In early pregnancy, things such as stress, exercise, and sex do not cause miscarriage. You may be worried or upset about the possibility of losing your pregnancy. But do not blame yourself. There is no treatment to stop a miscarriage. If you do have a miscarriage, there was nothing you could have done to prevent it. A miscarriage usually means that the pregnancy is not developing normally. Follow-up care is a key part of your treatment and safety. Be sure to make and go to all appointments, and call your doctor if you are having problems. It's also a good idea to know your test results and keep a list of the medicines you take. How can you care for yourself at home? · Take acetaminophen (Tylenol) for cramps. Read and follow all instructions on the label. · Do not take two or more pain medicines at the same time unless the doctor told you to. Many pain medicines have acetaminophen, which is Tylenol. Too much acetaminophen (Tylenol) can be harmful. · Do not have sex until your doctor says it is okay. · Get lots of rest over the next several days. · You may do your normal activities if you feel well enough to do them. But do not do any heavy exercise until your doctor says it is okay. · Eat a balanced diet that is high in iron and vitamin C. Foods rich in iron include red meat, shellfish, eggs, beans, and leafy green vegetables. Foods high in vitamin C include citrus fruits, tomatoes, and broccoli. Talk to your doctor about whether you need to take iron pills or a multivitamin.   · Do not drink alcohol or use tobacco or illegal drugs. · Do not smoke. If you need help quitting, talk to your doctor about stop-smoking programs and medicines. These can increase your chances of quitting for good. When should you call for help? Call 911 anytime you think you may need emergency care. For example, call if:    · You passed out (lost consciousness). Call your doctor now or seek immediate medical care if:    · You have severe vaginal bleeding.     · You are dizzy or lightheaded, or you feel like you may faint.     · You have new or worse pain in your belly or pelvis.     · You have a fever.     · You have vaginal discharge that smells bad. Watch closely for changes in your health, and be sure to contact your doctor if:    · You do not get better as expected. Where can you learn more? Go to http://www.gray.com/  Enter [id number] in the search box to learn more about \"Threatened Miscarriage: Care Instructions. \"  Current as of: February 11, 2020               Content Version: 12.6  © 2006-2020 [employer]. Care instructions adapted under license by [employer] (which disclaims liability or warranty for this information). If you have questions about a medical condition or this instruction, always ask your healthcare professional. [address] any warranty or liability for your use of this information.

## 2021-03-17 ENCOUNTER — LAB ONLY (OUTPATIENT)
Dept: OBGYN CLINIC | Age: 41
End: 2021-03-17

## 2021-03-17 DIAGNOSIS — O03.9 COMPLETE ABORTION: Primary | ICD-10-CM

## 2021-03-17 LAB — HCG SERPL-ACNC: 2 MIU/ML (ref 0–6)

## 2021-12-16 ENCOUNTER — TELEPHONE (OUTPATIENT)
Dept: INTERNAL MEDICINE CLINIC | Age: 41
End: 2021-12-16

## 2021-12-16 NOTE — TELEPHONE ENCOUNTER
I called pt no answer, LM on VM that there was a scheduling error and appt has been moved 20 mins later. 220pm, same date.  I also sent pt a [employer] message

## 2021-12-30 ENCOUNTER — OFFICE VISIT (OUTPATIENT)
Dept: INTERNAL MEDICINE CLINIC | Age: 41
End: 2021-12-30
Payer: COMMERCIAL

## 2021-12-30 VITALS
HEIGHT: 66 IN | WEIGHT: 152.6 LBS | DIASTOLIC BLOOD PRESSURE: 76 MMHG | BODY MASS INDEX: 24.53 KG/M2 | HEART RATE: 67 BPM | TEMPERATURE: 98.1 F | OXYGEN SATURATION: 100 % | SYSTOLIC BLOOD PRESSURE: 135 MMHG | RESPIRATION RATE: 16 BRPM

## 2021-12-30 DIAGNOSIS — Z11.59 ENCOUNTER FOR HEPATITIS C SCREENING TEST FOR LOW RISK PATIENT: ICD-10-CM

## 2021-12-30 DIAGNOSIS — Z00.00 WELL WOMAN EXAM (NO GYNECOLOGICAL EXAM): Primary | ICD-10-CM

## 2021-12-30 DIAGNOSIS — R55 SYNCOPE, UNSPECIFIED SYNCOPE TYPE: ICD-10-CM

## 2021-12-30 DIAGNOSIS — Z23 NEEDS FLU SHOT: ICD-10-CM

## 2021-12-30 PROCEDURE — 90686 IIV4 VACC NO PRSV 0.5 ML IM: CPT | Performed by: NURSE PRACTITIONER

## 2021-12-30 PROCEDURE — 99395 PREV VISIT EST AGE 18-39: CPT | Performed by: NURSE PRACTITIONER

## 2021-12-30 PROCEDURE — 90471 IMMUNIZATION ADMIN: CPT | Performed by: NURSE PRACTITIONER

## 2021-12-30 PROCEDURE — 99213 OFFICE O/P EST LOW 20 MIN: CPT | Performed by: NURSE PRACTITIONER

## 2021-12-30 NOTE — PROGRESS NOTES
Patient present for routine immunizations. Pt denies any symptoms , reactions or allergies that would exclude them from being immunized today. Risks and adverse reactions were discussed and the VIS was given to them. All questions were addressed. Pt was observed for 10 min post injection. There were no reactions observed.     Maykel Houston

## 2021-12-30 NOTE — PATIENT INSTRUCTIONS
Fainting: Care Instructions  Your Care Instructions     When you faint, or pass out, you lose consciousness for a short time. A brief drop in blood flow to the brain often causes it. When you fall or lie down, more blood flows to your brain and you regain consciousness. Emotional stress, pain, or overheatingespecially if you have been standingcan make you faint. In these cases, fainting is usually not serious. But fainting can be a sign of a more serious problem. Your doctor may want you to have more tests to rule out other causes. The treatment you need depends on the reason why you fainted. The doctor has checked you carefully, but problems can develop later. If you notice any problems or new symptoms, get medical treatment right away. Follow-up care is a key part of your treatment and safety. Be sure to make and go to all appointments, and call your doctor if you are having problems. It's also a good idea to know your test results and keep a list of the medicines you take. How can you care for yourself at home? · Drink plenty of fluids to prevent dehydration. If you have kidney, heart, or liver disease and have to limit fluids, talk with your doctor before you increase your fluid intake. When should you call for help? Call 911 anytime you think you may need emergency care. For example, call if:    · You have symptoms of a heart problem. These may include:  ? Chest pain or pressure. ? Severe trouble breathing. ? A fast or irregular heartbeat. ? Lightheadedness or sudden weakness. ? Coughing up pink, foamy mucus. ? Passing out. After you call 911, the  may tell you to chew 1 adult-strength or 2 to 4 low-dose aspirin. Wait for an ambulance. Do not try to drive yourself.     · You have symptoms of a stroke. These may include:  ? Sudden numbness, tingling, weakness, or loss of movement in your face, arm, or leg, especially on only one side of your body. ? Sudden vision changes.   ? Sudden trouble speaking. ? Sudden confusion or trouble understanding simple statements. ? Sudden problems with walking or balance. ? A sudden, severe headache that is different from past headaches.     · You passed out (lost consciousness) again. Watch closely for changes in your health, and be sure to contact your doctor if:    · You do not get better as expected. Where can you learn more? Go to http://www.gray.com/  Enter A848 in the search box to learn more about \"Fainting: Care Instructions. \"  Current as of: July 1, 2021               Content Version: 13.0  © 8446-8815 [employer]. Care instructions adapted under license by [employer] (which disclaims liability or warranty for this information). If you have questions about a medical condition or this instruction, always ask your healthcare professional. [address] any warranty or liability for your use of this information. Well Visit, Ages 25 to 48: Care Instructions  Overview     Well visits can help you stay healthy. Your doctor has checked your overall health and may have suggested ways to take good care of yourself. Your doctor also may have recommended tests. At home, you can help prevent illness with healthy eating, regular exercise, and other steps. Follow-up care is a key part of your treatment and safety. Be sure to make and go to all appointments, and call your doctor if you are having problems. It's also a good idea to know your test results and keep a list of the medicines you take. How can you care for yourself at home? · Get screening tests that you and your doctor decide on. Screening helps find diseases before any symptoms appear. · Eat healthy foods. Choose fruits, vegetables, whole grains, protein, and low-fat dairy foods. Limit fat, especially saturated fat. Reduce salt in your diet. · Limit alcohol.  If you are a man, have no more than 2 drinks a day or 14 drinks a week. If you are a woman, have no more than 1 drink a day or 7 drinks a week. · Get at least 30 minutes of physical activity on most days of the week. Walking is a good choice. You also may want to do other activities, such as running, swimming, cycling, or playing tennis or team sports. Discuss any changes in your exercise program with your doctor. · Reach and stay at a healthy weight. This will lower your risk for many problems, such as obesity, diabetes, heart disease, and high blood pressure. · Do not smoke or allow others to smoke around you. If you need help quitting, talk to your doctor about stop-smoking programs and medicines. These can increase your chances of quitting for good. · Care for your mental health. It is easy to get weighed down by worry and stress. Learn strategies to manage stress, like deep breathing and mindfulness, and stay connected with your family and community. If you find you often feel sad or hopeless, talk with your doctor. Treatment can help. · Talk to your doctor about whether you have any risk factors for sexually transmitted infections (STIs). You can help prevent STIs if you wait to have sex with a new partner (or partners) until you've each been tested for STIs. It also helps if you use condoms (male or female condoms) and if you limit your sex partners to one person who only has sex with you. Vaccines are available for some STIs, such as HPV. · Use birth control if it's important to you to prevent pregnancy. Talk with your doctor about the choices available and what might be best for you. · If you think you may have a problem with alcohol or drug use, talk to your doctor. This includes prescription medicines (such as amphetamines and opioids) and illegal drugs (such as cocaine and methamphetamine). Your doctor can help you figure out what type of treatment is best for you. · Protect your skin from too much sun.  When you're outdoors from 10 a.m. to 4 p.m., stay in the shade or cover up with clothing and a hat with a wide brim. Wear sunglasses that block UV rays. Even when it's cloudy, put broad-spectrum sunscreen (SPF 30 or higher) on any exposed skin. · See a dentist one or two times a year for checkups and to have your teeth cleaned. · Wear a seat belt in the car. When should you call for help? Watch closely for changes in your health, and be sure to contact your doctor if you have any problems or symptoms that concern you. Where can you learn more? Go to http://www.herndon.com/  Enter P072 in the search box to learn more about \"Well Visit, Ages 25 to 48: Care Instructions. \"  Current as of: February 11, 2021               Content Version: 13.0  © 2833-0887 [employer]. Care instructions adapted under license by [employer] (which disclaims liability or warranty for this information). If you have questions about a medical condition or this instruction, always ask your healthcare professional. [place] [address] any warranty or liability for your use of this information. Influenza (Flu) Vaccine (Inactivated or Recombinant): What You Need to Know  Why get vaccinated? Influenza vaccine can prevent influenza (flu). Flu is a contagious disease that spreads around the [place] every year, usually between October and May. Anyone can get the flu, but it is more dangerous for some people. Infants and young children, people 72years of age and older, pregnant women, and people with certain health conditions or a weakened immune system are at greatest risk of flu complications. Pneumonia, bronchitis, sinus infections and ear infections are examples of flu-related complications. If you have a medical condition, such as heart disease, cancer or diabetes, flu can make it worse. Flu can cause fever and chills, sore throat, muscle aches, fatigue, cough, headache, and runny or stuffy nose.  Some people may have vomiting and diarrhea, though this is more common in children than adults. Each year, thousands of people in the [facility] die from flu, and many more are hospitalized. Flu vaccine prevents millions of illnesses and flu-related visits to the doctor each year. Influenza vaccine  CDC recommends everyone 10months of age and older get vaccinated every flu season. Children 6 months through 6years of age may need 2 doses during a single flu season. Everyone else needs only 1 dose each flu season. It takes about 2 weeks for protection to develop after vaccination. There are many flu viruses, and they are always changing. Each year a new flu vaccine is made to protect against three or four viruses that are likely to cause disease in the upcoming flu season. Even when the vaccine doesn't exactly match these viruses, it may still provide some protection. Influenza vaccine does not cause flu. Influenza vaccine may be given at the same time as other vaccines. Talk with your health care provider  Tell your vaccine provider if the person getting the vaccine:  · Has had an allergic reaction after a previous dose of influenza vaccine, or has any severe, life-threatening allergies. · Has ever had Guillain-Barré Syndrome (also called GBS). In some cases, your health care provider may decide to postpone influenza vaccination to a future visit. People with minor illnesses, such as a cold, may be vaccinated. People who are moderately or severely ill should usually wait until they recover before getting influenza vaccine. Your health care provider can give you more information. Risks of a vaccine reaction  · Soreness, redness, and swelling where shot is given, fever, muscle aches, and headache can happen after influenza vaccine. · There may be a very small increased risk of Guillain-Barré Syndrome (GBS) after inactivated influenza vaccine (the flu shot).   Macie Dickinson children who get the flu shot along with pneumococcal vaccine (PCV13), and/or DTaP vaccine at the same time might be slightly more likely to have a seizure caused by fever. Tell your health care provider if a child who is getting flu vaccine has ever had a seizure. People sometimes faint after medical procedures, including vaccination. Tell your provider if you feel dizzy or have vision changes or ringing in the ears. As with any medicine, there is a very remote chance of a vaccine causing a severe allergic reaction, other serious injury, or death. What if there is a serious problem? An allergic reaction could occur after the vaccinated person leaves the clinic. If you see signs of a severe allergic reaction (hives, swelling of the face and throat, difficulty breathing, a fast heartbeat, dizziness, or weakness), call 9-1-1 and get the person to the nearest hospital.  For other signs that concern you, call your health care provider. Adverse reactions should be reported to the Vaccine Adverse Event Reporting System (VAERS). Your health care provider will usually file this report, or you can do it yourself. Visit the VAERS website at www.vaers. hhs.gov or call 1-672.579.4972. VAERS is only for reporting reactions, and VAERS staff do not give medical advice. The National Vaccine Injury Compensation Program  The National Vaccine Injury Compensation Program (VICP) is a federal program that was created to compensate people who may have been injured by certain vaccines. Visit the VICP website at www.hrsa.gov/vaccinecompensation or call 5-909.366.9003 to learn about the program and about filing a claim. There is a time limit to file a claim for compensation. How can I learn more? · Ask your healthcare provider. · Call your local or state health department. · Contact the Centers for Disease Control and Prevention (CDC):  ? Call 9-517.197.7510 (2-576-IVB-INFO) or  ?  Visit CDC's website at www.cdc.gov/flu  Vaccine Information Statement (Interim)  Inactivated Influenza Vaccine  8/15/2019  42 MAYA Bello [id number]-[id number]  Department of Health and Human Services  Centers for Disease Control and Prevention  Many Vaccine Information Statements are available in [place] and other languages. See www.immunize.org/vis. Muchas hojas de información sobre vacunas están disponibles en español y en otros idiomas. Visite www.immunize.org/vis. Care instructions adapted under license by [employer] (which disclaims liability or warranty for this information). If you have questions about a medical condition or this instruction, always ask your healthcare professional. [place] [address] any warranty or liability for your use of this information. Vaccine Information Statement    Influenza (Flu) Vaccine (Inactivated or Recombinant): What You Need to Know    Many vaccine information statements are available in [place] and other languages. See www.immunize.org/vis. Hojas de información sobre vacunas están disponibles en español y en muchos otros idiomas. Visite www.immunize.org/vis. 1. Why get vaccinated? Influenza vaccine can prevent influenza (flu). Flu is a contagious disease that spreads around the [place] every year, usually between October and May. Anyone can get the flu, but it is more dangerous for some people. Infants and young children, people 72 years and older, pregnant people, and people with certain health conditions or a weakened immune system are at greatest risk of flu complications. Pneumonia, bronchitis, sinus infections, and ear infections are examples of flu-related complications. If you have a medical condition, such as heart disease, cancer, or diabetes, flu can make it worse. Flu can cause fever and chills, sore throat, muscle aches, fatigue, cough, headache, and runny or stuffy nose. Some people may have vomiting and diarrhea, though this is more common in children than adults.      In an average year, thousands of people in the [facility] die from flu, and many more are hospitalized. Flu vaccine prevents millions of illnesses and flu-related visits to the doctor each year. 2. Influenza vaccines     CDC recommends everyone 6 months and older get vaccinated every flu season. Children 6 months through 6years of age may need 2 doses during a single flu season. Everyone else needs only 1 dose each flu season. It takes about 2 weeks for protection to develop after vaccination. There are many flu viruses, and they are always changing. Each year a new flu vaccine is made to protect against the influenza viruses believed to be likely to cause disease in the upcoming flu season. Even when the vaccine doesnt exactly match these viruses, it may still provide some protection. Influenza vaccine does not cause flu. Influenza vaccine may be given at the same time as other vaccines. 3. Talk with your health care provider    Tell your vaccination provider if the person getting the vaccine:   Has had an allergic reaction after a previous dose of influenza vaccine, or has any severe, life-threatening allergies    Has ever had Guillain-Barré Syndrome (also called GBS)    In some cases, your health care provider may decide to postpone influenza vaccination until a future visit. Influenza vaccine can be administered at any time during pregnancy. People who are or will be pregnant during influenza season should receive inactivated influenza vaccine. People with minor illnesses, such as a cold, may be vaccinated. People who are moderately or severely ill should usually wait until they recover before getting influenza vaccine. Your health care provider can give you more information. 4. Risks of a vaccine reaction     Soreness, redness, and swelling where the shot is given, fever, muscle aches, and headache can happen after influenza vaccination.    There may be a very small increased risk of Guillain-Barré Syndrome (GBS) after inactivated influenza vaccine (the flu shot). Leoma March children who get the flu shot along with pneumococcal vaccine (PCV13) and/or DTaP vaccine at the same time might be slightly more likely to have a seizure caused by fever. Tell your health care provider if a child who is getting flu vaccine has ever had a seizure. People sometimes faint after medical procedures, including vaccination. Tell your provider if you feel dizzy or have vision changes or ringing in the ears. As with any medicine, there is a very remote chance of a vaccine causing a severe allergic reaction, other serious injury, or death. 5. What if there is a serious problem? An allergic reaction could occur after the vaccinated person leaves the clinic. If you see signs of a severe allergic reaction (hives, swelling of the face and throat, difficulty breathing, a fast heartbeat, dizziness, or weakness), call 9-1-1 and get the person to the nearest hospital.    For other signs that concern you, call your health care provider. Adverse reactions should be reported to the Vaccine Adverse Event Reporting System (VAERS). Your health care provider will usually file this report, or you can do it yourself. Visit the VAERS website at www.vaers. hhs.gov or call 3-654.879.1213. VAERS is only for reporting reactions, and VAERS staff members do not give medical advice. 6. The National Vaccine Injury Compensation Program    The [facility] Vaccine Injury Compensation Program (VICP) is a federal program that was created to compensate people who may have been injured by certain vaccines. Claims regarding alleged injury or death due to vaccination have a time limit for filing, which may be as short as two years. Visit the VICP website at www.hrsa.gov/vaccinecompensation or call 6-205.564.3531 to learn about the program and about filing a claim. 7. How can I learn more?  Ask your health care provider.     Call your local or [facility] department.  Visit the website of the Food and Drug Administration (FDA) for vaccine package inserts and additional information at www.fda.gov/vaccines-blood-biologics/vaccines.  Contact the Centers for Disease Control and Prevention (CDC):  - Call 0-586.810.6035 (1-800-CDC-INFO) or  - Visit CDCs influenza website at www.cdc.gov/flu. Vaccine Information Statement   Inactivated Influenza Vaccine   8/6/2021  42 MAYA Mathew [id number]   Department of Health and Human Services  Centers for Disease Control and Prevention    Office Use Only

## 2022-01-01 NOTE — PROGRESS NOTES
Subjective:   46 y.o. female for Well Woman Check. Her gyne and breast care is done elsewhere by her Ob-Gyne physician. Has seen Dr Emily Rouse in Feb and March for miscarriage. Allergies   Allergen Reactions    Cashew Nut Itching     Pt states she is only allergic to cashew nuts no other kind     Past Medical History:   Diagnosis Date    Abnormal Pap smear of cervix     Headache     History of cryosurgery 09/2005    GOVIND I    Neurocardiogenic syncope     Psychiatric problem     depression     Past Surgical History:   Procedure Laterality Date    COLONOSCOPY N/A 1/19/2018    COL[facility]alia Carlos [facility]    HX ABDOMINAL LAPAROSCOPY  10/2009    r/o ectopic    HX DILATION AND EVACUATION  10/2009    [facility]    HX GYN  08/05/2013    [facility]    HX LEEP PROCEDURE  2001 + 5/2004    GOVIND II    HX OTHER SURGICAL      HX WISDOM TEETH EXTRACTION       Family History   Problem Relation Age of Onset    Hypertension Mother    Ana See Elevated Lipids Mother     Colon Polyps Mother     Hypertension Father    Ana See Elevated Lipids Father     Colon Polyps Father     Colon Polyps Sister     Bleeding Prob Brother         ITP    Cancer Maternal Grandmother         colon cancer    Cancer Paternal Grandmother         colon cancer    Heart Disease Paternal Grandfather     Hypertension Paternal Grandfather     No Known Problems Sister     No Known Problems Brother     No Known Problems Daughter     No Known Problems Son      Social History     Tobacco Use    Smoking status: Never Smoker    Smokeless tobacco: Never Used   Substance Use Topics    Alcohol use: Not Currently     Alcohol/week: 2.0 standard drinks     Types: 2 Glasses of wine per week     Comment: rarely             ROS: Feeling generally well. No TIA's or unusual headaches, no dysphagia. No prolonged cough. No dyspnea or chest pain on exertion. No abdominal pain, change in bowel habits, black or bloody stools.   No urinary tract symptoms. No new or unusual musculoskeletal symptoms. Specific concerns today: has history of Neurocardiogenic syncope since age 15 and typically has an episode once per year. States that she was told that she would outgrow these episodes as an adult but they have continued. Reports that she has had episodes of syncope lately where she will have nausea and vomiting after she arouses. Unsure of how long she is unconscious during episodes but does not believe it is longer than 30 seconds. Reports she has had evaluation years ago by Cardiology but never by Neurology. Feels like her episodes have changed some with the nausea and vomiting. Denies incontinence during episodes. No one has ever mentioned that she had convulsions. Objective: The patient appears well, alert, oriented x 3, in no distress. Visit Vitals  /76 (BP 1 Location: Left upper arm, BP Patient Position: Sitting, BP Cuff Size: Adult)   Pulse 67   Temp 98.1 °F (36.7 °C) (Oral)   Resp 16   Ht 5' 6\" (1.676 m)   Wt 152 lb 9.6 oz (69.2 kg)   LMP 12/18/2021   SpO2 100%   BMI 24.63 kg/m²     ENT normal.  Neck supple. No adenopathy or thyromegaly. EDER. Lungs are clear, good air entry, no wheezes, rhonchi or rales. S1 and S2 normal, no murmurs, regular rate and rhythm. Abdomen soft without tenderness, guarding, mass or organomegaly. Extremities show no edema, normal peripheral pulses. Neurological is normal, no focal findings. Breast and Pelvic exams are deferred. Assessment/Plan:   Well Woman  lose weight, increase physical activity, routine labs ordered  Diagnoses and all orders for this visit:    1. Well woman exam (no gynecological exam)  -     CBC WITH AUTOMATED DIFF; Future  -     METABOLIC PANEL, COMPREHENSIVE; Future  -     TSH 3RD GENERATION; Future  -     T4, FREE; Future  -     LIPID PANEL; Future    2.  Syncope, unspecified syncope type -- has been having episodes since she was 17 and was told that she would eventually outgrow condition but she is now having nausea and vomiting.  -     REFERRAL TO NEUROLOGY  -     REFERRAL TO CARDIOLOGY    3. Encounter for hepatitis C screening test for low risk patient  -     HEPATITIS C AB; Future    4. Needs flu shot  -     INFLUENZA VIRUS VAC QUAD,SPLIT,PRESV FREE SYRINGE IM    Jose M Mates was counseled on age-appropriate/ guideline-based risk prevention behaviors and screening for a 46y.o. year old   female . We also discussed adjustments in screening based on family history if necessary. Printed instructions for preventative screening guidelines and healthy behaviors given to patient with after visit summary.         lab results and schedule of future lab studies reviewed with patient  reviewed diet, exercise and weight control  cardiovascular risk and specific lipid/LDL goals reviewed  reviewed medications and side effects in detail

## 2022-02-08 NOTE — PROGRESS NOTES
Jono Dinh is a ,  46 y.o. female WHITE/NON- whose Patient's last menstrual period was 2022. was on 2022 who presents for her annual checkup. She is having no significant problems. With regard to the Gardisil vaccine, she is older than the FDA approved age to receive it. Menstrual status:    Her periods are light, moderate in flow. She is using three to five pads or tampons per day, usually regular and last 26-30 days. She denies dysmenorrhea. She reports no premenstrual symptoms. Contraception:    The current method of family planning is none and She declines contraception and counseling. Sexual history:    She  reports being sexually active and has had partner(s) who are male. She reports using the following method of birth control/protection: None. Medical conditions:    Since her last annual GYN exam about two years ago, she has not the following changes in her health history: none. Pap and Mammogram History:    Her most recent Pap smear was 2019 normal/HPV neg    The patient has never had a mammogram.    The patient does not have a family history of breast cancer.     Past Medical History:   Diagnosis Date    Abnormal Pap smear of cervix     Headache     History of cryosurgery 2005    GOVIND I    Neurocardiogenic syncope     Psychiatric problem     depression     Past Surgical History:   Procedure Laterality Date    COLONOSCOPY N/A 2018    COLON[facility]COPY performed by [facility]e [facility] at [facility] HX ABDOMINAL LAPAROSCOPY  10/2009    r/o ectopic    HX DILATION AND EVACUATION  10/2009    [facility]AB    HX GYN  2013    [facility]    HX LEEP PROCEDURE   + 2004    GOVIND II    HX OTHER SURGICAL      HX WISDOM TEETH EXTRACTION           Allergies: Cashew nut   Social History     Socioeconomic History    Marital status:      Spouse name: Not on file    Number of children: Not on file    Years of education: Not on file    Highest education level: Not on file   Occupational History    Not on file   Tobacco Use    Smoking status: Never Smoker    Smokeless tobacco: Never Used   Vaping Use    Vaping Use: Never used   Substance and Sexual Activity    Alcohol use: Not Currently     Alcohol/week: 2.0 standard drinks     Types: 2 Glasses of wine per week     Comment: rarely    Drug use: No    Sexual activity: Yes     Partners: Male     Birth control/protection: None   Other Topics Concern     Service Not Asked    Blood Transfusions Not Asked    Caffeine Concern Not Asked    Occupational Exposure Not Asked    Hobby Hazards Not Asked    Sleep Concern Not Asked    Stress Concern Not Asked    Weight Concern Not Asked    Special Diet Not Asked    Back Care Not Asked    Exercise Not Asked    Bike Helmet Not Asked    Seat Belt Not Asked    Self-Exams Not Asked   Social History Narrative    Not on file     Social Determinants of Health     Financial Resource Strain:     Difficulty of Paying Living Expenses: Not on file   Food Insecurity:     Worried About Running Out of Food in the Last Year: Not on file    Valentina of Food in the Last Year: Not on file   Transportation Needs:     Lack of Transportation (Medical): Not on file    Lack of Transportation (Non-Medical):  Not on file   Physical Activity:     Days of Exercise per Week: Not on file    Minutes of Exercise per Session: Not on file   Stress:     Feeling of Stress : Not on file   Social Connections:     Frequency of Communication with Friends and Family: Not on file    Frequency of Social Gatherings with Friends and Family: Not on file    Attends Confucianism Services: Not on file    Active Member of Clubs or Organizations: Not on file    Attends Club or Organization Meetings: Not on file    Marital Status: Not on file   Intimate Partner Violence:     Fear of Current or Ex-Partner: Not on file    Emotionally Abused: Not on file    Physically Abused: Not on file    Sexually Abused: Not on file   Housing Stability:     Unable to Pay for Housing in the Last Year: Not on file    Number of Places Lived in the Last Year: Not on file    Unstable Housing in the Last Year: Not on file     Tobacco History:  reports that she has never smoked. She has never used smokeless tobacco.  Alcohol Abuse:  reports previous alcohol use of about 2.0 standard drinks of alcohol per week. Drug Abuse:  reports no history of drug use.     Patient Active Problem List   Diagnosis Code    Neurocardiogenic syncope R55       Review of Systems - History obtained from the patient  Constitutional: negative for weight loss, fever, night sweats  HEENT: negative for hearing loss, earache, congestion, snoring, sorethroat  CV: negative for chest pain, palpitations, edema  Resp: negative for cough, shortness of breath, wheezing  GI: negative for change in bowel habits, abdominal pain, black or bloody stools  : negative for frequency, dysuria, hematuria, vaginal discharge  MSK: negative for back pain, joint pain, muscle pain  Breast: negative for breast lumps, nipple discharge, galactorrhea  Skin :negative for itching, rash, hives  Neuro: negative for dizziness, headache, confusion, weakness  Psych: negative for anxiety, depression, change in mood  Heme/lymph: negative for bleeding, bruising, pallor    Physical Exam    Visit Vitals  BP (!) 141/81   Wt 149 lb 12.8 oz (67.9 kg)   LMP 01/18/2022   BMI 24.18 kg/m²       Constitutional  · Appearance: well-nourished, well developed, alert, in no acute distress    HENT  · Head and Face: appears normal    Neck  · Inspection/Palpation: normal appearance, no masses or tenderness  · Lymph Nodes: no lymphadenopathy present  · Thyroid: gland size normal, nontender, no nodules or masses present on palpation    Chest  · Respiratory Effort: breathing normal  · Auscultation: normal breath sounds    Cardiovascular  · Heart:  · Auscultation: regular rate and rhythm without murmur    Breasts  · Inspection of Breasts: breasts symmetrical, no skin changes, no discharge present, nipple appearance normal, no skin retraction present  · Palpation of Breasts and Axillae: no masses present on palpation, no breast tenderness  · Axillary Lymph Nodes: no lymphadenopathy present    Gastrointestinal  · Abdominal Examination: abdomen non-tender to palpation, normal bowel sounds, no masses present  · Liver and spleen: no hepatomegaly present, spleen not palpable  · Hernias: no hernias identified    Genitourinary  · External Genitalia: normal appearance for age, no discharge present, no tenderness present, no inflammatory lesions present, no masses present, no atrophy present  · Vagina: normal vaginal vault without central or paravaginal defects, no discharge present, no inflammatory lesions present, no masses present  · Bladder: non-tender to palpation  · Urethra: appears normal  · Cervix: normal   · Uterus: normal size, shape and consistency  · Adnexa: no adnexal tenderness present, no adnexal masses present  · Perineum: perineum within normal limits, no evidence of trauma, no rashes or skin lesions present  · Anus: anus within normal limits, no hemorrhoids present  · Inguinal Lymph Nodes: no lymphadenopathy present    Skin  · General Inspection: no rash, no lesions identified    Neurologic/Psychiatric  · Mental Status:  · Orientation: grossly oriented to person, place and time  · Mood and Affect: mood normal, affect appropriate    . Assessment:  Routine gynecologic examination  Her current medical status is satisfactory with no evidence of significant gynecologic issues.     Plan:  Counseled re: diet, exercise, healthy lifestyle  Return for yearly wellness visits  Take vits

## 2022-02-10 ENCOUNTER — OFFICE VISIT (OUTPATIENT)
Dept: OBGYN CLINIC | Age: 42
End: 2022-02-10
Payer: COMMERCIAL

## 2022-02-10 VITALS — SYSTOLIC BLOOD PRESSURE: 141 MMHG | BODY MASS INDEX: 24.18 KG/M2 | DIASTOLIC BLOOD PRESSURE: 81 MMHG | WEIGHT: 149.8 LBS

## 2022-02-10 DIAGNOSIS — Z01.419 ENCOUNTER FOR GYNECOLOGICAL EXAMINATION (GENERAL) (ROUTINE) WITHOUT ABNORMAL FINDINGS: Primary | ICD-10-CM

## 2022-02-10 PROCEDURE — 99395 PREV VISIT EST AGE 18-39: CPT | Performed by: OBSTETRICS & GYNECOLOGY

## 2022-02-15 ENCOUNTER — OFFICE VISIT (OUTPATIENT)
Dept: NEUROLOGY | Age: 42
End: 2022-02-15
Payer: COMMERCIAL

## 2022-02-15 VITALS
DIASTOLIC BLOOD PRESSURE: 68 MMHG | SYSTOLIC BLOOD PRESSURE: 118 MMHG | OXYGEN SATURATION: 99 % | RESPIRATION RATE: 18 BRPM | WEIGHT: 148 LBS | HEART RATE: 50 BPM | BODY MASS INDEX: 23.89 KG/M2 | TEMPERATURE: 97.5 F

## 2022-02-15 DIAGNOSIS — R55 VASOVAGAL SYNCOPE: Primary | ICD-10-CM

## 2022-02-15 PROCEDURE — 99215 OFFICE O/P EST HI 40 MIN: CPT | Performed by: PSYCHIATRY & NEUROLOGY

## 2022-02-15 NOTE — PROGRESS NOTES
Neurology Progress Note    Patient ID:  Sandro Hickey  [id number]  46 y.o.  1980      Subjective:   History:  Sandro Hickey is a 47 y.o. 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 (+) nausea (-) vomiting (+) photophobia (+) phonophobia. Ibuprofen did not help. Patient was seen [facility] and was given migraine meds and advise to follow up with neurology. Patient last seen in June 2020 when she got a L occipital nerve block with no more headache. Now patient comes in for fainting spell and was diagnosed with neurocardiogenic syncope when she was 16 yo. Since then, will get episodes 1/ yr, triggered by COVID and stubbing her toe or dehydration. Recent episode was Gage time, when she fell and hit her back and passed out associated with nausea and vomiting.    (+) younger sister and father had similar events. Cardiac work up in the past including tilt table in the 20s was abnormal.        ROS:  Per HPI-  Otherwise the remainder of ROS was negative    Social Hx  Social History     Socioeconomic History    Marital status:    Tobacco Use    Smoking status: Never Smoker    Smokeless tobacco: Never Used   Vaping Use    Vaping Use: Never used   Substance and Sexual Activity    Alcohol use: Not Currently     Alcohol/week: 2.0 standard drinks     Types: 2 Glasses of wine per week     Comment: rarely    Drug use: No    Sexual activity: Yes     Partners: Male     Birth control/protection: None       Meds:  No current outpatient medications on file prior to visit. No current facility-administered medications on file prior to visit.        Imaging:    CT Results (recent):  Results from Hospital Encounter encounter on 08/01/16    CT HEAD WO CONT    Narrative  **Final Report**      ICD Codes / Adm. Diagnosis: 280450   / Head Injury  Head Trauma  Examination:  CT HEAD WO CON  - [id number] - Aug  1 2016  1:40PM  Accession No:  [id number]  Reason:  head injury      REPORT:  EXAM:  CT HEAD WO CON    INDICATION:   head injury    COMPARISON: None. TECHNIQUE: Unenhanced CT of the head was performed using 5 mm images. Brain  and bone windows were generated. CT dose reduction was achieved through use  of a standardized protocol tailored for this examination and automatic  exposure control for dose modulation. Adaptive statistical iterative  reconstruction (ASIR) was utilized. FINDINGS:  The ventricles and sulci are normal in size, shape and configuration and  midline. There is no significant white matter disease. There is no  intracranial hemorrhage, extra-axial collection, mass, mass effect or  midline shift. The basilar cisterns are open. No acute infarct is  identified. The bone windows demonstrate no abnormalities. The visualized  portions of the paranasal sinuses and mastoid air cells are clear. Impression  : No acute abnormality identified          Signing/Reading Doctor: Nadir Anderson ([id number])  Approved: Nadir Anderson ([id number])  Aug  1 2016  2:01PM      MRI Results (recent):  No results found for this or any previous visit. IR Results (recent):  No results found for this or any previous visit. VAS/US Results (recent):  No results found for this or any previous visit. Reviewed records in [employer] and [facility] tab today    Lab Review     No visits with results within 3 Month(s) from this visit.    Latest known visit with results is:   Lab Only on 03/17/2021   Component Date Value Ref Range Status    Beta HCG, QT 03/17/2021 2  0 - 6 MIU/ML Final    Comment:   Gestational Age hCG       mIU/mL (IU/L)    0.2-1 WEEK                  5-50  1-2 WEEKS                     2-3 WEEKS                   100-5,000  3-4 WEEKS 500-10,000  4-5 WEEKS                   1,000-50,000  5-6 WEEKS                   10,000-100,000  6-8 WEEKS                   15,000-200,000  2-3 MONTHS                  10,000-100,000    The pregnancy tests performed at this institution are intended for use in  diagnosing pregnancy only. They are not intended for use in screening  for/monitoring of hCG as a tumor marker. If a hCG producing tumor is in the  differential diagnoses, recommend ordering test specific for hCG as a tumor  marker. The results of hCG testing should be interpreted in conjunction with the  clinical setting. Falsely elevated or decreased values may be seen in patients  with human anti-mouse antibodies . Consistently elevated hCG levels may be due to heterophilic antibodies or to  nonspecific protein binding. Elevated levels                            of hCG can occur in other clinical  settings such as abnormal physiologic states . Consider requesting hCG be quantitated by an alternative method as listed  above, if the hCG results do not fit the clinical setting. Objective:       Exam:  Visit Vitals  /68 (BP 1 Location: Left arm, BP Patient Position: Sitting, BP Cuff Size: Adult)   Pulse (!) 50   Temp 97.5 °F (36.4 °C) (Temporal)   Resp 18   Wt 67.1 kg (148 lb)   SpO2 99%   BMI 23.89 kg/m²     Gen: Awake, alert, follows commands  Appropriate appearance, normal speech. Oriented to all spheres. No visual field defect on confrontation exam.  Full eyes movement, with no nystagmus, no diplopia, no ptosis. Normal gag and swallow. All remaining cranial nerves were normal  Motor function: 5/5 in all extremities  Sensory: intact to LT, PP and JPS  Good FTN and HTS   Gait: Normal    Assessment:       ICD-10-CM ICD-9-CM    1. Vasovagal syncope  R55 780.2        Considerations include L occipital neuralgia, cervicogenic headache and atypical migraine, intractable.     Plan:   1) Discussed with patient the pathophysiology of syncope and the need to avoid circumstances that can trigger symptoms (quickly standing from sitting position, eating heavy meals)  2) Went over with patient a list of things to do to prevent an event ( Drink 2-2.5 L fluids/day, increase salt intake 3-5 g per day, avoid alcohol and large meals, perform lower extremity exercises, on bad days drink 500 cc water quickly, waist high elastic support stockings, physical counter maneuvers)  3) Regularly monitor BP specially during an event  4) Offered doing ANS testing. Patient declined for now      Follow-up and Dispositions    · Return if symptoms worsen or fail to improve.                Armando Avelar MD  Diplomate, American Board of Psychiatry and Neurology  Diplomate, Neuromuscular Medicine  Diplomate, American Board of Electrodiagnostic Medicine

## 2022-02-15 NOTE — PROGRESS NOTES
Chief Complaint   Patient presents with    Follow-up     Follow up neurogenic cardiac syncope; notes syncopal episodes with vomiting with return of consiousness; notes having tilt table test done twice     Visit Vitals  /68 (BP 1 Location: Left arm, BP Patient Position: Sitting, BP Cuff Size: Adult)   Pulse (!) 50   Temp 97.5 °F (36.4 °C) (Temporal)   Resp 18   Wt 67.1 kg (148 lb)   LMP 01/18/2022   SpO2 99%   BMI 23.89 kg/m²

## 2022-02-15 NOTE — LETTER
2/15/2022    Patient: Frida Miller   YOB: 1980   Date of Visit: 2/15/2022     Alcides Estrada  [address] [place]  [place] [address], [address] [address]  [place] [facility] [address]    Dear MD Karen Estrada NP,      Thank you for referring Ms. Frida Miller to [facility] for evaluation. My notes for this consultation are attached. If you have questions, please do not hesitate to call me. I look forward to following your patient along with you.       Sincerely,    Armando Avelar MD

## 2023-01-24 ENCOUNTER — VIRTUAL VISIT (OUTPATIENT)
Dept: INTERNAL MEDICINE CLINIC | Age: 43
End: 2023-01-24
Payer: COMMERCIAL

## 2023-01-24 DIAGNOSIS — F33.0 MILD EPISODE OF RECURRENT MAJOR DEPRESSIVE DISORDER (HCC): Primary | ICD-10-CM

## 2023-01-24 PROCEDURE — 99214 OFFICE O/P EST MOD 30 MIN: CPT | Performed by: INTERNAL MEDICINE

## 2023-01-24 RX ORDER — SERTRALINE HYDROCHLORIDE 25 MG/1
25 TABLET, FILM COATED ORAL DAILY
Qty: 90 TABLET | Refills: 3 | Status: SHIPPED | OUTPATIENT
Start: 2023-01-24

## 2023-01-24 NOTE — PROGRESS NOTES
Consent: Josette Perry, who was seen by synchronous (real-time) audio-video technology, and/or her healthcare decision maker, is aware that this patient-initiated, Telehealth encounter on 2023 is a billable service, with coverage as determined by her insurance carrier. She is aware that she may receive a bill and has provided verbal consent to proceed: Yes. I was in the office while conducting this encounter. Patient identification was verified at the start of the visit: YES  This visit was done with doxy. me  The patient was located at home in a state where the provider was licensed to provide care. Patient was located at HOME      Note   Chief Complaint   depression    Josette Perry is a 38 y.o. female     1. Mild episode of recurrent major depressive disorder [facility])  Assessment & Plan:   Symptoms ongoing for months  Was managing with exercise  Hasn't wanted to get out of bed, not feeling tra   Brother had been in the hospital since august and  yesterday  Has been on zoloft before and it worked well for her  No SI  Start zoloft 25mg daily. Will send counseling packet through email  Orders:  -     sertraline (ZOLOFT) 25 mg tablet; Take 1 Tablet by mouth daily. Indications: depression, Normal, Disp-90 Tablet, R-3           We discussed the expected course, resolution and complications of the diagnosis(es) in detail. Medication risks, benefits, costs, interactions, and alternatives were discussed as indicated. I advised her to contact the office if her condition worsens, changes or fails to improve as anticipated. She expressed understanding with the diagnosis(es) and plan.      Return to clinic:  6 weeks for physical, follow up    Zachary Batres MD  [facility]  2023    Future Appointments   Date Time Provider Kofi Carrillo   2/15/2023  2:20 PM RADHA APPIAH [facility] BS AMB   2/15/2023  2:40 PM Kallie May MD [facility] BS AMB   3/6/2023  2:30 PM Soha Hassan MD [facility] BS AMB        Objective   Vitals:       Patient-Reported Vitals 1/24/2023   Patient-Reported Weight 148                 Physical Exam  Constitutional:       Appearance: Normal appearance. She is not ill-appearing. Pulmonary:      Effort: No respiratory distress. Neurological:      Mental Status: She is alert. Current Outpatient Medications   Medication Sig    sertraline (ZOLOFT) 25 mg tablet Take 1 Tablet by mouth daily. Indications: depression     No current facility-administered medications for this visit. Antoni Christianson is a 38 y.o. female being evaluated by a video visit encounter for concerns as above. A caregiver was present when appropriate. Due to this being a TeleHealth encounter (During [facility]- public health emergency), evaluation of the following organ systems was limited: Vitals/Constitutional/EENT/Resp/CV/GI//MS/Neuro/Skin/Heme-Lymph-Imm. Pursuant to the emergency declaration under the [facility]5 waiver authority and the [employer] Act, this Virtual  Visit was conducted, with patient's (and/or legal guardian's) consent, to reduce the patient's risk of exposure to COVID-19 and provide necessary medical care. Services were provided through a video synchronous discussion virtually to substitute for in-person clinic visit.

## 2023-01-24 NOTE — ASSESSMENT & PLAN NOTE
Symptoms ongoing for months  Was managing with exercise  Hasn't wanted to get out of bed, not feeling tra   Brother had been in the hospital since august and  yesterday  Has been on zoloft before and it worked well for her  No SI  Start zoloft 25mg daily.  Will send counseling packet through email

## 2023-02-06 NOTE — PROGRESS NOTES
Thor Chen is a 38 y.o. female returns for an annual exam     Chief Complaint   Patient presents with    Well Woman       Patient's last menstrual period was 02/01/2023 (approximate). Her periods are normal in flow and usually regular with a 26-32 day interval with 3-7 day duration. She does not have dysmenorrhea. Problems: no significant problems  Birth Control: coitus interruptus. Last Pap: 06/19/2019 normal/HPV neg  She does not have a history of GOVIND 2, 3 or cervical cancer. Last Mammogram: was not done today due to breastfeeding  Last Bone Density: none on file  Last colonoscopy: see report obtained 5 year(s) ago. 1. Have you been to the ER, urgent care clinic, or hospitalized since your last visit? No    2. Have you seen or consulted any other health care providers outside of the [address] [place] [address] since your last visit?  No    Examination chaperoned by Jaswant Marrero RN

## 2023-02-15 ENCOUNTER — OFFICE VISIT (OUTPATIENT)
Dept: OBGYN CLINIC | Age: 43
End: 2023-02-15

## 2023-02-15 VITALS — SYSTOLIC BLOOD PRESSURE: 129 MMHG | DIASTOLIC BLOOD PRESSURE: 83 MMHG | WEIGHT: 152.6 LBS | BODY MASS INDEX: 24.63 KG/M2

## 2023-02-15 DIAGNOSIS — Z01.419 ENCOUNTER FOR GYNECOLOGICAL EXAMINATION (GENERAL) (ROUTINE) WITHOUT ABNORMAL FINDINGS: Primary | ICD-10-CM

## 2023-02-15 PROCEDURE — 99396 PREV VISIT EST AGE 40-64: CPT | Performed by: OBSTETRICS & GYNECOLOGY

## 2023-02-15 NOTE — PROGRESS NOTES
Farhat Foster is a ,  38 y.o. female [address] [facility] [place] whose LMP was on 2023 who presents for her annual checkup. She is having no significant problems. Menstrual status:    Her periods are moderate in flow, regular    She denies dysmenorrhea. Contraception:    The current method of family planning is none. Sexual history:    She  reports being sexually active and has had partner(s) who are male. She reports using the following method of birth control/protection: None. Pap and Mammogram History:  Last Pap: 2019 normal/HPV neg  She does not have a history of GOVIND 2, 3 or cervical cancer. Last Mammogram: was not done today due to breastfeeding - just stopped a month ago  Last Bone Density: none on file  Last colonoscopy: see report obtained 5 year(s) ago. Breast Cancer History    She has no family history of breast cancer.     Family History   Problem Relation Age of Onset    Hypertension Mother     Elevated Lipids Mother     Colon Polyps Mother     Hypertension Father     Elevated Lipids Father     Colon Polyps Father     Colon Polyps Sister     Bleeding Prob Brother         ITP    Cancer Maternal Grandmother         colon cancer    Cancer Paternal Grandmother         colon cancer    Heart Disease Paternal Grandfather     Hypertension Paternal Grandfather     No Known Problems Sister     No Known Problems Brother     No Known Problems Daughter     No Known Problems Son        Past Medical History:   Diagnosis Date    Abnormal Pap smear of cervix     Headache     History of cryosurgery 2005    GOVIND I    Neurocardiogenic syncope     Psychiatric problem     depression     Past Surgical History:   Procedure Laterality Date    COLONOSCOPY N/A 2018    [facility] Buckley Essex[facility]    HX ABDOMINAL LAPAROSCOPY  10/2009    r/o ectopic    HX DILATION AND EVACUATION  10/2009    [facility]    HX GYN  2013    [facility]    HX LEEP PROCEDURE   + 2004 GOVIND II    HX OTHER SURGICAL      HX WISDOM TEETH EXTRACTION       Current Outpatient Medications   Medication Sig Dispense Refill    sertraline (ZOLOFT) 25 mg tablet Take 1 Tablet by mouth daily. Indications: depression 90 Tablet 3     Allergies: Cashew nut   Social History     Socioeconomic History    Marital status:      Spouse name: Not on file    Number of children: Not on file    Years of education: Not on file    Highest education level: Not on file   Occupational History    Not on file   Tobacco Use    Smoking status: Never    Smokeless tobacco: Never   Vaping Use    Vaping Use: Never used   Substance and Sexual Activity    Alcohol use: Not Currently     Alcohol/week: 2.0 standard drinks     Types: 2 Glasses of wine per week     Comment: rarely    Drug use: No    Sexual activity: Yes     Partners: Male     Birth control/protection: None   Other Topics Concern     Service Not Asked    Blood Transfusions Not Asked    Caffeine Concern Not Asked    Occupational Exposure Not Asked    Hobby Hazards Not Asked    Sleep Concern Not Asked    Stress Concern Not Asked    Weight Concern Not Asked    Special Diet Not Asked    Back Care Not Asked    Exercise Not Asked    Bike Helmet Not Asked    Seat Belt Not Asked    Self-Exams Not Asked   Social History Narrative    Not on file     Social Determinants of Health     Financial Resource Strain: Not on file   Food Insecurity: Not on file   Transportation Needs: Not on file   Physical Activity: Not on file   Stress: Not on file   Social Connections: Not on file   Intimate Partner Violence: Not on file   Housing Stability: Not on file     Tobacco History:  reports that she has never smoked. She has never used smokeless tobacco.  Alcohol Abuse:  reports that she does not currently use alcohol after a past usage of about 2.0 standard drinks per week. Drug Abuse:  reports no history of drug use.   Patient Active Problem List   Diagnosis Code    Neurocardiogenic syncope R55    Mild episode of recurrent major depressive disorder ([facility] [address] F33.0         Review of Systems - History obtained from the patient  Constitutional: negative for weight loss, fever, night sweats  HEENT: negative for hearing loss, earache, congestion, snoring, sorethroat  CV: negative for chest pain, palpitations, edema  Resp: negative for cough, shortness of breath, wheezing  GI: negative for change in bowel habits, abdominal pain, black or bloody stools  : negative for frequency, dysuria, hematuria, vaginal discharge  MSK: negative for back pain, joint pain, muscle pain  Breast: negative for breast lumps, nipple discharge, galactorrhea  Skin :negative for itching, rash, hives  Neuro: negative for dizziness, headache, confusion, weakness  Psych: negative for anxiety, depression, change in mood  Heme/lymph: negative for bleeding, bruising, pallor    Physical Exam    Visit Vitals  /83   Wt 152 lb 9.6 oz (69.2 kg)   LMP 02/01/2023 (Approximate)   Breastfeeding No   BMI 24.63 kg/m²     Constitutional  Appearance: well-nourished, well developed, alert, in no acute distress    HENT  Head and Face: appears normal    Neck  Inspection/Palpation: normal appearance, no masses or tenderness  Lymph Nodes: no lymphadenopathy present  Thyroid: gland size normal, nontender, no nodules or masses present on palpation    Chest  Respiratory Effort: breathing normal  Auscultation: normal breath sounds    Cardiovascular  Heart:   Auscultation: regular rate and rhythm without murmur    Breasts  Inspection of Breasts: breasts symmetrical, no skin changes, no discharge present, nipple appearance normal, no skin retraction present  Palpation of Breasts and Axillae: no masses present on palpation, no breast tenderness  Axillary Lymph Nodes: no lymphadenopathy present    Gastrointestinal  Abdominal Examination: abdomen non-tender to palpation, normal bowel sounds, no masses present  Liver and spleen: no hepatomegaly present, spleen not palpable  Hernias: no hernias identified    Skin  General Inspection: no rash, no lesions identified    Neurologic/Psychiatric  Mental Status:  Orientation: grossly oriented to person, place and time  Mood and Affect: mood normal, affect appropriate    Genitourinary  External Genitalia: normal appearance for age, no discharge present, no tenderness present, no inflammatory lesions present, no masses present, no atrophy present  Vagina: normal vaginal vault without central or paravaginal defects, no discharge present, no inflammatory lesions present, no masses present  Bladder: non-tender to palpation  Urethra: appears normal  Cervix: normal   Uterus: normal size, shape and consistency  Adnexa: no adnexal tenderness present, no adnexal masses present  Perineum: perineum within normal limits, no evidence of trauma, no rashes or skin lesions present  Anus: anus within normal limits, no hemorrhoids present  Inguinal Lymph Nodes: no lymphadenopathy present    Assessment:  Routine gynecologic examination  Her current medical status is satisfactory with no evidence of significant gynecologic issues.     Plan:  Counseled re: diet, exercise, healthy lifestyle  Return for yearly wellness visits  Rec annual mammogram  Pap/HPV

## 2023-02-20 ENCOUNTER — TELEPHONE (OUTPATIENT)
Dept: INTERNAL MEDICINE CLINIC | Age: 43
End: 2023-02-20

## 2023-02-21 DIAGNOSIS — F33.0 MILD EPISODE OF RECURRENT MAJOR DEPRESSIVE DISORDER (HCC): ICD-10-CM

## 2023-02-21 RX ORDER — SERTRALINE HYDROCHLORIDE 50 MG/1
25 TABLET, FILM COATED ORAL DAILY
Qty: 30 TABLET | Refills: 1 | Status: SHIPPED | OUTPATIENT
Start: 2023-02-21

## 2023-03-15 ENCOUNTER — OFFICE VISIT (OUTPATIENT)
Dept: INTERNAL MEDICINE CLINIC | Age: 43
End: 2023-03-15
Payer: COMMERCIAL

## 2023-03-15 VITALS
RESPIRATION RATE: 14 BRPM | HEART RATE: 53 BPM | WEIGHT: 152 LBS | OXYGEN SATURATION: 99 % | SYSTOLIC BLOOD PRESSURE: 108 MMHG | DIASTOLIC BLOOD PRESSURE: 61 MMHG | BODY MASS INDEX: 24.43 KG/M2 | HEIGHT: 66 IN | TEMPERATURE: 97.7 F

## 2023-03-15 DIAGNOSIS — Z11.59 NEED FOR HEPATITIS C SCREENING TEST: ICD-10-CM

## 2023-03-15 DIAGNOSIS — Z00.00 WELL ADULT EXAM: Primary | ICD-10-CM

## 2023-03-15 DIAGNOSIS — R53.83 FATIGUE, UNSPECIFIED TYPE: ICD-10-CM

## 2023-03-15 DIAGNOSIS — F33.0 MILD EPISODE OF RECURRENT MAJOR DEPRESSIVE DISORDER (HCC): ICD-10-CM

## 2023-03-15 PROCEDURE — 99396 PREV VISIT EST AGE 40-64: CPT | Performed by: INTERNAL MEDICINE

## 2023-03-15 RX ORDER — SERTRALINE HYDROCHLORIDE 25 MG/1
25 TABLET, FILM COATED ORAL DAILY
Qty: 90 TABLET | Refills: 3 | Status: SHIPPED | OUTPATIENT
Start: 2023-03-15

## 2023-03-15 NOTE — ASSESSMENT & PLAN NOTE
Reports noting increased fatigue  No CP, SOB, bleeding  Doesn't get deep sleep  Has a young child  Likely multifactorial, check labs

## 2023-03-15 NOTE — ASSESSMENT & PLAN NOTE
Planning to get mammogram in august  Colonoscopy in may   Discussed recent covid booster  Encourage healthy habits

## 2023-03-15 NOTE — ASSESSMENT & PLAN NOTE
Started on sertraline 25 last visit   Noting a difference   Not as down   Wants to stay at 25 and see how it goes, rx sent

## 2023-03-15 NOTE — PROGRESS NOTES
Assessment and Plan     1. Well adult exam  Assessment & Plan:  Planning to get mammogram in august  Colonoscopy in may   Discussed recent covid booster  Encourage healthy habits  Orders:  -     HEMOGLOBIN A1C WITH EAG; Future  -     LIPID PANEL; Future  2. Fatigue, unspecified type  Assessment & Plan:  Reports noting increased fatigue  No CP, SOB, bleeding  Doesn't get deep sleep  Has a young child  Likely multifactorial, check labs  Orders:  -     CBC WITH AUTOMATED DIFF; Future  -     METABOLIC PANEL, COMPREHENSIVE; Future  -     TSH 3RD GENERATION; Future  -     T4, FREE; Future  -     VITAMIN D, 25 HYDROXY; Future  -     FERRITIN; Future  -     IRON PROFILE; Future  -     VITAMIN B12 & FOLATE; Future  3. Need for hepatitis C screening test  -     HEPATITIS C AB; Future  4. Mild episode of recurrent major depressive disorder [facility])  Assessment & Plan:  Started on sertraline 25 last visit   Noting a difference   Not as down   Wants to stay at 25 and see how it goes, rx sent  Orders:  -     sertraline (ZOLOFT) 25 mg tablet; Take 1 Tablet by mouth daily. Indications: depression, Normal, Disp-90 Tablet, R-3       Benefits, risks, possible drug interactions, and side effects of all new medications were reviewed with the patient. Pt verbalized understanding. Return to clinic:  6mo with PCP for depression    An electronic signature was used to authenticate this note. Marilee Bower MD  [facility]  3/15/2023    Future Appointments   Date Time Provider Kofi Carrillo   8/15/2023  9:00 AM RADHA APPIAH [facility] BS AMB   9/13/2023  1:45 PM Zeeshan Dc MD [facility] BS AMB        History of Present Illness   Chief Complaint   physical    Juan Miguel Mccarthy is a 38 y.o. female         Review of Systems   Constitutional:  Negative for chills and fever. HENT:  Negative for hearing loss. Eyes:  Negative for blurred vision. Respiratory:  Negative for shortness of breath. Cardiovascular:  Negative for chest pain. Gastrointestinal:  Negative for abdominal pain, blood in stool, constipation, diarrhea, melena, nausea and vomiting. Genitourinary:  Negative for dysuria and hematuria. Musculoskeletal:  Negative for joint pain. Skin:  Negative for rash. Neurological:  Negative for headaches. Past Medical History     Allergies   Allergen Reactions    Cashew Nut Itching     Pt states she is only allergic to cashew nuts no other kind        Current Outpatient Medications   Medication Sig    sertraline (ZOLOFT) 25 mg tablet Take 1 Tablet by mouth daily. Indications: depression     No current facility-administered medications for this visit.           Patient Active Problem List   Diagnosis Code    Neurocardiogenic syncope R55    Mild episode of recurrent major depressive disorder ([facility] [address] F33.0    Well adult exam Z00.00    Fatigue, unspecified type R53.83     Past Surgical History:   Procedure Laterality Date    COLONOSCOPY N/A 1/19/2018    COLONOSCOPY performed by Regulo De La Torre MD at [facility]    HX ABDOMINAL LAPAROSCOPY  10/2009    r/o ectopic    HX DILATION AND EVACUATION  10/2009    [facility]    HX GYN  08/05/2013    [facility]    HX LEEP PROCEDURE  2001 + 5/2004    GOVIND II    HX OTHER SURGICAL      HX WISDOM TEETH EXTRACTION        Social History     Tobacco Use    Smoking status: Never    Smokeless tobacco: Never   Substance Use Topics    Alcohol use: Not Currently     Comment: rarely      Family History   Problem Relation Age of Onset    Hypertension Mother     Elevated Lipids Mother     Colon Polyps Mother     Hypertension Father     Elevated Lipids Father     Colon Polyps Father     Colon Polyps Sister     No Known Problems Sister     Bleeding Prob Brother         ITP    Other Brother         Zollinger-Rasheed syndrom    No Known Problems Brother     Cancer Maternal Grandmother         colon cancer    Cancer Paternal Grandmother         colon cancer    Heart Disease Paternal Grandfather     Hypertension Paternal Grandfather     No Known Problems Daughter     No Known Problems Son         Physical Exam   Vitals:       Visit Vitals  /61 (BP 1 Location: Left upper arm, BP Patient Position: Sitting, BP Cuff Size: Small adult)   Pulse (!) 53   Temp 97.7 °F (36.5 °C) (Oral)   Resp 14   Ht 5' 6\" (1.676 m)   Wt 152 lb (68.9 kg)   LMP 02/28/2023   SpO2 99%   BMI 24.53 kg/m²        Physical Exam  Constitutional:       General: She is not in acute distress. Appearance: She is well-developed. HENT:      Right Ear: Tympanic membrane, ear canal and external ear normal.      Left Ear: Tympanic membrane, ear canal and external ear normal.      Mouth/Throat:      Mouth: Mucous membranes are moist.      Pharynx: No posterior oropharyngeal erythema. Eyes:      Extraocular Movements: Extraocular movements intact. Conjunctiva/sclera: Conjunctivae normal.   Cardiovascular:      Rate and Rhythm: Normal rate and regular rhythm. Pulses: Normal pulses. Heart sounds: No murmur heard. No friction rub. No gallop. Pulmonary:      Effort: No respiratory distress. Breath sounds: No wheezing, rhonchi or rales. Abdominal:      General: Bowel sounds are normal. There is no distension. Palpations: Abdomen is soft. There is no hepatomegaly, splenomegaly or mass. Tenderness: There is no abdominal tenderness. There is no guarding. Musculoskeletal:      Cervical back: Neck supple. Right lower leg: No edema. Left lower leg: No edema. Lymphadenopathy:      Cervical: No cervical adenopathy. Skin:     General: Skin is warm. Findings: No rash. Neurological:      Mental Status: She is alert.

## 2023-03-28 ENCOUNTER — OFFICE VISIT (OUTPATIENT)
Dept: INTERNAL MEDICINE CLINIC | Age: 43
End: 2023-03-28
Payer: COMMERCIAL

## 2023-03-28 ENCOUNTER — TELEPHONE (OUTPATIENT)
Dept: INTERNAL MEDICINE CLINIC | Age: 43
End: 2023-03-28

## 2023-03-28 VITALS
TEMPERATURE: 98.1 F | HEART RATE: 61 BPM | BODY MASS INDEX: 24.11 KG/M2 | DIASTOLIC BLOOD PRESSURE: 74 MMHG | SYSTOLIC BLOOD PRESSURE: 116 MMHG | HEIGHT: 66 IN | OXYGEN SATURATION: 99 % | WEIGHT: 150 LBS | RESPIRATION RATE: 16 BRPM

## 2023-03-28 DIAGNOSIS — R09.81 NASAL CONGESTION: ICD-10-CM

## 2023-03-28 DIAGNOSIS — J06.9 URI, ACUTE: ICD-10-CM

## 2023-03-28 DIAGNOSIS — J02.9 SORE THROAT: Primary | ICD-10-CM

## 2023-03-28 LAB
S PYO AG THROAT QL: NEGATIVE
SARS-COV-2 POC: NEGATIVE
VALID INTERNAL CONTROL?: YES

## 2023-03-28 PROCEDURE — 87426 SARSCOV CORONAVIRUS AG IA: CPT | Performed by: INTERNAL MEDICINE

## 2023-03-28 PROCEDURE — 87880 STREP A ASSAY W/OPTIC: CPT | Performed by: INTERNAL MEDICINE

## 2023-03-28 PROCEDURE — 99213 OFFICE O/P EST LOW 20 MIN: CPT | Performed by: INTERNAL MEDICINE

## 2023-03-28 RX ORDER — AZITHROMYCIN 250 MG/1
TABLET, FILM COATED ORAL
Qty: 6 TABLET | Refills: 0 | Status: SHIPPED | OUTPATIENT
Start: 2023-03-28

## 2023-03-28 NOTE — TELEPHONE ENCOUNTER
Identified patient by name and date of birth. Chief Complaint   Patient presents with    Cold Symptoms     Started 5 days ago, Swollen glands x1 day, no fever. Cold like symptoms started 5 days ago, swollen glands near jaw line x1 day ago. Pt reports epistaxis, nasal congestion, sinus pain, and sore throat, having intermittent sinus headaches. No fever, no nausea, vomiting, diarrhea. Pt reports having on and off sinus headaches. Would like to be seen by PCP if possible. Allergies   Allergen Reactions    Cashew Nut Itching     Pt states she is only allergic to cashew nuts no other kind       Current Outpatient Medications   Medication Instructions    sertraline (ZOLOFT) 25 mg, Oral, DAILY       Past Medical History:   Diagnosis Date    Abnormal Pap smear of cervix     Headache     History of cryosurgery 09/2005    GOVIND I    Neurocardiogenic syncope     Psychiatric problem     depression    Routine Papanicolaou smear 02/15/2023    NILM, HPV negative       Made appt to be seen by PCP. No further concerns at this time.      Signed By: Lawrence Mcghee     March 28, 2023 8:18 AM

## 2023-03-28 NOTE — PROGRESS NOTES
HISTORY OF PRESENT ILLNESS  Diann Gotti is a 38 y.o. female. HPI  Seen with 5 days of URI symptoms congestion headache sore glands in the last 24 hours no fevers purulent phlegm or shortness of breath cough minimally productive some congestion of nose and a little nosebleed recently. Started Zyrtec no other over-the-counter meds. Review of Systems   Constitutional:  Positive for malaise/fatigue. Negative for chills, diaphoresis and fever. HENT:  Positive for congestion and sore throat. Negative for ear discharge, ear pain and nosebleeds. Eyes:  Negative for pain and discharge. Respiratory:  Positive for cough. Negative for hemoptysis, sputum production, shortness of breath and wheezing. Cardiovascular:  Negative for chest pain. Neurological:  Negative for headaches. Physical Exam  Vitals and nursing note reviewed. Constitutional:       Appearance: She is well-developed. HENT:      Head: Normocephalic. Right Ear: Tympanic membrane, ear canal and external ear normal.      Left Ear: Tympanic membrane, ear canal and external ear normal.      Nose: Nose normal.      Mouth/Throat:      Pharynx: No oropharyngeal exudate. Eyes:      General:         Right eye: No discharge. Left eye: No discharge. Conjunctiva/sclera: Conjunctivae normal.      Pupils: Pupils are equal, round, and reactive to light. Cardiovascular:      Rate and Rhythm: Normal rate and regular rhythm. Heart sounds: Normal heart sounds. Pulmonary:      Effort: Pulmonary effort is normal. No respiratory distress. Breath sounds: Normal breath sounds. No wheezing or rales. Musculoskeletal:      Cervical back: Normal range of motion and neck supple. Lymphadenopathy:      Cervical: No cervical adenopathy. ASSESSMENT and PLAN  Diagnoses and all orders for this visit:    1. Sore throat  -     AMB POC RAPID STREP A    2.  Nasal congestion  -     AMB POC SARS-COV-2    3. URI, acute  -     azithromycin (ZITHROMAX) 250 mg tablet; Per pack      the following changes in treatment are made: add sudafed qam and flonase  If not improving in 48 hours add abx  Strep and covid neg

## 2023-04-14 PROBLEM — Z00.00 WELL ADULT EXAM: Status: RESOLVED | Noted: 2023-03-15 | Resolved: 2023-04-14

## 2023-05-26 RX ORDER — AZITHROMYCIN 250 MG/1
TABLET, FILM COATED ORAL
COMMUNITY
Start: 2023-03-28

## 2023-05-26 RX ORDER — SERTRALINE HYDROCHLORIDE 25 MG/1
25 TABLET, FILM COATED ORAL DAILY
COMMUNITY
Start: 2023-03-15

## 2023-06-30 LAB
FERRITIN SERPL-MCNC: 18 NG/ML (ref 8–252)
FOLATE SERPL-MCNC: 8.6 NG/ML (ref 5–21)
T4 FREE SERPL-MCNC: 0.8 NG/DL (ref 0.8–1.5)
TSH SERPL DL<=0.05 MIU/L-ACNC: 2.26 UIU/ML (ref 0.36–3.74)
VIT B12 SERPL-MCNC: 803 PG/ML (ref 193–986)

## 2023-07-01 LAB
25(OH)D3 SERPL-MCNC: 53.1 NG/ML (ref 30–100)
ALBUMIN SERPL-MCNC: 3.9 G/DL (ref 3.5–5)
ALBUMIN/GLOB SERPL: 1.3 (ref 1.1–2.2)
ALP SERPL-CCNC: 55 U/L (ref 45–117)
ALT SERPL-CCNC: 16 U/L (ref 12–78)
ANION GAP SERPL CALC-SCNC: 4 MMOL/L (ref 5–15)
AST SERPL-CCNC: 15 U/L (ref 15–37)
BASOPHILS # BLD: 0.1 K/UL (ref 0–0.1)
BASOPHILS NFR BLD: 1 % (ref 0–1)
BILIRUB SERPL-MCNC: 0.5 MG/DL (ref 0.2–1)
BUN SERPL-MCNC: 18 MG/DL (ref 6–20)
BUN/CREAT SERPL: 21 (ref 12–20)
CALCIUM SERPL-MCNC: 9.4 MG/DL (ref 8.5–10.1)
CHLORIDE SERPL-SCNC: 107 MMOL/L (ref 97–108)
CHOLEST SERPL-MCNC: 167 MG/DL
CO2 SERPL-SCNC: 29 MMOL/L (ref 21–32)
COMMENT:: NORMAL
CREAT SERPL-MCNC: 0.87 MG/DL (ref 0.55–1.02)
DIFFERENTIAL METHOD BLD: NORMAL
EOSINOPHIL # BLD: 0.4 K/UL (ref 0–0.4)
EOSINOPHIL NFR BLD: 6 % (ref 0–7)
ERYTHROCYTE [DISTWIDTH] IN BLOOD BY AUTOMATED COUNT: 12.5 % (ref 11.5–14.5)
EST. AVERAGE GLUCOSE BLD GHB EST-MCNC: 85 MG/DL
GLOBULIN SER CALC-MCNC: 3.1 G/DL (ref 2–4)
GLUCOSE SERPL-MCNC: 87 MG/DL (ref 65–100)
HBA1C MFR BLD: 4.6 % (ref 4–5.6)
HCT VFR BLD AUTO: 38.3 % (ref 35–47)
HCV AB SERPL QL IA: NONREACTIVE
HDLC SERPL-MCNC: 71 MG/DL
HDLC SERPL: 2.4 (ref 0–5)
HGB BLD-MCNC: 12.1 G/DL (ref 11.5–16)
IMM GRANULOCYTES # BLD AUTO: 0 K/UL (ref 0–0.04)
IMM GRANULOCYTES NFR BLD AUTO: 0 % (ref 0–0.5)
IRON SATN MFR SERPL: 27 % (ref 20–50)
IRON SERPL-MCNC: 85 UG/DL (ref 35–150)
LDLC SERPL CALC-MCNC: 79 MG/DL (ref 0–100)
LYMPHOCYTES # BLD: 2.3 K/UL (ref 0.8–3.5)
LYMPHOCYTES NFR BLD: 37 % (ref 12–49)
MCH RBC QN AUTO: 28.9 PG (ref 26–34)
MCHC RBC AUTO-ENTMCNC: 31.6 G/DL (ref 30–36.5)
MCV RBC AUTO: 91.6 FL (ref 80–99)
MONOCYTES # BLD: 0.4 K/UL (ref 0–1)
MONOCYTES NFR BLD: 7 % (ref 5–13)
NEUTS SEG # BLD: 3 K/UL (ref 1.8–8)
NEUTS SEG NFR BLD: 49 % (ref 32–75)
NRBC # BLD: 0 K/UL (ref 0–0.01)
NRBC BLD-RTO: 0 PER 100 WBC
PLATELET # BLD AUTO: 360 K/UL (ref 150–400)
PMV BLD AUTO: 9.6 FL (ref 8.9–12.9)
POTASSIUM SERPL-SCNC: 4.3 MMOL/L (ref 3.5–5.1)
PROT SERPL-MCNC: 7 G/DL (ref 6.4–8.2)
RBC # BLD AUTO: 4.18 M/UL (ref 3.8–5.2)
SODIUM SERPL-SCNC: 140 MMOL/L (ref 136–145)
SPECIMEN HOLD: NORMAL
TIBC SERPL-MCNC: 319 UG/DL (ref 250–450)
TRIGL SERPL-MCNC: 85 MG/DL
VLDLC SERPL CALC-MCNC: 17 MG/DL
WBC # BLD AUTO: 6.1 K/UL (ref 3.6–11)

## 2023-09-13 ENCOUNTER — OFFICE VISIT (OUTPATIENT)
Age: 43
End: 2023-09-13
Payer: COMMERCIAL

## 2023-09-13 VITALS
WEIGHT: 154.8 LBS | HEART RATE: 57 BPM | OXYGEN SATURATION: 98 % | SYSTOLIC BLOOD PRESSURE: 124 MMHG | DIASTOLIC BLOOD PRESSURE: 72 MMHG | RESPIRATION RATE: 16 BRPM | HEIGHT: 66 IN | TEMPERATURE: 98.2 F | BODY MASS INDEX: 24.88 KG/M2

## 2023-09-13 DIAGNOSIS — F34.1 DYSTHYMIA: Primary | ICD-10-CM

## 2023-09-13 DIAGNOSIS — M54.41 ACUTE RIGHT-SIDED LOW BACK PAIN WITH RIGHT-SIDED SCIATICA: ICD-10-CM

## 2023-09-13 PROCEDURE — 99213 OFFICE O/P EST LOW 20 MIN: CPT | Performed by: INTERNAL MEDICINE

## 2023-09-13 RX ORDER — SERTRALINE HYDROCHLORIDE 25 MG/1
25 TABLET, FILM COATED ORAL DAILY
Qty: 90 TABLET | Refills: 1 | Status: SHIPPED | OUTPATIENT
Start: 2023-09-13

## 2023-09-13 SDOH — ECONOMIC STABILITY: HOUSING INSECURITY
IN THE LAST 12 MONTHS, WAS THERE A TIME WHEN YOU DID NOT HAVE A STEADY PLACE TO SLEEP OR SLEPT IN A SHELTER (INCLUDING NOW)?: NO

## 2023-09-13 SDOH — ECONOMIC STABILITY: INCOME INSECURITY: HOW HARD IS IT FOR YOU TO PAY FOR THE VERY BASICS LIKE FOOD, HOUSING, MEDICAL CARE, AND HEATING?: NOT VERY HARD

## 2023-09-13 SDOH — ECONOMIC STABILITY: FOOD INSECURITY: WITHIN THE PAST 12 MONTHS, THE FOOD YOU BOUGHT JUST DIDN'T LAST AND YOU DIDN'T HAVE MONEY TO GET MORE.: NEVER TRUE

## 2023-09-13 SDOH — ECONOMIC STABILITY: FOOD INSECURITY: WITHIN THE PAST 12 MONTHS, YOU WORRIED THAT YOUR FOOD WOULD RUN OUT BEFORE YOU GOT MONEY TO BUY MORE.: NEVER TRUE

## 2023-09-13 SDOH — ECONOMIC STABILITY: TRANSPORTATION INSECURITY
IN THE PAST 12 MONTHS, HAS LACK OF TRANSPORTATION KEPT YOU FROM MEETINGS, WORK, OR FROM GETTING THINGS NEEDED FOR DAILY LIVING?: NO

## 2023-09-13 ASSESSMENT — ENCOUNTER SYMPTOMS: BACK PAIN: 1

## 2023-09-13 NOTE — PROGRESS NOTES
Karin Rust (:  1980) is a 41 y.o. female,Established patient, here for evaluation of the following chief complaint(s):  6 Month Follow-Up, Depression, and Back Pain (Patient complains of back pain since last Tuesday, states it feels like a nerve. )         ASSESSMENT/PLAN:  1. Dysthymia  2. Acute right-sided low back pain with right-sided sciatica  -     sertraline (ZOLOFT) 25 MG tablet; Take 1 tablet by mouth daily, Disp-90 tablet, [id number] as stable on this med  Cont  stretches for back  Discussed if sxs worsen instead of improving as current path would advise rto  See me in 6 mo re med      No follow-ups on file. Subjective   SUBJECTIVE/OBJECTIVE:  Depression  Back Pain      Seen for med check. She went back on sertraline 25 mg daily about 6 months ago. It really helps make her emotions more manageable and more even. No side effects particularly no tremor dry mouth or change in libido. Recently has noticed discomfort in right low back with some radiation to right buttock and thigh the radiation has improved and overall the pain is improved with some cat cow stretches. No weakness in feet. Does have a 1year-old but does not think she hurt herself lifting him. Did notice it several hours after doing dead lifts. Review of Systems   Musculoskeletal:  Positive for back pain. Psychiatric/Behavioral:  Positive for depression. Objective   Physical Exam  Vitals and nursing note reviewed. Constitutional:       Appearance: Normal appearance. Musculoskeletal:         General: No deformity. Comments: Able to flex and extend at back and stand on heels and toes   Some discomfort with  flexion but pain localized in right low back   Neurological:      Mental Status: She is alert. An electronic signature was used to authenticate this note.     --Enolia Buerger, MD

## 2024-02-12 NOTE — PROGRESS NOTES
Kaycee Chang is a 43 y.o. female returns for an annual exam     No chief complaint on file.      No LMP recorded.  Her periods are {bleeding vaginal:55481} in flow and {cycle hx:98376}.  She {has-does not have:44264509} dysmenorrhea.  Problems: {problem:67044}  Birth Control: {contraception:486319}.  Last Pap: 2/15/2023 NILM/ HPV-  She does have a history of ZACH 2, 3 or cervical cancer.    HX LEEP PROCEDURE   2001 + 5/2004     ZACH II     Last Mammogram: Was in our office today      Examination chaperoned by Jennifer Schilling LPN.

## 2024-02-13 NOTE — PATIENT INSTRUCTIONS
Patient Education        Pelvic Exam: Care Instructions  Overview     When your doctor checks your pelvic organs, it's called a pelvic exam. This exam is done to evaluate symptoms, such as pelvic pain or abnormal vaginal bleeding and discharge. It may also be done to collect samples of cells for cervical cancer screening.  Before your exam, it's important to share some information with your doctor. You can talk about any concerns you may have. Your doctor will also want to know if you are pregnant or use birth control. And your doctor will want to hear about any problems, surgeries, or procedures you have had in your pelvic area. You will also need to tell your doctor when your last period was. Be sure to tell your doctor if there is anything that can be done to help you feel safe during the exam.  Follow-up care is a key part of your treatment and safety. Be sure to make and go to all appointments, and call your doctor if you are having problems. It's also a good idea to know your test results and keep a list of the medicines you take.  How is a pelvic exam done?  During a pelvic exam, you will:  Take off your clothes below the waist. You will get a paper or cloth cover to put over the lower half of your body.  Lie on your back on an exam table with your feet and legs supported by footrests.  The doctor may:  Put on gloves and check the opening of your vagina for sores or swelling.  Gently put a tool called a speculum into your vagina. It opens the vagina a little bit. You may feel some pressure. The speculum lets your doctor see inside the vagina.  Use a small brush, spatula, or swab to get a sample for testing. The doctor then removes the speculum.  Put one or two gloved fingers of one hand into your vagina. The other hand goes on your lower belly. This lets your doctor feel your pelvic organs. You will probably feel some pressure.  Put one gloved finger into your rectum and one into your vagina, if needed. This

## 2024-02-16 ENCOUNTER — OFFICE VISIT (OUTPATIENT)
Age: 44
End: 2024-02-16
Payer: COMMERCIAL

## 2024-02-16 VITALS
BODY MASS INDEX: 24.27 KG/M2 | HEIGHT: 66 IN | SYSTOLIC BLOOD PRESSURE: 145 MMHG | DIASTOLIC BLOOD PRESSURE: 82 MMHG | WEIGHT: 151 LBS

## 2024-02-16 DIAGNOSIS — Z01.419 ENCOUNTER FOR GYNECOLOGICAL EXAMINATION (GENERAL) (ROUTINE) WITHOUT ABNORMAL FINDINGS: Primary | ICD-10-CM

## 2024-02-16 PROCEDURE — 99396 PREV VISIT EST AGE 40-64: CPT | Performed by: OBSTETRICS & GYNECOLOGY

## 2024-02-16 NOTE — PROGRESS NOTES
Kaycee Chang is a 43 y.o. female returns for an annual exam     Chief Complaint   Patient presents with    Annual Exam         Patient's last menstrual period was 02/13/2024 (exact date).  Her periods are light in flow and usually regular with a 26-32 day interval with 3-7 day duration.  She does not have dysmenorrhea.  Problems: no problems  Birth Control: none.  Last Pap: 2/15/2023 NILM/ HPV-  She does have a history of ZACH 2, 3 or cervical cancer.    HX LEEP PROCEDURE   2001 + 5/2004     ZACH II     Last Mammogram: Was in our office today    Her brother passed away last year with stomach cancer.       Examination chaperoned by FANG POE LPN.

## 2024-02-16 NOTE — PROGRESS NOTES
Kaycee Chang is a ,  43 y.o. female White (non-) whose LMP was on 2024 who presents for her annual checkup. She is having no problems. Brother  of stomach cancer    Menstrual status:    Her periods are moderate in flow, regular - first day very heavy    She denies dysmenorrhea.      Contraception:    The current method of family planning is none.    Sexual history:    She  reports being sexually active and has had partner(s) who are male. She reports using the following method of birth control/protection: None.        Pap and Mammogram History:  Last Pap: 2/15/2023 NILM/ HPV-  She does have a history of ZACH 2, 3 or cervical cancer.    HX LEEP PROCEDURE    + 2004     ZACH II      Last Mammogram: Was in our office today         Breast Cancer History    She has no family history of breast cancer.    Family History   Problem Relation Age of Onset    No Known Problems Sister     Colon Polyps Sister     Bleeding Prob Brother         ITP    Other Brother         Zollinger-Linares syndrom    No Known Problems Brother     Cancer Maternal Grandmother         colon cancer    Elevated Lipids Mother     Colon Polyps Mother     Hypertension Father     Elevated Lipids Father     Colon Polyps Father     Cancer Paternal Grandmother         colon cancer    Heart Disease Paternal Grandfather     Hypertension Paternal Grandfather     Hypertension Mother     No Known Problems Son     No Known Problems Daughter        Past Medical History:   Diagnosis Date    Abnormal Pap smear of cervix     Headache     History of cryosurgery 2005    ZACH I    Neurocardiogenic syncope     Psychiatric problem     depression    Routine Papanicolaou smear 02/15/2023    NILM, HPV negative     Past Surgical History:   Procedure Laterality Date    COLONOSCOPY N/A 2018    COLONOSCOPY performed by Suleman Camarillo MD at [facility] ENDOSCOPY    DILATION AND EVACUATION OF UTERUS  10/2009    [facility]    GYN  2013    [facility]    LAPAROSCOPY

## 2024-04-22 ENCOUNTER — TELEPHONE (OUTPATIENT)
Age: 44
End: 2024-04-22

## 2024-04-22 DIAGNOSIS — M54.41 ACUTE RIGHT-SIDED LOW BACK PAIN WITH RIGHT-SIDED SCIATICA: ICD-10-CM

## 2024-04-22 RX ORDER — SERTRALINE HYDROCHLORIDE 25 MG/1
25 TABLET, FILM COATED ORAL DAILY
Qty: 90 TABLET | Refills: 0 | Status: SHIPPED | OUTPATIENT
Start: 2024-04-22

## 2024-04-22 NOTE — TELEPHONE ENCOUNTER
----- Message from Diane Marieeford sent at 4/22/2024  9:12 AM EDT -----  Subject: Refill Request    QUESTIONS  Name of Medication? sertraline (ZOLOFT) 25 MG tablet  Patient-reported dosage and instructions? .25mg once per yesica  How many days do you have left? 0  Preferred Pharmacy? [id number]  Pharmacy phone number (if available)? [phone]  Additional Information for Provider? first available appointment is in   June and needs enough medication to get her through to that appointment   ---------------------------------------------------------------------------  --------------  CALL BACK INFO  What is the best way for the office to contact you? OK to leave message on   voicemail  Preferred Call Back Phone Number? [phone]  ---------------------------------------------------------------------------  --------------  SCRIPT ANSWERS  Relationship to Patient? Self

## 2024-06-05 ENCOUNTER — OFFICE VISIT (OUTPATIENT)
Age: 44
End: 2024-06-05
Payer: COMMERCIAL

## 2024-06-05 VITALS
BODY MASS INDEX: 25.1 KG/M2 | DIASTOLIC BLOOD PRESSURE: 79 MMHG | SYSTOLIC BLOOD PRESSURE: 126 MMHG | OXYGEN SATURATION: 96 % | TEMPERATURE: 98.1 F | WEIGHT: 156.2 LBS | HEART RATE: 66 BPM | HEIGHT: 66 IN | RESPIRATION RATE: 16 BRPM

## 2024-06-05 DIAGNOSIS — R53.83 FATIGUE, UNSPECIFIED TYPE: ICD-10-CM

## 2024-06-05 DIAGNOSIS — F34.1 DYSTHYMIA: Primary | ICD-10-CM

## 2024-06-05 PROBLEM — F33.0 MILD EPISODE OF RECURRENT MAJOR DEPRESSIVE DISORDER (HCC): Status: RESOLVED | Noted: 2023-01-24 | Resolved: 2024-06-05

## 2024-06-05 PROCEDURE — 99213 OFFICE O/P EST LOW 20 MIN: CPT | Performed by: INTERNAL MEDICINE

## 2024-06-05 NOTE — PROGRESS NOTES
Kaycee Chang (:  1980) is a 44 y.o. female,Established patient, here for evaluation of the following chief complaint(s):  Medication Check      Assessment & Plan   1. Dysthymia-sxs of decrease motivation and fatigue  Will inc from 25 to 50 mg  Appt in 3 mo  -     sertraline (ZOLOFT) 50 MG tablet; Take 1 tablet by mouth daily, Disp-30 tablet, R-3Normal  2. Fatigue, unspecified type      No follow-ups on file.       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  Some fears about long-term risks in terms of cognitive function with being on SSRI discussed that there are concerns with long-term risk with benzodiazepines but not with SSRIs  Review of Systems       Objective   Physical Exam  Vitals and nursing note reviewed.   Constitutional:       Appearance: Normal appearance.   Neurological:      Mental Status: She is alert and oriented to person, place, and time.   Psychiatric:         Mood and Affect: Mood normal.         Behavior: Behavior normal.              An electronic signature was used to authenticate this note.    --Анна Roy MD    26

## 2024-09-04 ENCOUNTER — OFFICE VISIT (OUTPATIENT)
Age: 44
End: 2024-09-04
Payer: COMMERCIAL

## 2024-09-04 VITALS
BODY MASS INDEX: 25.07 KG/M2 | OXYGEN SATURATION: 98 % | HEIGHT: 66 IN | SYSTOLIC BLOOD PRESSURE: 122 MMHG | DIASTOLIC BLOOD PRESSURE: 74 MMHG | HEART RATE: 54 BPM | WEIGHT: 156 LBS | TEMPERATURE: 98.1 F | RESPIRATION RATE: 16 BRPM

## 2024-09-04 DIAGNOSIS — K90.49 FOOD INTOLERANCE IN ADULT: ICD-10-CM

## 2024-09-04 DIAGNOSIS — F34.1 DYSTHYMIA: Primary | ICD-10-CM

## 2024-09-04 PROCEDURE — 99213 OFFICE O/P EST LOW 20 MIN: CPT | Performed by: INTERNAL MEDICINE

## 2024-09-04 ASSESSMENT — PATIENT HEALTH QUESTIONNAIRE - PHQ9
SUM OF ALL RESPONSES TO PHQ QUESTIONS 1-9: 0
1. LITTLE INTEREST OR PLEASURE IN DOING THINGS: NOT AT ALL
SUM OF ALL RESPONSES TO PHQ QUESTIONS 1-9: 0
SUM OF ALL RESPONSES TO PHQ QUESTIONS 1-9: 0
6. FEELING BAD ABOUT YOURSELF - OR THAT YOU ARE A FAILURE OR HAVE LET YOURSELF OR YOUR FAMILY DOWN: NOT AT ALL
3. TROUBLE FALLING OR STAYING ASLEEP: NOT AT ALL
10. IF YOU CHECKED OFF ANY PROBLEMS, HOW DIFFICULT HAVE THESE PROBLEMS MADE IT FOR YOU TO DO YOUR WORK, TAKE CARE OF THINGS AT HOME, OR GET ALONG WITH OTHER PEOPLE: NOT DIFFICULT AT ALL
8. MOVING OR SPEAKING SO SLOWLY THAT OTHER PEOPLE COULD HAVE NOTICED. OR THE OPPOSITE, BEING SO FIGETY OR RESTLESS THAT YOU HAVE BEEN MOVING AROUND A LOT MORE THAN USUAL: NOT AT ALL
4. FEELING TIRED OR HAVING LITTLE ENERGY: NOT AT ALL
5. POOR APPETITE OR OVEREATING: NOT AT ALL
7. TROUBLE CONCENTRATING ON THINGS, SUCH AS READING THE NEWSPAPER OR WATCHING TELEVISION: NOT AT ALL
9. THOUGHTS THAT YOU WOULD BE BETTER OFF DEAD, OR OF HURTING YOURSELF: NOT AT ALL
SUM OF ALL RESPONSES TO PHQ9 QUESTIONS 1 & 2: 0
2. FEELING DOWN, DEPRESSED OR HOPELESS: NOT AT ALL
SUM OF ALL RESPONSES TO PHQ QUESTIONS 1-9: 0

## 2024-09-04 NOTE — PROGRESS NOTES
Kaycee Chang (:  1980) is a 44 y.o. female,Established patient, here for evaluation of the following chief complaint(s):  Medication Check (3 month follow up)         Assessment & Plan  Dysthymia   Improved with inc from 25 to 50 mg dose   Cont this dose    Orders:  •  sertraline (ZOLOFT) 50 MG tablet; Take 1 tablet by mouth daily    Food intolerance in adult   In last year with some intermittent gi upset  Discussed elimination diet with dairy first and then gluten  Cpe advised           No follow-ups on file.       Subjective   HPI  3 months ago we bumped sertraline from 25-50.  It has been helpful.  She feels better able to handle stressors of life and is generally getting good sleep.  No side effects noted.  Notes in the last year she has had intermittent GI upset with some cramping and loose stools nonbloody no weight loss wonders about food intolerance knows she has to avoid certain nuts.  Discussed elimination diet.  Discussed general physical  Review of Systems       Objective   Physical Exam  Vitals and nursing note reviewed.   Constitutional:       Appearance: Normal appearance.   Neurological:      Mental Status: She is alert.   Psychiatric:         Behavior: Behavior normal.              An electronic signature was used to authenticate this note.    --Анна Roy MD

## 2025-02-20 ENCOUNTER — OFFICE VISIT (OUTPATIENT)
Age: 45
End: 2025-02-20
Payer: COMMERCIAL

## 2025-02-20 VITALS
SYSTOLIC BLOOD PRESSURE: 117 MMHG | RESPIRATION RATE: 20 BRPM | DIASTOLIC BLOOD PRESSURE: 78 MMHG | WEIGHT: 157.4 LBS | HEART RATE: 65 BPM | OXYGEN SATURATION: 97 % | BODY MASS INDEX: 25.3 KG/M2 | HEIGHT: 66 IN

## 2025-02-20 DIAGNOSIS — Z91.89 INCREASED RISK OF BREAST CANCER: ICD-10-CM

## 2025-02-20 DIAGNOSIS — Z01.419 ENCOUNTER FOR GYNECOLOGICAL EXAMINATION (GENERAL) (ROUTINE) WITHOUT ABNORMAL FINDINGS: Primary | ICD-10-CM

## 2025-02-20 PROCEDURE — 99459 PELVIC EXAMINATION: CPT | Performed by: OBSTETRICS & GYNECOLOGY

## 2025-02-20 PROCEDURE — 99396 PREV VISIT EST AGE 40-64: CPT | Performed by: OBSTETRICS & GYNECOLOGY

## 2025-02-20 NOTE — PROGRESS NOTES
Kaycee Chang is a 44 y.o. female returns for an annual exam     Chief Complaint   Patient presents with    Annual Exam     Patient's last menstrual period was 02/04/2025 (approximate).  Her periods are moderate in flow and usually regular with a 26-32 day interval with 3-7 day duration.  She does not have dysmenorrhea.  Problems: mother diagnosed with breast cancer, had surgery to remove didn't need chemo or radiation on tamoxafin, perimenoupause ?  Birth Control: none.  Last Pap: Normal, HPV Negative obtained 2/15/23  She does have a history of ZACH 2, 3 or cervical cancer.   Last Mammogram: had her mammogram today in our office.   Last colonoscopy: 2024,found polyps      1. Have you been to the ER, urgent care clinic, or hospitalized since your last visit? No    2. Have you seen or consulted any other health care providers outside of the [facility] since your last visit? No    Examination chaperoned by Adela Mello LPN.

## 2025-02-20 NOTE — PROGRESS NOTES
Kaycee Chang is a ,  44 y.o. female White (non-) whose LMP was on 2025 who presents for her annual checkup. She is having no problems.    Menstrual status:    Her periods are moderate in flow, regular    She denies dysmenorrhea.      Contraception:    The current method of family planning is none.    Sexual history:    She  reports being sexually active and has had partner(s) who are male. She reports using the following method of birth control/protection: None.        Pap and Mammogram History:    Last Pap: Normal, HPV Negative obtained 2/15/23  She does have a history of ZACH 2, 3 or cervical cancer.   Last Mammogram: had her mammogram today in our office.   Last colonoscopy: ,found polyps      Breast Cancer History    She has a family history of breast cancer.    Family History   Problem Relation Age of Onset    Elevated Lipids Mother     Colon Polyps Mother     Hypertension Mother     Breast Cancer Mother 68        pt. stated mom had it removed and had to take a \"pill\" no chemo or radiation    Hypertension Father     Elevated Lipids Father     Colon Polyps Father     No Known Problems Sister     Colon Polyps Sister     Bleeding Prob Brother         ITP    Other Brother         Zollinger-Linares syndrom    No Known Problems Brother     Cancer Maternal Grandmother         colon cancer    Cancer Paternal Grandmother         colon cancer    Heart Disease Paternal Grandfather     Hypertension Paternal Grandfather     No Known Problems Daughter     No Known Problems Son        Past Medical History:   Diagnosis Date    Abnormal Pap smear of cervix     Headache     History of cryosurgery 2005    ZACH I    Neurocardiogenic syncope     Psychiatric problem     depression    Routine Papanicolaou smear 02/15/2023    NILM, HPV negative     Past Surgical History:   Procedure Laterality Date    COLONOSCOPY N/A 2018    COLONOSCOPY performed by Suleman Camarillo MD at [facility] ENDOSCOPY    DILATION AND

## 2025-02-26 ENCOUNTER — OFFICE VISIT (OUTPATIENT)
Facility: CLINIC | Age: 45
End: 2025-02-26

## 2025-02-26 VITALS
TEMPERATURE: 97.6 F | OXYGEN SATURATION: 96 % | HEIGHT: 66 IN | DIASTOLIC BLOOD PRESSURE: 80 MMHG | BODY MASS INDEX: 25.07 KG/M2 | WEIGHT: 156 LBS | RESPIRATION RATE: 16 BRPM | HEART RATE: 60 BPM | SYSTOLIC BLOOD PRESSURE: 122 MMHG

## 2025-02-26 DIAGNOSIS — Z80.3 FH: BREAST CANCER: ICD-10-CM

## 2025-02-26 DIAGNOSIS — Z00.00 WELL WOMAN EXAM (NO GYNECOLOGICAL EXAM): Primary | ICD-10-CM

## 2025-02-26 DIAGNOSIS — F34.1 DYSTHYMIA: ICD-10-CM

## 2025-02-26 DIAGNOSIS — Z00.00 WELL WOMAN EXAM (NO GYNECOLOGICAL EXAM): ICD-10-CM

## 2025-02-26 DIAGNOSIS — Z83.49 FH: THYROID DISEASE: ICD-10-CM

## 2025-02-26 SDOH — ECONOMIC STABILITY: FOOD INSECURITY: WITHIN THE PAST 12 MONTHS, THE FOOD YOU BOUGHT JUST DIDN'T LAST AND YOU DIDN'T HAVE MONEY TO GET MORE.: NEVER TRUE

## 2025-02-26 SDOH — ECONOMIC STABILITY: FOOD INSECURITY: WITHIN THE PAST 12 MONTHS, YOU WORRIED THAT YOUR FOOD WOULD RUN OUT BEFORE YOU GOT MONEY TO BUY MORE.: NEVER TRUE

## 2025-02-26 NOTE — PROGRESS NOTES
2025    Kaycee Chang (:  1980) is a 44 y.o. female, here for a preventive medicine evaluation.    GYN care with Dr. Shilpa Robles.  Indicates she will start supplementing mammograms with breast MRIs because of family history of breast cancer in both mother and sister.    Notes since our last visit both her father and 49-year-old sister have been diagnosed hypothyroid.  She is interested in thyroid levels.  Missed her lab work last year.  No overt symptoms of hypo or hyperthyroidism.    Also notes brother who passed away with bleeding complications had Zollinger-Linares and now a sister age 42 has the same diagnosis.    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    Review of symptoms is negative.  Feels the current dose of his Zoloft 50 mg is working well    Subjective   Patient Active Problem List   Diagnosis    Neurocardiogenic syncope    Fatigue, unspecified type       Review of Systems    Prior to Visit Medications    Medication Sig Taking? Authorizing Provider   sertraline (ZOLOFT) 50 MG tablet Take 1 tablet by mouth daily Yes Анна Roy MD   tretinoin (RETIN-A) 0.025 % cream  Yes Provider, MD Abelardo        No Known Allergies    Past Medical History:   Diagnosis Date    Abnormal Pap smear of cervix     FH: breast cancer     mother and sister    Headache     History of cryosurgery 2005    ZACH I    Neurocardiogenic syncope     Psychiatric problem     depression    Routine Papanicolaou smear 02/15/2023    NILM, HPV negative       Past Surgical History:   Procedure Laterality Date    COLONOSCOPY N/A 2018    COLONOSCOPY performed by Suleman Camarillo MD at [facility] ENDOSCOPY    DILATION AND EVACUATION OF UTERUS  10/2009    SAB    GYN  2013    [facility]    LAPAROSCOPY  10/2009    r/o ectopic    LEEP   + 2004    ZACH II    OTHER SURGICAL

## 2025-03-03 ENCOUNTER — TELEPHONE (OUTPATIENT)
Age: 45
End: 2025-03-03

## 2025-03-03 NOTE — TELEPHONE ENCOUNTER
Lvm for patient to let them know we got a referral from one of their doctors to come and see Genetics and to call back to get there apt set up.      FH: breast cancer/Dr. MACHADO

## 2025-03-04 NOTE — TELEPHONE ENCOUNTER
Called and spoke with patient and got them added for her Genetics apt went over the day and time. Told them I would be sending her a [employer] message of where to come and papers to fill out. Patient did not have anymore questions at this time

## 2025-03-20 ENCOUNTER — RESULTS FOLLOW-UP (OUTPATIENT)
Facility: CLINIC | Age: 45
End: 2025-03-20

## 2025-03-20 LAB
ALBUMIN SERPL-MCNC: 4.5 G/DL (ref 3.9–4.9)
ALP SERPL-CCNC: 64 IU/L (ref 44–121)
ALT SERPL-CCNC: 11 IU/L (ref 0–32)
AST SERPL-CCNC: 14 IU/L (ref 0–40)
BILIRUB SERPL-MCNC: 0.4 MG/DL (ref 0–1.2)
BUN SERPL-MCNC: 15 MG/DL (ref 6–24)
BUN/CREAT SERPL: 18 (ref 9–23)
CALCIUM SERPL-MCNC: 9.5 MG/DL (ref 8.7–10.2)
CHLORIDE SERPL-SCNC: 104 MMOL/L (ref 96–106)
CHOLEST SERPL-MCNC: 159 MG/DL (ref 100–199)
CO2 SERPL-SCNC: 20 MMOL/L (ref 20–29)
CREAT SERPL-MCNC: 0.85 MG/DL (ref 0.57–1)
EGFRCR SERPLBLD CKD-EPI 2021: 88 ML/MIN/1.73
ERYTHROCYTE [DISTWIDTH] IN BLOOD BY AUTOMATED COUNT: 12.6 % (ref 11.7–15.4)
GLOBULIN SER CALC-MCNC: 2.2 G/DL (ref 1.5–4.5)
GLUCOSE SERPL-MCNC: 83 MG/DL (ref 70–99)
HBA1C MFR BLD: 5.4 % (ref 4.8–5.6)
HCT VFR BLD AUTO: 39.4 % (ref 34–46.6)
HDLC SERPL-MCNC: 66 MG/DL
HGB BLD-MCNC: 13.1 G/DL (ref 11.1–15.9)
IMP & REVIEW OF LAB RESULTS: NORMAL
LDLC SERPL CALC-MCNC: 79 MG/DL (ref 0–99)
MCH RBC QN AUTO: 29.8 PG (ref 26.6–33)
MCHC RBC AUTO-ENTMCNC: 33.2 G/DL (ref 31.5–35.7)
MCV RBC AUTO: 90 FL (ref 79–97)
PLATELET # BLD AUTO: 319 X10E3/UL (ref 150–450)
POTASSIUM SERPL-SCNC: 4.9 MMOL/L (ref 3.5–5.2)
PROT SERPL-MCNC: 6.7 G/DL (ref 6–8.5)
RBC # BLD AUTO: 4.4 X10E6/UL (ref 3.77–5.28)
SODIUM SERPL-SCNC: 141 MMOL/L (ref 134–144)
T4 FREE SERPL-MCNC: 1.1 NG/DL (ref 0.82–1.77)
TRIGL SERPL-MCNC: 73 MG/DL (ref 0–149)
TSH SERPL DL<=0.005 MIU/L-ACNC: 2.85 UIU/ML (ref 0.45–4.5)
VLDLC SERPL CALC-MCNC: 14 MG/DL (ref 5–40)
WBC # BLD AUTO: 6.6 X10E3/UL (ref 3.4–10.8)

## 2025-03-23 NOTE — PROGRESS NOTES
[facility] [place] [facility]  Oncology Genetics and High-Risk Program  [facility]  [address], [address]  [place]  [address]    Name: Kaycee Chang  Age: 44 y.o.  : 1980  Referring MD: Dr. Анна Roy MD    Summary  Risk of hereditary cancer syndrome identified. Patient opted to proceed with genetic testing. We will follow up in about 3 weeks to re-evaluate cancer risks in-light-of genetic test results and personal and family history.    Re-referred to [facility] for high-risk for breast cancer, I will reach out and ask them to call her to schedule.    REASON FOR REFERRAL  Kaycee Chang is a 44 y.o. female who presents today for concern for a hereditary cancer syndrome and high-risk consult. The patient is being referred by her PCP, Dr. Анна Roy MD. She is also followed by her OB/GYN, Dr. Shilpa Robles MD.    The goal of today's visit is to assess cancer risks, discuss benefits and limitations of any indicated genetic and genomic testing, review how this information might impact the patient and close family members, and identify strategies for cancer risk-reduction and high-risk management (when applicable).    HPI  Patient recently met with her PCP, Dr. Roy for preventative medicine visit. Family history reviewed in detail during her visit with Dr. Roy and raised concerns for possibility of a hereditary cancer syndrome (significant history of breast and colorectal cancer, stomach cancer, and colon polyps). Patient was encouraged to consider genetic counseling and possible testing.      History of Colon Polyp: Additionally, the patient reports a personal history of recent colon polyp. She explains she has been undergoing colonoscopy since she was around 37 years old. A review of records shows that she had a colonoscopy for GI symptoms in 2018 and no polyps were noted at that time. A 5-year screening plan was recommended and she underwent repeat

## 2025-04-01 ENCOUNTER — INITIAL CONSULT (OUTPATIENT)
Age: 45
End: 2025-04-01
Payer: COMMERCIAL

## 2025-04-01 DIAGNOSIS — Z91.89 AT HIGH RISK FOR BREAST CANCER: ICD-10-CM

## 2025-04-01 DIAGNOSIS — Z86.0100 HISTORY OF COLON POLYPS: ICD-10-CM

## 2025-04-01 DIAGNOSIS — Z83.719 FAMILY HISTORY OF COLONIC POLYPS: ICD-10-CM

## 2025-04-01 DIAGNOSIS — Z80.3 FAMILY HISTORY OF MALIGNANT NEOPLASM OF BREAST: Primary | ICD-10-CM

## 2025-04-01 DIAGNOSIS — Z13.71 ENCNTR FOR NONPROCREAT SCREEN FOR GENETIC DIS CARRIER STATUS: ICD-10-CM

## 2025-04-01 DIAGNOSIS — Z91.89 HIGH RISK FOR COLON CANCER: ICD-10-CM

## 2025-04-01 DIAGNOSIS — Z80.0 FAMILY HISTORY OF COLON CANCER: ICD-10-CM

## 2025-04-01 PROCEDURE — 99202 OFFICE O/P NEW SF 15 MIN: CPT | Performed by: NURSE PRACTITIONER

## 2025-04-16 ENCOUNTER — TELEPHONE (OUTPATIENT)
Age: 45
End: 2025-04-16

## 2025-04-16 NOTE — TELEPHONE ENCOUNTER
Patient was referred by Caterina for high risk. Called patient left message for her to call me back.

## 2025-04-24 ENCOUNTER — SCHEDULED TELEPHONE ENCOUNTER (OUTPATIENT)
Age: 45
End: 2025-04-24

## 2025-04-24 DIAGNOSIS — Z80.3 FAMILY HISTORY OF MALIGNANT NEOPLASM OF BREAST: ICD-10-CM

## 2025-04-24 DIAGNOSIS — Z86.0100 HISTORY OF COLON POLYPS: Primary | ICD-10-CM

## 2025-04-24 DIAGNOSIS — Z15.89 MONOALLELIC MUTATION OF MUTYH GENE: ICD-10-CM

## 2025-04-24 DIAGNOSIS — Z80.0 FAMILY HISTORY OF COLON CANCER: ICD-10-CM

## 2025-04-24 NOTE — PROGRESS NOTES
[facility] [place] [facility]  Oncology Genetics Program  [facility]  [address], [address]  [place]  [address]      Name: Kaycee Chang  Age: 44 y.o.  : 1980    Reason for Referral  Kaycee Chang is a 44 y.o. female who was referred by her PCP, Dr. Roy, for concern for a hereditary cancer syndrome.     Summary of Results  Tony Tyler Next, Genes Analyzed (43 total): APC, ANKIT, BAP1, BARD1, BMPR1A, BRCA1, BRCA2, BRIP1, CDH1, CDKN2A, CHEK2, FH, FLCN, MET, MLH1, MSH2, MSH6, MUTYH, NF1, NTHL1, PALB2, PMS2, PTEN, RAD51C, RAD51D, SMAD4, STK11, TP53, TSC1, TSC2 and VHL (sequencing and deletion/duplication); AXIN2, CTNNA1, HOXB13, MBD4, MLH3, MSH3, POLD1, POLE, RNF43 and RPS20 (sequencing only); EPCAM and GREM1 (deletion/duplication only). RNA data is routinely analyzed for use in variant interpretation for all genes.    Results:  Positive.   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     Risk estimate: there is currently no evidence to suggest a significantly increased cancer risk for monoallelic MUTYH pathogenic variant carriers.    No additional pathogenic mutations, variants of unknown significance, or gross deletions or duplications were detected.     HPI  History of Colon Polyps:   Undergoing colonoscopy since she was around 37 years old. A review of records shows that she had a colonoscopy for GI symptoms in 2018 and no polyps were noted at that time. A 5-year screening plan was recommended and she underwent repeat colonoscopy in 2023, a single sessile 10 mm polyp was detected in the ascending colon and removed. She is on three-year follow-up plan.     Breast Cancer Risk:   Family history of breast cancer. Recently recommended to consider high-risk breast screening with MRI due to her family history (breast cancer in mother). She was referred

## 2025-06-09 ENCOUNTER — TELEPHONE (OUTPATIENT)
Age: 45
End: 2025-06-09

## 2025-06-09 NOTE — TELEPHONE ENCOUNTER
PT name and  verified    46 yo last ov/ae 25, next ae 26    PT calling stating she had her annual and mammo in February and was referred by  for suggested MRI based on her history/score, and was needing an order for the MRI.  RN reviewed annual plan/note 25:  Plan:  Counseled re: diet, exercise, healthy lifestyle  Return for yearly wellness visits  Rec annual mammogram  Mammo normal - increased risk for breast cancer - refer to [facility]      RN advised PT to call the [facility] and set an appt up with them, and that MD would decide and order what further testing may be needed, that  does not order that testing, just refers to the [facility].   PT asks if her insurance would cover, RN relayed that did not know if and what insurance covers, but it was a continuing of care for the PT, and she has a family hx of breast cancer, and if she wanted confirmation, she could ask the [facility] and or call her insurance first.  PT verbalizes understanding and will call the [facility].

## 2025-07-10 ENCOUNTER — OFFICE VISIT (OUTPATIENT)
Age: 45
End: 2025-07-10
Payer: COMMERCIAL

## 2025-07-10 VITALS
HEART RATE: 53 BPM | WEIGHT: 156 LBS | DIASTOLIC BLOOD PRESSURE: 63 MMHG | BODY MASS INDEX: 25.07 KG/M2 | SYSTOLIC BLOOD PRESSURE: 113 MMHG | HEIGHT: 66 IN | OXYGEN SATURATION: 98 %

## 2025-07-10 DIAGNOSIS — Z12.31 ENCOUNTER FOR SCREENING MAMMOGRAM FOR BREAST CANCER: ICD-10-CM

## 2025-07-10 DIAGNOSIS — Z80.3 FAMILY HISTORY OF BREAST CANCER: ICD-10-CM

## 2025-07-10 DIAGNOSIS — Z91.89 AT HIGH RISK FOR BREAST CANCER: Primary | ICD-10-CM

## 2025-07-10 PROCEDURE — 99203 OFFICE O/P NEW LOW 30 MIN: CPT | Performed by: NURSE PRACTITIONER

## 2025-07-10 NOTE — PROGRESS NOTES
HISTORY OF PRESENT ILLNESS  Kaycee Chang is a 45 y.o. female     HPI New patient presents for evaluation as a high risk patient due to her family history of breast cancer. Denies breast mass, skin changes, nipple discharge and pain.        Breast history -   Referring - Caterina Pierce NP  No history of breast biopsies      Family history -   Mother - breast cancer dx at 68   Brother - stomach cancer at 45; Zollinger-Linares syndrome   Maternal grandmother - brain and colon cancer  Paternal grandmother - lung and colon cancer   - patient had genetic testing with Caterina Pierce NP - genetics  Positive.   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    Risk estimate: there is currently no evidence to suggest a significantly increased cancer risk for monoallelic MUTYH pathogenic variant carriers.      OB History          3    Para   2    Term   2       0    AB   1    Living   2         SAB   1    IAB        Ectopic        Molar        Multiple        Live Births   2          Obstetric Comments   Menarche 13, LMP 6/15/25, # of children 2, age of 1st delivery 30, Hysterectomy/oophorectomy NO/NO, Breast bx NO, history of breast feeding YES, BCP YES, Hormone therapy NO                     Past Surgical History:   Procedure Laterality Date    COLONOSCOPY N/A 2018    COLONOSCOPY performed by Suleman Cmaarillo MD at [facility] ENDOSCOPY    DILATION AND EVACUATION OF UTERUS  10/2009    SAB    GYN  2013    [facility]    LAPAROSCOPY  10/2009    r/o ectopic    LEEP   + 2004    ZACH II    OTHER SURGICAL HISTORY      WISDOM TOOTH EXTRACTION           Mammogram Result (most recent):  MAURICIO DIGITAL SCREEN W OR WO CAD BILATERAL 2025    Narrative  STUDY: Bilateral digital screening mammogram    INDICATION:  Screening.    COMPARISON:     BREAST COMPOSITION: The breasts are heterogeneously dense, which may

## 2025-08-05 ENCOUNTER — OFFICE VISIT (OUTPATIENT)
Facility: CLINIC | Age: 45
End: 2025-08-05
Payer: COMMERCIAL

## 2025-08-05 VITALS
HEIGHT: 66 IN | WEIGHT: 156 LBS | HEART RATE: 65 BPM | TEMPERATURE: 98.3 F | DIASTOLIC BLOOD PRESSURE: 71 MMHG | RESPIRATION RATE: 16 BRPM | BODY MASS INDEX: 25.07 KG/M2 | OXYGEN SATURATION: 97 % | SYSTOLIC BLOOD PRESSURE: 105 MMHG

## 2025-08-05 DIAGNOSIS — R09.81 HEAD CONGESTION: ICD-10-CM

## 2025-08-05 DIAGNOSIS — J01.10 ACUTE NON-RECURRENT FRONTAL SINUSITIS: Primary | ICD-10-CM

## 2025-08-05 PROCEDURE — 99213 OFFICE O/P EST LOW 20 MIN: CPT | Performed by: INTERNAL MEDICINE

## 2025-08-05 RX ORDER — FLUCONAZOLE 150 MG/1
150 TABLET ORAL
Qty: 2 TABLET | Refills: 0 | Status: SHIPPED | OUTPATIENT
Start: 2025-08-05 | End: 2025-08-11

## 2025-08-05 ASSESSMENT — PATIENT HEALTH QUESTIONNAIRE - PHQ9
SUM OF ALL RESPONSES TO PHQ QUESTIONS 1-9: 0
5. POOR APPETITE OR OVEREATING: NOT AT ALL
4. FEELING TIRED OR HAVING LITTLE ENERGY: NOT AT ALL
SUM OF ALL RESPONSES TO PHQ QUESTIONS 1-9: 0
7. TROUBLE CONCENTRATING ON THINGS, SUCH AS READING THE NEWSPAPER OR WATCHING TELEVISION: NOT AT ALL
6. FEELING BAD ABOUT YOURSELF - OR THAT YOU ARE A FAILURE OR HAVE LET YOURSELF OR YOUR FAMILY DOWN: NOT AT ALL
3. TROUBLE FALLING OR STAYING ASLEEP: NOT AT ALL
2. FEELING DOWN, DEPRESSED OR HOPELESS: NOT AT ALL
SUM OF ALL RESPONSES TO PHQ QUESTIONS 1-9: 0
1. LITTLE INTEREST OR PLEASURE IN DOING THINGS: NOT AT ALL
SUM OF ALL RESPONSES TO PHQ QUESTIONS 1-9: 0
10. IF YOU CHECKED OFF ANY PROBLEMS, HOW DIFFICULT HAVE THESE PROBLEMS MADE IT FOR YOU TO DO YOUR WORK, TAKE CARE OF THINGS AT HOME, OR GET ALONG WITH OTHER PEOPLE: NOT DIFFICULT AT ALL
9. THOUGHTS THAT YOU WOULD BE BETTER OFF DEAD, OR OF HURTING YOURSELF: NOT AT ALL
8. MOVING OR SPEAKING SO SLOWLY THAT OTHER PEOPLE COULD HAVE NOTICED. OR THE OPPOSITE, BEING SO FIGETY OR RESTLESS THAT YOU HAVE BEEN MOVING AROUND A LOT MORE THAN USUAL: NOT AT ALL

## 2025-08-05 ASSESSMENT — ENCOUNTER SYMPTOMS: SINUS COMPLAINT: 1

## (undated) DEVICE — 1200 GUARD II KIT W/5MM TUBE W/O VAC TUBE: Brand: GUARDIAN

## (undated) DEVICE — 3M™ CUROS™ DISINFECTING CAP FOR NEEDLELESS CONNECTORS 270/CARTON 20 CARTONS/CASE CFF1-270: Brand: CUROS™

## (undated) DEVICE — KIT COLON W/ 1.1OZ LUB AND 2 END

## (undated) DEVICE — KENDALL RADIOLUCENT FOAM MONITORING ELECTRODE -RECTANGULAR SHAPE: Brand: KENDALL

## (undated) DEVICE — Device

## (undated) DEVICE — SOLIDIFIER MEDC 1200ML -- CONVERT TO 356117

## (undated) DEVICE — CANN NASAL O2 CAPNOGRAPHY AD -- FILTERLINE

## (undated) DEVICE — BAG BELONG PT PERS CLEAR HANDL

## (undated) DEVICE — CATH IV AUTOGRD BC PNK 20GA 25 -- INSYTE

## (undated) DEVICE — BASIN EMSIS 16OZ GRAPHITE PLAS KID SHP MOLD GRAD FOR ORAL